# Patient Record
Sex: FEMALE | Race: BLACK OR AFRICAN AMERICAN | NOT HISPANIC OR LATINO | Employment: UNEMPLOYED | URBAN - METROPOLITAN AREA
[De-identification: names, ages, dates, MRNs, and addresses within clinical notes are randomized per-mention and may not be internally consistent; named-entity substitution may affect disease eponyms.]

---

## 2017-01-26 ENCOUNTER — HISTORICAL (OUTPATIENT)
Dept: INTERNAL MEDICINE | Facility: CLINIC | Age: 62
End: 2017-01-26

## 2017-08-10 ENCOUNTER — HISTORICAL (OUTPATIENT)
Dept: INTERNAL MEDICINE | Facility: CLINIC | Age: 62
End: 2017-08-10

## 2017-08-10 LAB
ABS NEUT (OLG): 4.74 X10(3)/MCL (ref 2.1–9.2)
ALBUMIN SERPL-MCNC: 3.6 GM/DL (ref 3.4–5)
ALBUMIN/GLOB SERPL: 1 RATIO (ref 1–2)
ALP SERPL-CCNC: 150 UNIT/L (ref 45–117)
ALT SERPL-CCNC: 15 UNIT/L (ref 12–78)
AST SERPL-CCNC: 13 UNIT/L (ref 15–37)
BASOPHILS # BLD AUTO: 0.04 X10(3)/MCL
BASOPHILS NFR BLD AUTO: 0 % (ref 0–1)
BILIRUB SERPL-MCNC: 0.5 MG/DL (ref 0.2–1)
BILIRUBIN DIRECT+TOT PNL SERPL-MCNC: 0.1 MG/DL
BILIRUBIN DIRECT+TOT PNL SERPL-MCNC: 0.4 MG/DL
BUN SERPL-MCNC: 13 MG/DL (ref 7–18)
CALCIUM SERPL-MCNC: 9.5 MG/DL (ref 8.5–10.1)
CHLORIDE SERPL-SCNC: 110 MMOL/L (ref 98–107)
CHOLEST SERPL-MCNC: 166 MG/DL
CHOLEST/HDLC SERPL: 2.2 {RATIO} (ref 0–4.4)
CO2 SERPL-SCNC: 30 MMOL/L (ref 21–32)
CREAT SERPL-MCNC: 1 MG/DL (ref 0.6–1.3)
EOSINOPHIL # BLD AUTO: 0.25 10*3/UL
EOSINOPHIL NFR BLD AUTO: 3 % (ref 0–5)
ERYTHROCYTE [DISTWIDTH] IN BLOOD BY AUTOMATED COUNT: 13.1 % (ref 11.5–14.5)
EST. AVERAGE GLUCOSE BLD GHB EST-MCNC: 126 MG/DL
GLOBULIN SER-MCNC: 3.9 GM/ML (ref 2.3–3.5)
GLUCOSE SERPL-MCNC: 94 MG/DL (ref 74–106)
HBA1C MFR BLD: 6 % (ref 4.2–6.3)
HCT VFR BLD AUTO: 36.5 % (ref 35–46)
HDLC SERPL-MCNC: 77 MG/DL
HGB BLD-MCNC: 11.4 GM/DL (ref 12–16)
IMM GRANULOCYTES # BLD AUTO: 0.01 10*3/UL
IMM GRANULOCYTES NFR BLD AUTO: 0 %
LDLC SERPL CALC-MCNC: 78 MG/DL (ref 0–130)
LYMPHOCYTES # BLD AUTO: 2.15 X10(3)/MCL
LYMPHOCYTES NFR BLD AUTO: 28 % (ref 15–40)
MCH RBC QN AUTO: 28.6 PG (ref 26–34)
MCHC RBC AUTO-ENTMCNC: 31.2 GM/DL (ref 31–37)
MCV RBC AUTO: 91.7 FL (ref 80–100)
MONOCYTES # BLD AUTO: 0.42 X10(3)/MCL
MONOCYTES NFR BLD AUTO: 6 % (ref 4–12)
NEUTROPHILS # BLD AUTO: 4.74 X10(3)/MCL
NEUTROPHILS NFR BLD AUTO: 62 X10(3)/MCL
PLATELET # BLD AUTO: 257 X10(3)/MCL (ref 130–400)
PMV BLD AUTO: 10.4 FL (ref 7.4–10.4)
POTASSIUM SERPL-SCNC: 3.7 MMOL/L (ref 3.5–5.1)
PROT SERPL-MCNC: 7.5 GM/DL (ref 6.4–8.2)
RBC # BLD AUTO: 3.98 X10(6)/MCL (ref 4–5.2)
SODIUM SERPL-SCNC: 143 MMOL/L (ref 136–145)
TRIGL SERPL-MCNC: 56 MG/DL
TSH SERPL-ACNC: 2.36 MIU/L (ref 0.36–3.74)
VLDLC SERPL CALC-MCNC: 11 MG/DL
WBC # SPEC AUTO: 7.6 X10(3)/MCL (ref 4.5–11)

## 2017-09-15 ENCOUNTER — HISTORICAL (OUTPATIENT)
Dept: RADIOLOGY | Facility: HOSPITAL | Age: 62
End: 2017-09-15

## 2018-02-05 ENCOUNTER — HISTORICAL (OUTPATIENT)
Dept: INTERNAL MEDICINE | Facility: CLINIC | Age: 63
End: 2018-02-05

## 2018-02-05 LAB
ABS NEUT (OLG): 3.96 X10(3)/MCL (ref 2.1–9.2)
ALBUMIN SERPL-MCNC: 3.4 GM/DL (ref 3.4–5)
ALBUMIN/GLOB SERPL: 1 RATIO (ref 1–2)
ALP SERPL-CCNC: 155 UNIT/L (ref 45–117)
ALT SERPL-CCNC: 12 UNIT/L (ref 12–78)
AST SERPL-CCNC: 14 UNIT/L (ref 15–37)
BASOPHILS # BLD AUTO: 0.04 X10(3)/MCL
BASOPHILS NFR BLD AUTO: 1 % (ref 0–1)
BILIRUB SERPL-MCNC: 0.5 MG/DL (ref 0.2–1)
BILIRUBIN DIRECT+TOT PNL SERPL-MCNC: 0.2 MG/DL
BILIRUBIN DIRECT+TOT PNL SERPL-MCNC: 0.3 MG/DL
BUN SERPL-MCNC: 14 MG/DL (ref 7–18)
CALCIUM SERPL-MCNC: 9.4 MG/DL (ref 8.5–10.1)
CHLORIDE SERPL-SCNC: 109 MMOL/L (ref 98–107)
CHOLEST SERPL-MCNC: 156 MG/DL
CHOLEST/HDLC SERPL: 2.4 {RATIO} (ref 0–4.4)
CO2 SERPL-SCNC: 28 MMOL/L (ref 21–32)
CREAT SERPL-MCNC: 1 MG/DL (ref 0.6–1.3)
EOSINOPHIL # BLD AUTO: 0.23 10*3/UL
EOSINOPHIL NFR BLD AUTO: 4 % (ref 0–5)
ERYTHROCYTE [DISTWIDTH] IN BLOOD BY AUTOMATED COUNT: 12.9 % (ref 11.5–14.5)
EST. AVERAGE GLUCOSE BLD GHB EST-MCNC: 126 MG/DL
GLOBULIN SER-MCNC: 4.3 GM/ML (ref 2.3–3.5)
GLUCOSE SERPL-MCNC: 84 MG/DL (ref 74–106)
HBA1C MFR BLD: 6 % (ref 4.2–6.3)
HCT VFR BLD AUTO: 38 % (ref 35–46)
HDLC SERPL-MCNC: 66 MG/DL
HGB BLD-MCNC: 12.1 GM/DL (ref 12–16)
IMM GRANULOCYTES # BLD AUTO: 0.01 10*3/UL
IMM GRANULOCYTES NFR BLD AUTO: 0 %
LDLC SERPL CALC-MCNC: 77 MG/DL (ref 0–130)
LYMPHOCYTES # BLD AUTO: 2.06 X10(3)/MCL
LYMPHOCYTES NFR BLD AUTO: 31 % (ref 15–40)
MCH RBC QN AUTO: 29.4 PG (ref 26–34)
MCHC RBC AUTO-ENTMCNC: 31.8 GM/DL (ref 31–37)
MCV RBC AUTO: 92.2 FL (ref 80–100)
MONOCYTES # BLD AUTO: 0.36 X10(3)/MCL
MONOCYTES NFR BLD AUTO: 5 % (ref 4–12)
NEUTROPHILS # BLD AUTO: 3.96 X10(3)/MCL
NEUTROPHILS NFR BLD AUTO: 59 X10(3)/MCL
PLATELET # BLD AUTO: 275 X10(3)/MCL (ref 130–400)
PMV BLD AUTO: 10.7 FL (ref 7.4–10.4)
POTASSIUM SERPL-SCNC: 3.8 MMOL/L (ref 3.5–5.1)
PROT SERPL-MCNC: 7.7 GM/DL (ref 6.4–8.2)
RBC # BLD AUTO: 4.12 X10(6)/MCL (ref 4–5.2)
SODIUM SERPL-SCNC: 142 MMOL/L (ref 136–145)
TRIGL SERPL-MCNC: 64 MG/DL
TSH SERPL-ACNC: 1.45 MIU/L (ref 0.36–3.74)
VLDLC SERPL CALC-MCNC: 13 MG/DL
WBC # SPEC AUTO: 6.7 X10(3)/MCL (ref 4.5–11)

## 2018-02-19 ENCOUNTER — HISTORICAL (OUTPATIENT)
Dept: ADMINISTRATIVE | Facility: HOSPITAL | Age: 63
End: 2018-02-19

## 2018-08-14 ENCOUNTER — HISTORICAL (OUTPATIENT)
Dept: INTERNAL MEDICINE | Facility: CLINIC | Age: 63
End: 2018-08-14

## 2018-08-14 LAB
ABS NEUT (OLG): 4.52 X10(3)/MCL (ref 2.1–9.2)
ALBUMIN SERPL-MCNC: 3.7 GM/DL (ref 3.4–5)
ALBUMIN/GLOB SERPL: 1 RATIO (ref 1–2)
ALP SERPL-CCNC: 160 UNIT/L (ref 45–117)
ALT SERPL-CCNC: 14 UNIT/L (ref 12–78)
AST SERPL-CCNC: 13 UNIT/L (ref 15–37)
BASOPHILS # BLD AUTO: 0.03 X10(3)/MCL
BASOPHILS NFR BLD AUTO: 0 %
BILIRUB SERPL-MCNC: 0.5 MG/DL (ref 0.2–1)
BILIRUBIN DIRECT+TOT PNL SERPL-MCNC: 0.1 MG/DL
BILIRUBIN DIRECT+TOT PNL SERPL-MCNC: 0.4 MG/DL
BUN SERPL-MCNC: 16 MG/DL (ref 7–18)
CALCIUM SERPL-MCNC: 9.6 MG/DL (ref 8.5–10.1)
CHLORIDE SERPL-SCNC: 108 MMOL/L (ref 98–107)
CHOLEST SERPL-MCNC: 166 MG/DL
CHOLEST/HDLC SERPL: 2.6 {RATIO} (ref 0–4.4)
CO2 SERPL-SCNC: 27 MMOL/L (ref 21–32)
CREAT SERPL-MCNC: 1.1 MG/DL (ref 0.6–1.3)
EOSINOPHIL # BLD AUTO: 0.25 10*3/UL
EOSINOPHIL NFR BLD AUTO: 3 %
ERYTHROCYTE [DISTWIDTH] IN BLOOD BY AUTOMATED COUNT: 12.9 % (ref 11.5–14.5)
EST. AVERAGE GLUCOSE BLD GHB EST-MCNC: 114 MG/DL
GLOBULIN SER-MCNC: 4.4 GM/ML (ref 2.3–3.5)
GLUCOSE SERPL-MCNC: 92 MG/DL (ref 74–106)
HBA1C MFR BLD: 5.6 % (ref 4.2–6.3)
HCT VFR BLD AUTO: 38.6 % (ref 35–46)
HDLC SERPL-MCNC: 64 MG/DL
HGB BLD-MCNC: 12.4 GM/DL (ref 12–16)
IMM GRANULOCYTES # BLD AUTO: 0.02 10*3/UL
IMM GRANULOCYTES NFR BLD AUTO: 0 %
LDLC SERPL CALC-MCNC: 85 MG/DL (ref 0–130)
LYMPHOCYTES # BLD AUTO: 2.44 X10(3)/MCL
LYMPHOCYTES NFR BLD AUTO: 32 % (ref 13–40)
MCH RBC QN AUTO: 29.6 PG (ref 26–34)
MCHC RBC AUTO-ENTMCNC: 32.1 GM/DL (ref 31–37)
MCV RBC AUTO: 92.1 FL (ref 80–100)
MONOCYTES # BLD AUTO: 0.4 X10(3)/MCL
MONOCYTES NFR BLD AUTO: 5 % (ref 4–12)
NEUTROPHILS # BLD AUTO: 4.52 X10(3)/MCL
NEUTROPHILS NFR BLD AUTO: 59 X10(3)/MCL
PLATELET # BLD AUTO: 278 X10(3)/MCL (ref 130–400)
PMV BLD AUTO: 10.4 FL (ref 7.4–10.4)
POTASSIUM SERPL-SCNC: 3.9 MMOL/L (ref 3.5–5.1)
PROT SERPL-MCNC: 8.1 GM/DL (ref 6.4–8.2)
RBC # BLD AUTO: 4.19 X10(6)/MCL (ref 4–5.2)
SODIUM SERPL-SCNC: 142 MMOL/L (ref 136–145)
TRIGL SERPL-MCNC: 84 MG/DL
TSH SERPL-ACNC: 1.01 MIU/L (ref 0.36–3.74)
VLDLC SERPL CALC-MCNC: 17 MG/DL
WBC # SPEC AUTO: 7.7 X10(3)/MCL (ref 4.5–11)

## 2019-03-15 ENCOUNTER — HISTORICAL (OUTPATIENT)
Dept: INTERNAL MEDICINE | Facility: CLINIC | Age: 64
End: 2019-03-15

## 2019-03-15 LAB
ABS NEUT (OLG): 4.82 X10(3)/MCL (ref 2.1–9.2)
ALBUMIN SERPL-MCNC: 4 GM/DL (ref 3.4–5)
ALBUMIN/GLOB SERPL: 0.9 RATIO (ref 1.1–2)
ALP SERPL-CCNC: 144 UNIT/L (ref 45–117)
ALT SERPL-CCNC: 19 UNIT/L (ref 12–78)
AST SERPL-CCNC: 15 UNIT/L (ref 15–37)
BASOPHILS # BLD AUTO: 0.05 X10(3)/MCL
BASOPHILS NFR BLD AUTO: 1 %
BILIRUB SERPL-MCNC: 0.5 MG/DL (ref 0.2–1)
BILIRUBIN DIRECT+TOT PNL SERPL-MCNC: 0.2 MG/DL
BILIRUBIN DIRECT+TOT PNL SERPL-MCNC: 0.3 MG/DL
BUN SERPL-MCNC: 24 MG/DL (ref 7–18)
CALCIUM SERPL-MCNC: 10.4 MG/DL (ref 8.5–10.1)
CHLORIDE SERPL-SCNC: 106 MMOL/L (ref 98–107)
CHOLEST SERPL-MCNC: 207 MG/DL
CHOLEST/HDLC SERPL: 2.7 {RATIO} (ref 0–4.4)
CO2 SERPL-SCNC: 28 MMOL/L (ref 21–32)
CREAT SERPL-MCNC: 1.3 MG/DL (ref 0.6–1.3)
EOSINOPHIL # BLD AUTO: 0.26 X10(3)/MCL
EOSINOPHIL NFR BLD AUTO: 3 %
ERYTHROCYTE [DISTWIDTH] IN BLOOD BY AUTOMATED COUNT: 12.8 % (ref 11.5–14.5)
EST. AVERAGE GLUCOSE BLD GHB EST-MCNC: 126 MG/DL
GLOBULIN SER-MCNC: 4.5 GM/ML (ref 2.3–3.5)
GLUCOSE SERPL-MCNC: 102 MG/DL (ref 74–106)
HBA1C MFR BLD: 6 % (ref 4.2–6.3)
HCT VFR BLD AUTO: 42 % (ref 35–46)
HDLC SERPL-MCNC: 77 MG/DL
HGB BLD-MCNC: 13.2 GM/DL (ref 12–16)
IMM GRANULOCYTES # BLD AUTO: 0.02 10*3/UL
IMM GRANULOCYTES NFR BLD AUTO: 0 %
LDLC SERPL CALC-MCNC: 115 MG/DL (ref 0–130)
LYMPHOCYTES # BLD AUTO: 2.24 X10(3)/MCL
LYMPHOCYTES NFR BLD AUTO: 28 % (ref 13–40)
MCH RBC QN AUTO: 29.9 PG (ref 26–34)
MCHC RBC AUTO-ENTMCNC: 31.4 GM/DL (ref 31–37)
MCV RBC AUTO: 95 FL (ref 80–100)
MONOCYTES # BLD AUTO: 0.51 X10(3)/MCL
MONOCYTES NFR BLD AUTO: 6 % (ref 4–12)
NEUTROPHILS # BLD AUTO: 4.82 X10(3)/MCL
NEUTROPHILS NFR BLD AUTO: 61 X10(3)/MCL
PLATELET # BLD AUTO: 285 X10(3)/MCL (ref 130–400)
PMV BLD AUTO: 10.5 FL (ref 7.4–10.4)
POTASSIUM SERPL-SCNC: 4.4 MMOL/L (ref 3.5–5.1)
PROT SERPL-MCNC: 8.5 GM/DL (ref 6.4–8.2)
RBC # BLD AUTO: 4.42 X10(6)/MCL (ref 4–5.2)
SODIUM SERPL-SCNC: 139 MMOL/L (ref 136–145)
TRIGL SERPL-MCNC: 75 MG/DL
TSH SERPL-ACNC: 1.75 MIU/L (ref 0.36–3.74)
VLDLC SERPL CALC-MCNC: 15 MG/DL
WBC # SPEC AUTO: 7.9 X10(3)/MCL (ref 4.5–11)

## 2019-03-27 ENCOUNTER — HISTORICAL (OUTPATIENT)
Dept: INTERNAL MEDICINE | Facility: CLINIC | Age: 64
End: 2019-03-27

## 2019-04-22 ENCOUNTER — HISTORICAL (OUTPATIENT)
Dept: RADIOLOGY | Facility: HOSPITAL | Age: 64
End: 2019-04-22

## 2019-09-23 ENCOUNTER — HISTORICAL (OUTPATIENT)
Dept: LAB | Facility: HOSPITAL | Age: 64
End: 2019-09-23

## 2019-09-23 LAB
ABS NEUT (OLG): 4.53 X10(3)/MCL (ref 2.1–9.2)
ALBUMIN SERPL-MCNC: 3.6 GM/DL (ref 3.4–5)
ALBUMIN/GLOB SERPL: 0.8 RATIO (ref 1.1–2)
ALP SERPL-CCNC: 143 UNIT/L (ref 45–117)
ALT SERPL-CCNC: 18 UNIT/L (ref 12–78)
AST SERPL-CCNC: 16 UNIT/L (ref 15–37)
BASOPHILS # BLD AUTO: 0 X10(3)/MCL (ref 0–0.2)
BASOPHILS NFR BLD AUTO: 0 %
BILIRUB SERPL-MCNC: 0.5 MG/DL (ref 0.2–1)
BILIRUBIN DIRECT+TOT PNL SERPL-MCNC: 0.2 MG/DL (ref 0–0.2)
BILIRUBIN DIRECT+TOT PNL SERPL-MCNC: 0.3 MG/DL
BUN SERPL-MCNC: 19 MG/DL (ref 7–18)
CALCIUM SERPL-MCNC: 10 MG/DL (ref 8.5–10.1)
CHLORIDE SERPL-SCNC: 109 MMOL/L (ref 98–107)
CHOLEST SERPL-MCNC: 183 MG/DL
CHOLEST/HDLC SERPL: 2.7 {RATIO} (ref 0–4.4)
CO2 SERPL-SCNC: 27 MMOL/L (ref 21–32)
CREAT SERPL-MCNC: 1.2 MG/DL (ref 0.6–1.3)
EOSINOPHIL # BLD AUTO: 0.2 X10(3)/MCL (ref 0–0.9)
EOSINOPHIL NFR BLD AUTO: 3 %
ERYTHROCYTE [DISTWIDTH] IN BLOOD BY AUTOMATED COUNT: 12.4 % (ref 11.5–14.5)
EST. AVERAGE GLUCOSE BLD GHB EST-MCNC: 117 MG/DL
GLOBULIN SER-MCNC: 4.5 GM/ML (ref 2.3–3.5)
GLUCOSE SERPL-MCNC: 86 MG/DL (ref 74–106)
HBA1C MFR BLD: 5.7 % (ref 4.2–6.3)
HCT VFR BLD AUTO: 38.6 % (ref 35–46)
HDLC SERPL-MCNC: 68 MG/DL (ref 40–59)
HGB BLD-MCNC: 12.2 GM/DL (ref 12–16)
IMM GRANULOCYTES # BLD AUTO: 0.02 10*3/UL
IMM GRANULOCYTES NFR BLD AUTO: 0 %
LDLC SERPL CALC-MCNC: 103 MG/DL
LYMPHOCYTES # BLD AUTO: 2.4 X10(3)/MCL (ref 0.6–4.6)
LYMPHOCYTES NFR BLD AUTO: 31 %
MCH RBC QN AUTO: 29.5 PG (ref 26–34)
MCHC RBC AUTO-ENTMCNC: 31.6 GM/DL (ref 31–37)
MCV RBC AUTO: 93.5 FL (ref 80–100)
MONOCYTES # BLD AUTO: 0.4 X10(3)/MCL (ref 0.1–1.3)
MONOCYTES NFR BLD AUTO: 6 %
NEUTROPHILS # BLD AUTO: 4.53 X10(3)/MCL (ref 2.1–9.2)
NEUTROPHILS NFR BLD AUTO: 59 %
PLATELET # BLD AUTO: 291 X10(3)/MCL (ref 130–400)
PMV BLD AUTO: 10.1 FL (ref 7.4–10.4)
POTASSIUM SERPL-SCNC: 4.2 MMOL/L (ref 3.5–5.1)
PROT SERPL-MCNC: 8.1 GM/DL (ref 6.4–8.2)
RBC # BLD AUTO: 4.13 X10(6)/MCL (ref 4–5.2)
SODIUM SERPL-SCNC: 139 MMOL/L (ref 136–145)
TRIGL SERPL-MCNC: 61 MG/DL
TSH SERPL-ACNC: 1.52 MIU/L (ref 0.36–3.74)
VLDLC SERPL CALC-MCNC: 12 MG/DL
WBC # SPEC AUTO: 7.6 X10(3)/MCL (ref 4.5–11)

## 2019-10-02 ENCOUNTER — HISTORICAL (OUTPATIENT)
Dept: INTERNAL MEDICINE | Facility: CLINIC | Age: 64
End: 2019-10-02

## 2020-03-17 ENCOUNTER — HISTORICAL (OUTPATIENT)
Dept: INTERNAL MEDICINE | Facility: CLINIC | Age: 65
End: 2020-03-17

## 2020-03-17 LAB
ABS NEUT (OLG): 5.74 X10(3)/MCL (ref 2.1–9.2)
ALBUMIN SERPL-MCNC: 3.6 GM/DL (ref 3.4–5)
ALBUMIN/GLOB SERPL: 0.8 RATIO (ref 1.1–2)
ALP SERPL-CCNC: 145 UNIT/L (ref 45–117)
ALT SERPL-CCNC: 19 UNIT/L (ref 12–78)
AST SERPL-CCNC: 13 UNIT/L (ref 15–37)
BASOPHILS # BLD AUTO: 0 X10(3)/MCL (ref 0–0.2)
BASOPHILS NFR BLD AUTO: 1 %
BILIRUB SERPL-MCNC: 0.4 MG/DL (ref 0.2–1)
BILIRUBIN DIRECT+TOT PNL SERPL-MCNC: 0.2 MG/DL
BILIRUBIN DIRECT+TOT PNL SERPL-MCNC: 0.2 MG/DL (ref 0–0.2)
BUN SERPL-MCNC: 22 MG/DL (ref 7–18)
CALCIUM SERPL-MCNC: 9.9 MG/DL (ref 8.5–10.1)
CHLORIDE SERPL-SCNC: 108 MMOL/L (ref 98–107)
CHOLEST SERPL-MCNC: 187 MG/DL
CHOLEST/HDLC SERPL: 2.7 {RATIO} (ref 0–4.4)
CO2 SERPL-SCNC: 28 MMOL/L (ref 21–32)
CREAT SERPL-MCNC: 1.1 MG/DL (ref 0.6–1.3)
EOSINOPHIL # BLD AUTO: 0.2 X10(3)/MCL (ref 0–0.9)
EOSINOPHIL NFR BLD AUTO: 2 %
ERYTHROCYTE [DISTWIDTH] IN BLOOD BY AUTOMATED COUNT: 12.7 % (ref 11.5–14.5)
EST. AVERAGE GLUCOSE BLD GHB EST-MCNC: 131 MG/DL
GLOBULIN SER-MCNC: 4.3 GM/ML (ref 2.3–3.5)
GLUCOSE SERPL-MCNC: 92 MG/DL (ref 74–106)
HBA1C MFR BLD: 6.2 % (ref 4.2–6.3)
HCT VFR BLD AUTO: 39 % (ref 35–46)
HDLC SERPL-MCNC: 70 MG/DL (ref 40–59)
HGB BLD-MCNC: 12 GM/DL (ref 12–16)
IMM GRANULOCYTES # BLD AUTO: 0.02 10*3/UL
IMM GRANULOCYTES NFR BLD AUTO: 0 %
LDLC SERPL CALC-MCNC: 102 MG/DL
LYMPHOCYTES # BLD AUTO: 2.1 X10(3)/MCL (ref 0.6–4.6)
LYMPHOCYTES NFR BLD AUTO: 24 %
MCH RBC QN AUTO: 29.3 PG (ref 26–34)
MCHC RBC AUTO-ENTMCNC: 30.8 GM/DL (ref 31–37)
MCV RBC AUTO: 95.1 FL (ref 80–100)
MONOCYTES # BLD AUTO: 0.5 X10(3)/MCL (ref 0.1–1.3)
MONOCYTES NFR BLD AUTO: 6 %
NEUTROPHILS # BLD AUTO: 5.74 X10(3)/MCL (ref 2.1–9.2)
NEUTROPHILS NFR BLD AUTO: 67 %
PLATELET # BLD AUTO: 306 X10(3)/MCL (ref 130–400)
PMV BLD AUTO: 9.9 FL (ref 7.4–10.4)
POTASSIUM SERPL-SCNC: 4.2 MMOL/L (ref 3.5–5.1)
PROT SERPL-MCNC: 7.9 GM/DL (ref 6.4–8.2)
RBC # BLD AUTO: 4.1 X10(6)/MCL (ref 4–5.2)
SODIUM SERPL-SCNC: 140 MMOL/L (ref 136–145)
TRIGL SERPL-MCNC: 76 MG/DL
TSH SERPL-ACNC: 1.42 MIU/L (ref 0.36–3.74)
VLDLC SERPL CALC-MCNC: 15 MG/DL
WBC # SPEC AUTO: 8.6 X10(3)/MCL (ref 4.5–11)

## 2020-06-16 ENCOUNTER — HISTORICAL (OUTPATIENT)
Dept: RADIOLOGY | Facility: HOSPITAL | Age: 65
End: 2020-06-16

## 2020-12-09 ENCOUNTER — HISTORICAL (OUTPATIENT)
Dept: INTERNAL MEDICINE | Facility: CLINIC | Age: 65
End: 2020-12-09

## 2020-12-09 LAB
ABS NEUT (OLG): 4.88 X10(3)/MCL (ref 2.1–9.2)
ALBUMIN SERPL-MCNC: 4 GM/DL (ref 3.4–4.8)
ALBUMIN/GLOB SERPL: 1 RATIO (ref 1.1–2)
ALP SERPL-CCNC: 144 UNIT/L (ref 40–150)
ALT SERPL-CCNC: 12 UNIT/L (ref 0–55)
AST SERPL-CCNC: 17 UNIT/L (ref 5–34)
BASOPHILS # BLD AUTO: 0 X10(3)/MCL (ref 0–0.2)
BASOPHILS NFR BLD AUTO: 1 %
BILIRUB SERPL-MCNC: 0.6 MG/DL
BILIRUBIN DIRECT+TOT PNL SERPL-MCNC: 0.2 MG/DL (ref 0–0.5)
BILIRUBIN DIRECT+TOT PNL SERPL-MCNC: 0.4 MG/DL (ref 0–0.8)
BUN SERPL-MCNC: 17 MG/DL (ref 9.8–20.1)
CALCIUM SERPL-MCNC: 10.4 MG/DL (ref 8.4–10.2)
CHLORIDE SERPL-SCNC: 108 MMOL/L (ref 98–107)
CHOLEST SERPL-MCNC: 188 MG/DL
CHOLEST/HDLC SERPL: 3 {RATIO} (ref 0–5)
CO2 SERPL-SCNC: 26 MMOL/L (ref 23–31)
CREAT SERPL-MCNC: 1.1 MG/DL (ref 0.55–1.02)
EOSINOPHIL # BLD AUTO: 0.2 X10(3)/MCL (ref 0–0.9)
EOSINOPHIL NFR BLD AUTO: 3 %
ERYTHROCYTE [DISTWIDTH] IN BLOOD BY AUTOMATED COUNT: 13.1 % (ref 11.5–14.5)
EST. AVERAGE GLUCOSE BLD GHB EST-MCNC: 108.3 MG/DL
GLOBULIN SER-MCNC: 4.1 GM/DL (ref 2.4–3.5)
GLUCOSE SERPL-MCNC: 99 MG/DL (ref 82–115)
HBA1C MFR BLD: 5.4 %
HCT VFR BLD AUTO: 40.1 % (ref 35–46)
HDLC SERPL-MCNC: 63 MG/DL (ref 35–60)
HGB BLD-MCNC: 12.4 GM/DL (ref 12–16)
IMM GRANULOCYTES # BLD AUTO: 0.02 10*3/UL
IMM GRANULOCYTES NFR BLD AUTO: 0 %
LDLC SERPL CALC-MCNC: 110 MG/DL (ref 50–140)
LYMPHOCYTES # BLD AUTO: 2 X10(3)/MCL (ref 0.6–4.6)
LYMPHOCYTES NFR BLD AUTO: 26 %
MCH RBC QN AUTO: 29.4 PG (ref 26–34)
MCHC RBC AUTO-ENTMCNC: 30.9 GM/DL (ref 31–37)
MCV RBC AUTO: 95 FL (ref 80–100)
MONOCYTES # BLD AUTO: 0.5 X10(3)/MCL (ref 0.1–1.3)
MONOCYTES NFR BLD AUTO: 6 %
NEUTROPHILS # BLD AUTO: 4.88 X10(3)/MCL (ref 2.1–9.2)
NEUTROPHILS NFR BLD AUTO: 63 %
PLATELET # BLD AUTO: 315 X10(3)/MCL (ref 130–400)
PMV BLD AUTO: 10.3 FL (ref 7.4–10.4)
POTASSIUM SERPL-SCNC: 4.5 MMOL/L (ref 3.5–5.1)
PROT SERPL-MCNC: 8.1 GM/DL (ref 5.8–7.6)
RBC # BLD AUTO: 4.22 X10(6)/MCL (ref 4–5.2)
SODIUM SERPL-SCNC: 142 MMOL/L (ref 136–145)
T4 FREE SERPL-MCNC: 1.04 NG/DL (ref 0.7–1.48)
TRIGL SERPL-MCNC: 75 MG/DL (ref 37–140)
TSH SERPL-ACNC: 1.81 UIU/ML (ref 0.35–4.94)
VLDLC SERPL CALC-MCNC: 15 MG/DL
WBC # SPEC AUTO: 7.7 X10(3)/MCL (ref 4.5–11)

## 2021-07-07 ENCOUNTER — HISTORICAL (OUTPATIENT)
Dept: INTERNAL MEDICINE | Facility: CLINIC | Age: 66
End: 2021-07-07

## 2021-07-07 LAB
ABS NEUT (OLG): 5.82 X10(3)/MCL (ref 2.1–9.2)
ALBUMIN SERPL-MCNC: 3.6 GM/DL (ref 3.4–4.8)
ALBUMIN/GLOB SERPL: 0.8 RATIO (ref 1.1–2)
ALP SERPL-CCNC: 158 UNIT/L (ref 40–150)
ALT SERPL-CCNC: 11 UNIT/L (ref 0–55)
AST SERPL-CCNC: 14 UNIT/L (ref 5–34)
BASOPHILS # BLD AUTO: 0 X10(3)/MCL (ref 0–0.2)
BASOPHILS NFR BLD AUTO: 1 %
BILIRUB SERPL-MCNC: 0.5 MG/DL
BILIRUBIN DIRECT+TOT PNL SERPL-MCNC: 0.2 MG/DL (ref 0–0.5)
BILIRUBIN DIRECT+TOT PNL SERPL-MCNC: 0.3 MG/DL (ref 0–0.8)
BUN SERPL-MCNC: 16 MG/DL (ref 9.8–20.1)
CALCIUM SERPL-MCNC: 10.6 MG/DL (ref 8.4–10.2)
CHLORIDE SERPL-SCNC: 107 MMOL/L (ref 98–107)
CHOLEST SERPL-MCNC: 170 MG/DL
CHOLEST/HDLC SERPL: 3 {RATIO} (ref 0–5)
CO2 SERPL-SCNC: 26 MMOL/L (ref 23–31)
CREAT SERPL-MCNC: 1.12 MG/DL (ref 0.55–1.02)
EOSINOPHIL # BLD AUTO: 0.3 X10(3)/MCL (ref 0–0.9)
EOSINOPHIL NFR BLD AUTO: 3 %
ERYTHROCYTE [DISTWIDTH] IN BLOOD BY AUTOMATED COUNT: 13 % (ref 11.5–14.5)
EST. AVERAGE GLUCOSE BLD GHB EST-MCNC: 99.7 MG/DL
GLOBULIN SER-MCNC: 4.3 GM/DL (ref 2.4–3.5)
GLUCOSE SERPL-MCNC: 93 MG/DL (ref 82–115)
HBA1C MFR BLD: 5.1 %
HCT VFR BLD AUTO: 40 % (ref 35–46)
HDLC SERPL-MCNC: 55 MG/DL (ref 35–60)
HGB BLD-MCNC: 12.4 GM/DL (ref 12–16)
IMM GRANULOCYTES # BLD AUTO: 0.04 10*3/UL
IMM GRANULOCYTES NFR BLD AUTO: 0 %
LDLC SERPL CALC-MCNC: 102 MG/DL (ref 50–140)
LYMPHOCYTES # BLD AUTO: 1.9 X10(3)/MCL (ref 0.6–4.6)
LYMPHOCYTES NFR BLD AUTO: 22 %
MCH RBC QN AUTO: 29.5 PG (ref 26–34)
MCHC RBC AUTO-ENTMCNC: 31 GM/DL (ref 31–37)
MCV RBC AUTO: 95 FL (ref 80–100)
MONOCYTES # BLD AUTO: 0.5 X10(3)/MCL (ref 0.1–1.3)
MONOCYTES NFR BLD AUTO: 6 %
NEUTROPHILS # BLD AUTO: 5.82 X10(3)/MCL (ref 2.1–9.2)
NEUTROPHILS NFR BLD AUTO: 68 %
NRBC BLD AUTO-RTO: 0 % (ref 0–0.2)
PLATELET # BLD AUTO: 327 X10(3)/MCL (ref 130–400)
PMV BLD AUTO: 10.2 FL (ref 7.4–10.4)
POTASSIUM SERPL-SCNC: 4.2 MMOL/L (ref 3.5–5.1)
PROT SERPL-MCNC: 7.9 GM/DL (ref 5.8–7.6)
RBC # BLD AUTO: 4.21 X10(6)/MCL (ref 4–5.2)
SODIUM SERPL-SCNC: 141 MMOL/L (ref 136–145)
T4 FREE SERPL-MCNC: 0.96 NG/DL (ref 0.7–1.48)
TRIGL SERPL-MCNC: 64 MG/DL (ref 37–140)
TSH SERPL-ACNC: 1.58 UIU/ML (ref 0.35–4.94)
VLDLC SERPL CALC-MCNC: 13 MG/DL
WBC # SPEC AUTO: 8.6 X10(3)/MCL (ref 4.5–11)

## 2021-07-21 ENCOUNTER — HISTORICAL (OUTPATIENT)
Dept: RADIOLOGY | Facility: HOSPITAL | Age: 66
End: 2021-07-21

## 2021-10-02 ENCOUNTER — HISTORICAL (OUTPATIENT)
Dept: ADMINISTRATIVE | Facility: HOSPITAL | Age: 66
End: 2021-10-02

## 2021-10-02 LAB
BILIRUB SERPL-MCNC: NEGATIVE MG/DL
BLOOD URINE, POC: NEGATIVE
CLARITY, POC UA: NORMAL
COLOR, POC UA: NORMAL
GLUCOSE UR QL STRIP: NEGATIVE
KETONES UR QL STRIP: NEGATIVE
LEUKOCYTE EST, POC UA: NEGATIVE
NITRITE, POC UA: NEGATIVE
PH, POC UA: 5
PROTEIN, POC: NORMAL
SPECIFIC GRAVITY, POC UA: 1.02
UROBILINOGEN, POC UA: NORMAL

## 2021-10-04 LAB — FINAL CULTURE: NORMAL

## 2022-02-09 ENCOUNTER — HISTORICAL (OUTPATIENT)
Dept: ADMINISTRATIVE | Facility: HOSPITAL | Age: 67
End: 2022-02-09

## 2022-02-09 LAB
ABS NEUT (OLG): 5.86 (ref 2.1–9.2)
ALBUMIN SERPL-MCNC: 3.6 G/DL (ref 3.4–4.8)
ALBUMIN/GLOB SERPL: 1 {RATIO} (ref 1.1–2)
ALP SERPL-CCNC: 147 U/L (ref 40–150)
ALT SERPL-CCNC: 14 U/L (ref 0–55)
AST SERPL-CCNC: 16 U/L (ref 5–34)
BASOPHILS # BLD AUTO: 0.1 10*3/UL (ref 0–0.2)
BASOPHILS NFR BLD AUTO: 1 %
BILIRUB SERPL-MCNC: 0.5 MG/DL
BILIRUBIN DIRECT+TOT PNL SERPL-MCNC: 0.2 (ref 0–0.5)
BILIRUBIN DIRECT+TOT PNL SERPL-MCNC: 0.3 (ref 0–0.8)
BUN SERPL-MCNC: 12.8 MG/DL (ref 9.8–20.1)
CALCIUM SERPL-MCNC: 10.2 MG/DL (ref 8.7–10.5)
CHLORIDE SERPL-SCNC: 107 MMOL/L (ref 98–107)
CHOLEST SERPL-MCNC: 179 MG/DL
CHOLEST/HDLC SERPL: 3 {RATIO} (ref 0–5)
CO2 SERPL-SCNC: 26 MMOL/L (ref 23–31)
CREAT SERPL-MCNC: 1.05 MG/DL (ref 0.55–1.02)
DEPRECATED CALCIDIOL+CALCIFEROL SERPL-MC: 9 NG/ML (ref 30–80)
EOSINOPHIL # BLD AUTO: 0.2 10*3/UL (ref 0–0.9)
EOSINOPHIL NFR BLD AUTO: 2 %
ERYTHROCYTE [DISTWIDTH] IN BLOOD BY AUTOMATED COUNT: 13.6 % (ref 11.5–17)
EST. AVERAGE GLUCOSE BLD GHB EST-MCNC: 102.5 MG/DL
GLOBULIN SER-MCNC: 3.5 G/DL (ref 2.4–3.5)
GLUCOSE SERPL-MCNC: 85 MG/DL (ref 82–115)
HBA1C MFR BLD: 5.2 %
HCT VFR BLD AUTO: 41.8 % (ref 37–47)
HDLC SERPL-MCNC: 59 MG/DL (ref 35–60)
HEMOLYSIS INTERF INDEX SERPL-ACNC: <0
HGB BLD-MCNC: 12.4 G/DL (ref 12–16)
ICTERIC INTERF INDEX SERPL-ACNC: 1
LDLC SERPL CALC-MCNC: 106 MG/DL (ref 50–140)
LIPEMIC INTERF INDEX SERPL-ACNC: <0
LYMPHOCYTES # BLD AUTO: 1.6 10*3/UL (ref 0.6–4.6)
LYMPHOCYTES NFR BLD AUTO: 20 %
MANUAL DIFF? (OHS): NO
MCH RBC QN AUTO: 28.8 PG (ref 27–31)
MCHC RBC AUTO-ENTMCNC: 29.7 G/DL (ref 33–36)
MCV RBC AUTO: 97.2 FL (ref 80–94)
MONOCYTES # BLD AUTO: 0.5 10*3/UL (ref 0.1–1.3)
MONOCYTES NFR BLD AUTO: 6 %
NEUTROPHILS # BLD AUTO: 5.86 10*3/UL (ref 2.1–9.2)
NEUTROPHILS NFR BLD AUTO: 71 %
PLATELET # BLD AUTO: 361 10*3/UL (ref 130–400)
PMV BLD AUTO: 10.1 FL (ref 9.4–12.4)
POS ERR1 (OHS): NORMAL
POTASSIUM SERPL-SCNC: 4.3 MMOL/L (ref 3.5–5.1)
PROT SERPL-MCNC: 7.1 G/DL (ref 5.8–7.6)
RBC # BLD AUTO: 4.3 10*6/UL (ref 4.2–5.4)
SODIUM SERPL-SCNC: 142 MMOL/L (ref 136–145)
TRIGL SERPL-MCNC: 69 MG/DL (ref 37–140)
TSH SERPL-ACNC: 1.89 M[IU]/L (ref 0.35–4.94)
VLDLC SERPL CALC-MCNC: 14 MG/DL
WBC # SPEC AUTO: 8.2 10*3/UL (ref 4.5–11.5)

## 2022-03-31 LAB — CRC RECOMMENDATION EXT: NORMAL

## 2022-05-04 NOTE — HISTORICAL [facility] CERNER
This is a historical note converted from Ruchi. Formatting and pictures may have been removed.  Please reference Ruchi for original formatting and attached multimedia. Chief Complaint  Follow up. Lab results.  History of Present Illness  64 yo bf with htn, glaucoma, cri  Review of Systems  neg cv, neg pulm, neg gi  Physical Exam  Vitals & Measurements  T:?36.6? ?C (Oral)? HR:?80(Peripheral)? RR:?18? BP:?122/82?  HT:?175?cm? WT:?123.3?kg? BMI:?40.26?  aax4 nad  milton eomi  cv rrr s1s2 no mgr  lungs cta no cr or whz  abd nt soft  ext no edema  neuro intact  Assessment/Plan  1.?Hypertension  ? controlled  Ordered:  CBC w/ Auto Diff  Clinic Follow up  Comprehensive Metabolic Panel  Hemoglobin A1C [facility]  Lipid Panel  Office/Outpatient Visit Level 4 Established 14834 PC  Thyroid Stimulating Hormone  ?  2.?Knee pain  ? no nsaids  Ordered:  CBC w/ Auto Diff  Clinic Follow up  Comprehensive Metabolic Panel  Hemoglobin A1C [facility]  Lipid Panel  Office/Outpatient Visit Level 4 Established 31151 PC  Thyroid Stimulating Hormone  ?  3.?Morbid obesity  ? dash  Ordered:  CBC w/ Auto Diff  Clinic Follow up  Comprehensive Metabolic Panel  Hemoglobin A1C [facility]  Lipid Panel  Office/Outpatient Visit Level 4 Established 35962 PC  Thyroid Stimulating Hormone  ?  4.?Renal insufficiency  ? improved  Ordered:  CBC w/ Auto Diff  Clinic Follow up  Comprehensive Metabolic Panel  Hemoglobin A1C [facility]  Lipid Panel  Office/Outpatient Visit Level 4 Established 92575 PC  Thyroid Stimulating Hormone  ?   Problem List/Past Medical History  Ongoing  Hypertension  Knee pain  Morbid obesity  Pre-diabetes  Rash  Renal insufficiency  Historical  Depression  Procedure/Surgical History  Appendectomy  Bilateral tubal ligation  Cholecystectomy  YAIMA BSO - Total abdominal hysterectomy and bilateral salpingo-oophorectomy  Tonsillectomy   Medications  amlodipine 10 mg oral tablet, 10 mg= 1 tab(s), Oral, Daily, 3 refills  LATANOPROST .005 % SOLN, OPTH, qPM,? ?Not  taking  spironolactone 50 mg oral tablet, 50 mg= 1 tab(s), Oral, BID, 3 refills  Timolol 0.5% Eye Drops, OPTH, qAM  triamcinolone 0.5% topical cream, See Instructions, 11 refills,? ?Not taking  Zofran ODT 4 mg oral tablet, disintegrating, 4 mg= 1 tab(s), Oral, TID, PRN,? 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 Exercise type: Walking., 03/27/2019  Home/Environment  Lives with Children. Living situation: Home/Independent. Alcohol abuse in household: No. Substance abuse in household: No. Smoker in household: No. Injuries/Abuse/Neglect in household: No. Feels unsafe at home: No. Family/Friends available for support: Yes. Concern for family members at home: No. Major illness in household: No. Financial concerns: No. TV/Computer concerns: No., 05/12/2015  Nutrition/Health  renal, Sleeping concerns: No. Feels highly stressed: No., 03/27/2019  Sexual  Gender Identity Identifies as female., 03/27/2019  Substance Abuse - No Risk, 05/12/2015  Never, 08/20/2018  Never, 08/11/2016  Tobacco - No Risk, 05/12/2015  Never (less than 100 in lifetime), N/A, 03/27/2019  Family History  Hypertension.: Mother.  Immunizations  Vaccine Date Status   influenza virus vaccine, inactivated 02/19/2018 Given   pneumococcal vacc 10/04/2010 Recorded   Health Maintenance  Health Maintenance  ???Pending?(in the next year)  ??? ??OverDue  ??? ? ? ?Diabetes Screening due??and every?  ??? ??Due?  ??? ? ? ?Alcohol Misuse Screening due??03/27/19??and every 1??year(s)  ??? ? ? ?Aspirin Therapy for CVD Prevention due??03/27/19??and every 1??year(s)  ??? ? ? ?Tetanus Vaccine due??03/27/19??and every 10??year(s)  ??? ? ? ?Zoster Vaccine due??03/27/19??and every 100??year(s)  ??? ??Due In Future?  ??? ? ? ?Colorectal Screening not due until??08/14/19??and every  1??year(s)  ??? ? ? ?Breast Cancer Screening not due until??09/15/19??and every 2??year(s)  ??? ? ? ?Hypertension Management-BMP not due until??03/14/20??and every 1??year(s)  ??? ? ? ?Blood Pressure Screening not due until??03/26/20??and every 1??year(s)  ??? ? ? ?Body Mass Index Check not due until??03/26/20??and every 1??year(s)  ??? ? ? ?Hypertension Management-Blood Pressure not due until??03/26/20??and every 1??year(s)  ???Satisfied?(in the past 1 year)  ??? ??Satisfied?  ??? ? ? ?ADL Screening on??03/27/19.??Satisfied by Haydee Abdul LPN  ??? ? ? ?Blood Pressure Screening on??03/27/19.??Satisfied by Haydee Abdul LPN  ??? ? ? ?Body Mass Index Check on??03/27/19.??Satisfied by Haydee Abdul LPN  ??? ? ? ?Colorectal Screening on??08/14/18.??Satisfied by Hilda Gutiérrez  ??? ? ? ?Depression Screening on??08/20/18.??Satisfied by Susan Reid LPN  ??? ? ? ?Diabetes Screening on??03/15/19.??Satisfied by Honorio Valencia Jr.  ??? ? ? ?Hypertension Management-Blood Pressure on??03/27/19.??Satisfied by Haydee Abdul LPN  ??? ? ? ?Lipid Screening on??03/15/19.??Satisfied by Honorio Valencia Jr.  ??? ? ? ?Obesity Screening on??03/27/19.??Satisfied by Haydee Abdul LPN  ??? ? ? ?Smoking Cessation (Coronary Artery Disease) on??09/20/18.??Satisfied by Cleveland Ndiaye RN  ?  ?

## 2022-05-19 ENCOUNTER — OFFICE VISIT (OUTPATIENT)
Dept: INTERNAL MEDICINE | Facility: CLINIC | Age: 67
End: 2022-05-19
Payer: MEDICARE

## 2022-05-19 DIAGNOSIS — F43.21 GRIEF AT LOSS OF CHILD: ICD-10-CM

## 2022-05-19 DIAGNOSIS — Z63.4 GRIEF AT LOSS OF CHILD: ICD-10-CM

## 2022-05-19 DIAGNOSIS — F33.40 RECURRENT MAJOR DEPRESSIVE DISORDER, IN REMISSION: Primary | ICD-10-CM

## 2022-05-19 PROCEDURE — 1160F PR REVIEW ALL MEDS BY PRESCRIBER/CLIN PHARMACIST DOCUMENTED: ICD-10-PCS | Mod: CPTII,95,, | Performed by: NURSE PRACTITIONER

## 2022-05-19 PROCEDURE — 1159F MED LIST DOCD IN RCRD: CPT | Mod: CPTII,95,, | Performed by: NURSE PRACTITIONER

## 2022-05-19 PROCEDURE — 1160F RVW MEDS BY RX/DR IN RCRD: CPT | Mod: CPTII,95,, | Performed by: NURSE PRACTITIONER

## 2022-05-19 PROCEDURE — 1159F PR MEDICATION LIST DOCUMENTED IN MEDICAL RECORD: ICD-10-PCS | Mod: CPTII,95,, | Performed by: NURSE PRACTITIONER

## 2022-05-19 PROCEDURE — 99441 PR PHYSICIAN TELEPHONE EVALUATION 5-10 MIN: ICD-10-PCS | Mod: 95,,, | Performed by: NURSE PRACTITIONER

## 2022-05-19 PROCEDURE — 99441 PR PHYSICIAN TELEPHONE EVALUATION 5-10 MIN: CPT | Mod: 95,,, | Performed by: NURSE PRACTITIONER

## 2022-05-19 RX ORDER — FLUOXETINE HYDROCHLORIDE 40 MG/1
40 CAPSULE ORAL DAILY
Qty: 90 CAPSULE | Refills: 3 | Status: SHIPPED | OUTPATIENT
Start: 2022-05-19 | End: 2022-08-19 | Stop reason: SDUPTHER

## 2022-05-19 SDOH — SOCIAL DETERMINANTS OF HEALTH (SDOH): DISSAPEARANCE AND DEATH OF FAMILY MEMBER: Z63.4
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
## 2022-07-21 ENCOUNTER — OFFICE VISIT (OUTPATIENT)
Dept: FAMILY MEDICINE | Facility: CLINIC | Age: 67
End: 2022-07-21
Payer: MEDICARE

## 2022-07-21 VITALS
HEIGHT: 69 IN | TEMPERATURE: 98 F | DIASTOLIC BLOOD PRESSURE: 71 MMHG | RESPIRATION RATE: 18 BRPM | HEART RATE: 73 BPM | SYSTOLIC BLOOD PRESSURE: 118 MMHG | WEIGHT: 249.81 LBS | BODY MASS INDEX: 37 KG/M2

## 2022-07-21 DIAGNOSIS — M17.0 OSTEOARTHRITIS OF BOTH KNEES, UNSPECIFIED OSTEOARTHRITIS TYPE: ICD-10-CM

## 2022-07-21 DIAGNOSIS — R73.03 PREDIABETES: ICD-10-CM

## 2022-07-21 DIAGNOSIS — N18.9 CHRONIC KIDNEY DISEASE, UNSPECIFIED CKD STAGE: ICD-10-CM

## 2022-07-21 DIAGNOSIS — M25.569 KNEE PAIN, UNSPECIFIED CHRONICITY, UNSPECIFIED LATERALITY: ICD-10-CM

## 2022-07-21 DIAGNOSIS — F32.A DEPRESSION, UNSPECIFIED DEPRESSION TYPE: ICD-10-CM

## 2022-07-21 DIAGNOSIS — L30.9 DERMATITIS: Primary | ICD-10-CM

## 2022-07-21 PROCEDURE — 3008F BODY MASS INDEX DOCD: CPT | Mod: CPTII,,, | Performed by: FAMILY MEDICINE

## 2022-07-21 PROCEDURE — 3288F FALL RISK ASSESSMENT DOCD: CPT | Mod: CPTII,,, | Performed by: FAMILY MEDICINE

## 2022-07-21 PROCEDURE — 3074F SYST BP LT 130 MM HG: CPT | Mod: CPTII,,, | Performed by: FAMILY MEDICINE

## 2022-07-21 PROCEDURE — 1101F PT FALLS ASSESS-DOCD LE1/YR: CPT | Mod: CPTII,,, | Performed by: FAMILY MEDICINE

## 2022-07-21 PROCEDURE — 3078F PR MOST RECENT DIASTOLIC BLOOD PRESSURE < 80 MM HG: ICD-10-PCS | Mod: CPTII,,, | Performed by: FAMILY MEDICINE

## 2022-07-21 PROCEDURE — 3078F DIAST BP <80 MM HG: CPT | Mod: CPTII,,, | Performed by: FAMILY MEDICINE

## 2022-07-21 PROCEDURE — 99214 OFFICE O/P EST MOD 30 MIN: CPT | Mod: S$PBB,,, | Performed by: FAMILY MEDICINE

## 2022-07-21 PROCEDURE — 1101F PR PT FALLS ASSESS DOC 0-1 FALLS W/OUT INJ PAST YR: ICD-10-PCS | Mod: CPTII,,, | Performed by: FAMILY MEDICINE

## 2022-07-21 PROCEDURE — 3074F PR MOST RECENT SYSTOLIC BLOOD PRESSURE < 130 MM HG: ICD-10-PCS | Mod: CPTII,,, | Performed by: FAMILY MEDICINE

## 2022-07-21 PROCEDURE — 3008F PR BODY MASS INDEX (BMI) DOCUMENTED: ICD-10-PCS | Mod: CPTII,,, | Performed by: FAMILY MEDICINE

## 2022-07-21 PROCEDURE — 1159F MED LIST DOCD IN RCRD: CPT | Mod: CPTII,,, | Performed by: FAMILY MEDICINE

## 2022-07-21 PROCEDURE — 3288F PR FALLS RISK ASSESSMENT DOCUMENTED: ICD-10-PCS | Mod: CPTII,,, | Performed by: FAMILY MEDICINE

## 2022-07-21 PROCEDURE — 99214 PR OFFICE/OUTPT VISIT, EST, LEVL IV, 30-39 MIN: ICD-10-PCS | Mod: S$PBB,,, | Performed by: FAMILY MEDICINE

## 2022-07-21 PROCEDURE — 1159F PR MEDICATION LIST DOCUMENTED IN MEDICAL RECORD: ICD-10-PCS | Mod: CPTII,,, | Performed by: FAMILY MEDICINE

## 2022-07-21 PROCEDURE — 99214 OFFICE O/P EST MOD 30 MIN: CPT | Mod: PBBFAC | Performed by: FAMILY MEDICINE

## 2022-07-21 RX ORDER — CLOTRIMAZOLE 1 %
CREAM (GRAM) TOPICAL 2 TIMES DAILY
Qty: 15 G | Refills: 0 | Status: SHIPPED | OUTPATIENT
Start: 2022-07-21 | End: 2022-11-22

## 2022-07-21 RX ORDER — HYDROCORTISONE 1 %
CREAM (GRAM) TOPICAL 2 TIMES DAILY
Qty: 15 G | Refills: 0 | Status: SHIPPED | OUTPATIENT
Start: 2022-07-21 | End: 2022-11-22

## 2022-07-21 NOTE — PROGRESS NOTES
Maira Terry  07/21/2022  [id number]      Chief Complaint:  Chief Complaint   Patient presents with    Follow-up       History of Present Illness:    66 yo F PMH HTN, obesity, CKD, prediabetes, Depression that presents for follow up  Patient reporting rash underarm; has happened before. Was told it was fungus. Happens when she uses the wrong deodorant  Had a fall a month ago; knee gave out. Has bilateral knee pain  Csope performed 2022  Feels better from depression; considering stopping medication      History:  History reviewed. No pertinent past medical history.  Past Surgical History:   Procedure Laterality Date    CHOLECYSTECTOMY      HYSTERECTOMY      TONSILLECTOMY       Family History   Problem Relation Age of Onset    Hypertension Mother     Heart attack Mother      Social History     Socioeconomic History    Marital status: Single   Tobacco Use    Smoking status: Never Smoker    Smokeless tobacco: Never Used   Substance and Sexual Activity    Alcohol use: Never    Drug use: Never     There is no problem list on file for this patient.    Review of patient's allergies indicates:   Allergen Reactions    Hydrocodone-acetaminophen Nausea And Vomiting         Medications:  Current Outpatient Medications on File Prior to Visit   Medication Sig Dispense Refill    amLODIPine (NORVASC) 10 MG tablet Take 10 mg by mouth once daily.      FLUoxetine 40 MG capsule Take 1 capsule (40 mg total) by mouth once daily. 90 capsule 3    spironolactone (ALDACTONE) 50 MG tablet Take 50 mg by mouth 2 (two) times daily.      ergocalciferol (ERGOCALCIFEROL) 50,000 unit Cap Take 50,000 Units by mouth every 7 days.       No current facility-administered medications on file prior to visit.       Medications have been reviewed and reconciled with patient at this visit.    Adverse reactions to current medications reviewed with patient..      Exam:  Wt Readings from Last 3 Encounters:   07/21/22 113.3 kg (249 lb 12.5 oz)      Temp Readings from Last 3 Encounters:   07/21/22 98.1 °F (36.7 °C)     BP Readings from Last 3 Encounters:   07/21/22 118/71     Pulse Readings from Last 3 Encounters:   07/21/22 73     Body mass index is 36.89 kg/m².      ROS:  See HPI for details      All Other ROS: Negative except as stated in HPI.    PE:  General: Alert and oriented, No acute distress.  Head: Normocephalic, Atraumatic.  Eye: Pupils are equal, round and reactive to light, Extraocular movements are intact, Sclera non-icteric.  Ears/Nose/Throat: Normal, Mucosa moist,Clear.  Neck/Thyroid: Supple, Non-tender, No carotid bruit, No palpable thyromegaly or thyroid nodule,  Respiratory: Clear to auscultation bilaterally; No wheezes, rales or rhonchi,Non-labored respirations, Symmetrical chest wall expansion.  Cardiovascular: Regular rate and rhythm, S1/S2 normal, No murmurs, rubs or gallops.  Gastrointestinal: Soft, Non-tender, Non-distended, Normal bowel sounds, No palpable organomegaly.  Musculoskeletal: Normal range of motion.  Integumentary: Warm, Dry, Intact, No suspicious lesions or rashes.  Extremities: No clubbing, cyanosis or edema, bilateral knee crepitus  Neurologic: No focal deficits,  Motor strength normal upper and lower extremities, Sensory exam intact.  Psychiatric: Normal interaction, Coherent speech, Euthymic mood, Appropriate affect    Laboratory Reviewed ({Yes)  Lab Results   Component Value Date    WBC 8.2 02/09/2022    HGB 12.4 02/09/2022    HCT 41.8 02/09/2022     02/09/2022    CHOL 179 02/09/2022    TRIG 69 02/09/2022    HDL 59 02/09/2022    ALT 14 02/09/2022    AST 16 02/09/2022     02/09/2022    K 4.3 02/09/2022    CREATININE 1.05 02/09/2022    BUN 12.8 02/09/2022    CO2 26 02/09/2022    TSH 1.8934 02/09/2022    HGBA1C 5.2 02/09/2022       Maira was seen today for follow-up.    Diagnoses and all orders for this visit:    Dermatitis    Depression, unspecified depression type    Knee pain, unspecified chronicity,  unspecified laterality    Chronic kidney disease, unspecified CKD stage    Prediabetes                Care Plan/Goals: Reviewed    Goals    None         Follow up: Follow up in about 6 months (around 1/21/2023).    Maira Terry  07/21/2022  [id number]              Visit Type:a scheduled routine follow-up visit    Chief Complaint:  Chief Complaint   Patient presents with    Follow-up       History of Present Illness:        History:  History reviewed. No pertinent past medical history.  Past Surgical History:   Procedure Laterality Date    CHOLECYSTECTOMY      HYSTERECTOMY      TONSILLECTOMY       Family History   Problem Relation Age of Onset    Hypertension Mother     Heart attack Mother      Social History     Socioeconomic History    Marital status: Single   Tobacco Use    Smoking status: Never Smoker    Smokeless tobacco: Never Used   Substance and Sexual Activity    Alcohol use: Never    Drug use: Never     There is no problem list on file for this patient.    Review of patient's allergies indicates:   Allergen Reactions    Hydrocodone-acetaminophen Nausea And Vomiting         Medications:  Current Outpatient Medications on File Prior to Visit   Medication Sig Dispense Refill    amLODIPine (NORVASC) 10 MG tablet Take 10 mg by mouth once daily.      FLUoxetine 40 MG capsule Take 1 capsule (40 mg total) by mouth once daily. 90 capsule 3    spironolactone (ALDACTONE) 50 MG tablet Take 50 mg by mouth 2 (two) times daily.      ergocalciferol (ERGOCALCIFEROL) 50,000 unit Cap Take 50,000 Units by mouth every 7 days.       No current facility-administered medications on file prior to visit.       Medications have been reviewed and reconciled with patient at this visit.    Adverse reactions to current medications reviewed with patient..      Exam:  Wt Readings from Last 3 Encounters:   07/21/22 113.3 kg (249 lb 12.5 oz)     Temp Readings from Last 3 Encounters:   07/21/22 98.1 °F (36.7 °C)     BP  Readings from Last 3 Encounters:   07/21/22 118/71     Pulse Readings from Last 3 Encounters:   07/21/22 73     Body mass index is 36.89 kg/m².      ROS:  See HPI for details      All Other ROS: Negative except as stated in HPI.    PE:  General: Alert and oriented, No acute distress.  Head: Normocephalic, Atraumatic.  Eye: Pupils are equal, round and reactive to light, Extraocular movements are intact, Sclera non-icteric.  Ears/Nose/Throat: Normal, Mucosa moist,Clear.  Neck/Thyroid: Supple, Non-tender, No carotid bruit, No palpable thyromegaly or thyroid nodule, No lymphadenopathy, No JVD, Full range of motion.  Respiratory: Clear to auscultation bilaterally; No wheezes, rales or rhonchi,Non-labored respirations, Symmetrical chest wall expansion.  Cardiovascular: Regular rate and rhythm, S1/S2 normal, No murmurs, rubs or gallops.  Gastrointestinal: Soft, Non-tender, Non-distended, Normal bowel sounds, No palpable organomegaly.  Musculoskeletal: Normal range of motion.  Integumentary: Warm, Dry, Intact, No suspicious lesions or rashes.  Extremities: No clubbing, cyanosis or edema  Neurologic: No focal deficits, Cranial Nerves II-XII are grossly intact, Motor strength normal upper and lower extremities, Sensory exam intact.  Psychiatric: Normal interaction, Coherent speech, Euthymic mood, Appropriate affect    Laboratory Reviewed ({Yes)  Lab Results   Component Value Date    WBC 8.2 02/09/2022    HGB 12.4 02/09/2022    HCT 41.8 02/09/2022     02/09/2022    CHOL 179 02/09/2022    TRIG 69 02/09/2022    HDL 59 02/09/2022    ALT 14 02/09/2022    AST 16 02/09/2022     02/09/2022    K 4.3 02/09/2022    CREATININE 1.05 02/09/2022    BUN 12.8 02/09/2022    CO2 26 02/09/2022    TSH 1.8934 02/09/2022    HGBA1C 5.2 02/09/2022       Maira was seen today for follow-up.    Diagnoses and all orders for this visit:    Dermatitis    Depression, unspecified depression type    Knee pain, unspecified chronicity, unspecified  laterality    Chronic kidney disease, unspecified CKD stage    Prediabetes    Osteoarthritis of both knees, unspecified osteoarthritis type                Care Plan/Goals: Reviewed    Goals    None         Follow up: Follow up in about 6 months (around 1/21/2023).    Maira Terry  07/21/2022  [id number]              Visit Type:a scheduled routine follow-up visit    Chief Complaint:  Chief Complaint   Patient presents with    Follow-up       History of Present Illness:        History:  History reviewed. No pertinent past medical history.  Past Surgical History:   Procedure Laterality Date    CHOLECYSTECTOMY      HYSTERECTOMY      TONSILLECTOMY       Family History   Problem Relation Age of Onset    Hypertension Mother     Heart attack Mother      Social History     Socioeconomic History    Marital status: Single   Tobacco Use    Smoking status: Never Smoker    Smokeless tobacco: Never Used   Substance and Sexual Activity    Alcohol use: Never    Drug use: Never     There is no problem list on file for this patient.    Review of patient's allergies indicates:   Allergen Reactions    Hydrocodone-acetaminophen Nausea And Vomiting         Medications:  Current Outpatient Medications on File Prior to Visit   Medication Sig Dispense Refill    amLODIPine (NORVASC) 10 MG tablet Take 10 mg by mouth once daily.      FLUoxetine 40 MG capsule Take 1 capsule (40 mg total) by mouth once daily. 90 capsule 3    spironolactone (ALDACTONE) 50 MG tablet Take 50 mg by mouth 2 (two) times daily.      ergocalciferol (ERGOCALCIFEROL) 50,000 unit Cap Take 50,000 Units by mouth every 7 days.       No current facility-administered medications on file prior to visit.       Medications have been reviewed and reconciled with patient at this visit.    Adverse reactions to current medications reviewed with patient..      Exam:  Wt Readings from Last 3 Encounters:   07/21/22 113.3 kg (249 lb 12.5 oz)     Temp Readings from Last 3  Encounters:   07/21/22 98.1 °F (36.7 °C)     BP Readings from Last 3 Encounters:   07/21/22 118/71     Pulse Readings from Last 3 Encounters:   07/21/22 73     Body mass index is 36.89 kg/m².      ROS:  See HPI for details    Constitutional: Denies Change in appetite. Denies Chills. Denies Fever. Denies Night sweats.  Eye: Denies Blurred vision. Denies Discharge. Denies Eye pain.  ENT: Denies Decreased hearing. Denies Sore throat. Denies Swollen glands.  Respiratory: Denies Cough. Denies Shortness of breath. Denies Shortness of breath with exertion. Denies Wheezing.  Cardiovascular: Denies Chest pain at rest. Denies Chest pain with exertion. Denies Irregular heartbeat. Denies Palpitations.  Gastrointestinal: Denies Abdominal pain. Denies Diarrhea. Denies Nausea. Denies Vomiting. Denies Hematemesis or Hematochezia.  Genitourinary: Denies Dysuria. Denies Urinary frequency. Denies Urinary urgency. Denies Blood in urine.  Endocrine: Denies Cold intolerance. Denies Excessive thirst. Denies Heat intolerance. Denies Weight loss. Denies Weight gain.  Musculoskeletal: Denies Painful joints. Denies Weakness.  Integumentary: Denies Rash. Denies Itching. Denies Dry skin.  Neurologic: Denies Dizziness. Denies Fainting. Denies Headache.  Psychiatric: Denies Depression. Denies Anxiety. Denies Suicidal/Homicidal ideations.    All Other ROS: Negative except as stated in HPI.    PE:  General: Alert and oriented, No acute distress.  Head: Normocephalic, Atraumatic.  Eye: Pupils are equal, round and reactive to light, Extraocular movements are intact, Sclera non-icteric.  Ears/Nose/Throat: Normal, Mucosa moist,Clear.  Neck/Thyroid: Supple, Non-tender, No carotid bruit, No palpable thyromegaly or thyroid nodule, No lymphadenopathy, No JVD, Full range of motion.  Respiratory: Clear to auscultation bilaterally; No wheezes, rales or rhonchi,Non-labored respirations, Symmetrical chest wall expansion.  Cardiovascular: Regular rate and  rhythm, S1/S2 normal, No murmurs, rubs or gallops.  Gastrointestinal: Soft, Non-tender, Non-distended, Normal bowel sounds, No palpable organomegaly.  Musculoskeletal: Normal range of motion.  Integumentary: Warm, Dry, Intact, No suspicious lesions or, bilateral axilla with erythema, satellite lesions  Extremities: No clubbing, cyanosis or edema  Neurologic: No focal deficits, Cranial Nerves II-XII are grossly intact, Motor strength normal upper and lower extremities, Sensory exam intact.  Psychiatric: Normal interaction, Coherent speech, Euthymic mood, Appropriate affect    Laboratory Reviewed ({Yes)  Lab Results   Component Value Date    WBC 8.2 02/09/2022    HGB 12.4 02/09/2022    HCT 41.8 02/09/2022     02/09/2022    CHOL 179 02/09/2022    TRIG 69 02/09/2022    HDL 59 02/09/2022    ALT 14 02/09/2022    AST 16 02/09/2022     02/09/2022    K 4.3 02/09/2022    CREATININE 1.05 02/09/2022    BUN 12.8 02/09/2022    CO2 26 02/09/2022    TSH 1.8934 02/09/2022    HGBA1C 5.2 02/09/2022       Maira was seen today for follow-up.    Diagnoses and all orders for this visit:    Dermatitis    Depression, unspecified depression type    Knee pain, unspecified chronicity, unspecified laterality    Chronic kidney disease, unspecified CKD stage    Prediabetes    Osteoarthritis of both knees, unspecified osteoarthritis type      Knee xrays  Referred to PT  Hydrocortisone, clotrimazole for rash  DASH diet, renal diet  Labs before next visit  Mammogram ordered  Csope 2022  S/p hystrectomy          Care Plan/Goals: Reviewed    Goals    None         Follow up: Follow up in about 6 months (around 1/21/2023).

## 2022-07-25 ENCOUNTER — HOSPITAL ENCOUNTER (OUTPATIENT)
Dept: RADIOLOGY | Facility: HOSPITAL | Age: 67
Discharge: HOME | End: 2022-07-25
Attending: FAMILY MEDICINE
Payer: MEDICARE

## 2022-07-25 DIAGNOSIS — Z12.31 BREAST CANCER SCREENING BY MAMMOGRAM: ICD-10-CM

## 2022-07-25 PROCEDURE — 77067 SCR MAMMO BI INCL CAD: CPT | Mod: 26,,, | Performed by: STUDENT IN AN ORGANIZED HEALTH CARE EDUCATION/TRAINING PROGRAM

## 2022-07-25 PROCEDURE — 77063 MAMMO DIGITAL SCREENING BILAT WITH TOMO: ICD-10-PCS | Mod: 26,,, | Performed by: STUDENT IN AN ORGANIZED HEALTH CARE EDUCATION/TRAINING PROGRAM

## 2022-07-25 PROCEDURE — 77067 SCR MAMMO BI INCL CAD: CPT | Mod: TC

## 2022-07-25 PROCEDURE — 77063 BREAST TOMOSYNTHESIS BI: CPT | Mod: 26,,, | Performed by: STUDENT IN AN ORGANIZED HEALTH CARE EDUCATION/TRAINING PROGRAM

## 2022-07-25 PROCEDURE — 77067 MAMMO DIGITAL SCREENING BILAT WITH TOMO: ICD-10-PCS | Mod: 26,,, | Performed by: STUDENT IN AN ORGANIZED HEALTH CARE EDUCATION/TRAINING PROGRAM

## 2022-07-29 RX ORDER — AMLODIPINE BESYLATE 10 MG/1
10 TABLET ORAL DAILY
Qty: 90 TABLET | Refills: 1 | Status: SHIPPED | OUTPATIENT
Start: 2022-07-29 | End: 2023-01-30 | Stop reason: SDUPTHER

## 2022-07-29 NOTE — TELEPHONE ENCOUNTER
----- Message from Juliana Nesbitt sent at 7/29/2022 10:05 AM CDT -----  Regarding: Refill  Provider: Dr. Kemi Bowers  Preferred Pharmacy: [facility]  Last Visit:  Next Visit: 1/23/2023  Patient's Contact Number:    1. Name of Medication: Amlodipine   Dosage: 10 mg tab  Comments:    2. Name of Medication:  Dosage:  Comments:    3. Name of Medication:  Dosage:  Comments    4. Name of Medication:  Dosage:  Comments:    5. Name of Medication:  Dosage:  Comments:

## 2022-08-07 ENCOUNTER — OFFICE VISIT (OUTPATIENT)
Dept: URGENT CARE | Facility: CLINIC | Age: 67
End: 2022-08-07
Payer: MEDICARE

## 2022-08-07 VITALS
DIASTOLIC BLOOD PRESSURE: 72 MMHG | HEART RATE: 73 BPM | BODY MASS INDEX: 36.43 KG/M2 | OXYGEN SATURATION: 98 % | SYSTOLIC BLOOD PRESSURE: 120 MMHG | HEIGHT: 69 IN | RESPIRATION RATE: 18 BRPM | TEMPERATURE: 98 F | WEIGHT: 246 LBS

## 2022-08-07 DIAGNOSIS — U07.1 COVID-19: ICD-10-CM

## 2022-08-07 DIAGNOSIS — R05.9 COUGH: Primary | ICD-10-CM

## 2022-08-07 DIAGNOSIS — U07.1 COVID-19 VIRUS DETECTED: ICD-10-CM

## 2022-08-07 LAB
CTP QC/QA: YES
SARS-COV-2 RDRP RESP QL NAA+PROBE: POSITIVE

## 2022-08-07 PROCEDURE — 3078F PR MOST RECENT DIASTOLIC BLOOD PRESSURE < 80 MM HG: ICD-10-PCS | Mod: CPTII,,, | Performed by: PHYSICIAN ASSISTANT

## 2022-08-07 PROCEDURE — 99213 PR OFFICE/OUTPT VISIT, EST, LEVL III, 20-29 MIN: ICD-10-PCS | Mod: S$PBB,,, | Performed by: PHYSICIAN ASSISTANT

## 2022-08-07 PROCEDURE — 3008F PR BODY MASS INDEX (BMI) DOCUMENTED: ICD-10-PCS | Mod: CPTII,,, | Performed by: PHYSICIAN ASSISTANT

## 2022-08-07 PROCEDURE — 1159F MED LIST DOCD IN RCRD: CPT | Mod: CPTII,,, | Performed by: PHYSICIAN ASSISTANT

## 2022-08-07 PROCEDURE — U0002 COVID-19 LAB TEST NON-CDC: HCPCS | Mod: QW,,, | Performed by: PHYSICIAN ASSISTANT

## 2022-08-07 PROCEDURE — 99213 OFFICE O/P EST LOW 20 MIN: CPT | Mod: S$PBB,,, | Performed by: PHYSICIAN ASSISTANT

## 2022-08-07 PROCEDURE — 3008F BODY MASS INDEX DOCD: CPT | Mod: CPTII,,, | Performed by: PHYSICIAN ASSISTANT

## 2022-08-07 PROCEDURE — 3078F DIAST BP <80 MM HG: CPT | Mod: CPTII,,, | Performed by: PHYSICIAN ASSISTANT

## 2022-08-07 PROCEDURE — U0002: ICD-10-PCS | Mod: QW,,, | Performed by: PHYSICIAN ASSISTANT

## 2022-08-07 PROCEDURE — 1159F PR MEDICATION LIST DOCUMENTED IN MEDICAL RECORD: ICD-10-PCS | Mod: CPTII,,, | Performed by: PHYSICIAN ASSISTANT

## 2022-08-07 PROCEDURE — 3074F SYST BP LT 130 MM HG: CPT | Mod: CPTII,,, | Performed by: PHYSICIAN ASSISTANT

## 2022-08-07 PROCEDURE — 3074F PR MOST RECENT SYSTOLIC BLOOD PRESSURE < 130 MM HG: ICD-10-PCS | Mod: CPTII,,, | Performed by: PHYSICIAN ASSISTANT

## 2022-08-07 RX ORDER — METHYLPREDNISOLONE 4 MG/1
TABLET ORAL
Qty: 1 EACH | Refills: 0 | Status: SHIPPED | OUTPATIENT
Start: 2022-08-07 | End: 2022-11-22

## 2022-08-07 NOTE — PROGRESS NOTES
"Subjective:       Patient ID: Maira Terry is a 67 y.o. female.    Vitals:  height is 5' 9" (1.753 m) and weight is 111.6 kg (246 lb). Her temperature is 98 °F (36.7 °C). Her blood pressure is 120/72 and her pulse is 73. Her respiration is 18 and oxygen saturation is 98%.     Chief Complaint: Sinus Problem    Female at physical therapy for knees 3 days ago now with acute Ssneezing, nasal drip, cough 2 days    Sinus Problem  This is a new problem. The current episode started yesterday. There has been no fever. Associated symptoms include congestion, coughing and sneezing. Pertinent negatives include no chills, ear pain, headaches, neck pain, shortness of breath, sinus pressure or sore throat. Treatments tried: cough drops, cough syrup        Constitution: Negative for chills, fatigue and fever.   HENT: Positive for congestion. Negative for ear pain, sinus pain, sinus pressure, sore throat, trouble swallowing and voice change.    Neck: Negative for neck pain and neck swelling.   Cardiovascular: Negative for chest pain.   Respiratory: Positive for cough. Negative for shortness of breath, stridor and wheezing.    Gastrointestinal: Negative.    Musculoskeletal: Positive for muscle ache. Negative for pain, joint pain and back pain.   Skin: Negative.    Allergic/Immunologic: Negative.  Positive for sneezing.   Neurological: Negative for headaches and altered mental status.   Psychiatric/Behavioral: Negative for altered mental status.       Objective:      Physical Exam   Constitutional: She is oriented to person, place, and time. She appears well-developed. She is cooperative.  Non-toxic appearance. She does not appear ill. No distress.   HENT:   Head: Normocephalic.   Ears:   Right Ear: Hearing, tympanic membrane, external ear and ear canal normal.   Left Ear: Hearing, tympanic membrane, external ear and ear canal normal.   Nose: Congestion present. No mucosal edema, rhinorrhea or nasal deformity. No epistaxis. Right " sinus exhibits no maxillary sinus tenderness and no frontal sinus tenderness. Left sinus exhibits no maxillary sinus tenderness and no frontal sinus tenderness.   Mouth/Throat: Uvula is midline, oropharynx is clear and moist and mucous membranes are normal. Mucous membranes are moist. No trismus in the jaw. Normal dentition. No uvula swelling. No oropharyngeal exudate, posterior oropharyngeal edema or posterior oropharyngeal erythema.   Eyes: Conjunctivae and lids are normal. No scleral icterus.   Neck: Trachea normal and phonation normal. Neck supple. No edema present. No erythema present. No neck rigidity present.   Cardiovascular: Normal rate, regular rhythm, normal heart sounds and normal pulses.   Pulmonary/Chest: Effort normal and breath sounds normal. No stridor. No respiratory distress. She has no decreased breath sounds. She has no wheezes. She has no rhonchi. She has no rales.   Abdominal: Normal appearance.   Musculoskeletal: Normal range of motion.         General: No swelling. Normal range of motion.   Neurological: She is alert and oriented to person, place, and time. She displays no weakness. She exhibits normal muscle tone.   Skin: Skin is warm, dry, intact and not diaphoretic.   Psychiatric: Her speech is normal and behavior is normal. Judgment and thought content normal.   Nursing note and vitals reviewed.             Previous History      Review of patient's allergies indicates:   Allergen Reactions    Hydrocodone-acetaminophen Nausea And Vomiting       Past Medical History:   Diagnosis Date    Depression     Hypertension      Current Outpatient Medications   Medication Instructions    amLODIPine (NORVASC) 10 mg, Oral, Daily    clotrimazole (LOTRIMIN) 1 % cream Topical (Top), 2 times daily, Mix with hydrocortisone    ergocalciferol (ERGOCALCIFEROL) 50,000 Units, Oral, Every 7 days    FLUoxetine 40 mg, Oral, Daily    hydrocortisone 1 % cream Topical (Top), 2 times daily, Mix with  "clotrimazole    methylPREDNISolone (MEDROL DOSEPACK) 4 mg tablet use as directed    spironolactone (ALDACTONE) 50 mg, Oral, 2 times daily     Past Surgical History:   Procedure Laterality Date    CHOLECYSTECTOMY      HYSTERECTOMY      TONSILLECTOMY       Family History   Problem Relation Age of Onset    Hypertension Mother     Heart attack Mother        Social History     Tobacco Use    Smoking status: Never Smoker    Smokeless tobacco: Never Used   Substance Use Topics    Alcohol use: Never    Drug use: Never        Physical Exam      Vital Signs Reviewed   /72   Pulse 73   Temp 98 °F (36.7 °C)   Resp 18   Ht 5' 9" (1.753 m)   Wt 111.6 kg (246 lb)   SpO2 98%   BMI 36.33 kg/m²        Procedures    Procedures     Labs     Results for orders placed or performed in visit on 08/07/22   POCT COVID-19 Rapid Screening   Result Value Ref Range    POC Rapid COVID Positive (A) Negative     Acceptable Yes        Assessment:       1. Cough    2. COVID-19          Plan:       COVID Positive  Start vitamin C 1000mg twice a day, zinc 100mg once a day, and vitamin D3 at least 2000 units a day. Current CDC guidelines recommend that you quarantine for 5 days starting the day after your symptoms began. Quarantine can end after 5 days as long as the last 24 hours of quarantine you are fever free and there is improvement of all your symptoms. Wear a mask around others for 5 additional days after quarantine. Treat your symptoms as you would the common cold. If you live with anybody, isolate yourself in a separate bedroom and use a separate bathroom. If your symptoms worsen or you develop shortness of breath or a fever over 102.5 , seek medical attention immediately.     Cough  -     POCT COVID-19 Rapid Screening    COVID-19  -     methylPREDNISolone (MEDROL DOSEPACK) 4 mg tablet; use as directed  Dispense: 1 each; Refill: 0                   "

## 2022-08-07 NOTE — LETTER
August 7, 2022      [facility] [place]  [address]  [place] [address]  Phone: [phone]       Patient: Maira Terry   YOB: 1955  Date of Visit: 08/07/2022    To Whom It May Concern:    Artur Terry  was at [facility] on 08/07/2022. The patient may return to work/school on 08/13/2022  with no restrictions. If you have any questions or concerns, or if I can be of further assistance, please do not hesitate to contact me.    Sincerely,    GERBER Hernandez

## 2022-08-18 RX ORDER — SPIRONOLACTONE 100 MG/1
TABLET, FILM COATED ORAL
COMMUNITY
End: 2023-02-23 | Stop reason: SDUPTHER

## 2022-08-19 ENCOUNTER — OFFICE VISIT (OUTPATIENT)
Dept: INTERNAL MEDICINE | Facility: CLINIC | Age: 67
End: 2022-08-19
Payer: MEDICARE

## 2022-08-19 DIAGNOSIS — F33.0 MILD EPISODE OF RECURRENT MAJOR DEPRESSIVE DISORDER: Primary | ICD-10-CM

## 2022-08-19 PROCEDURE — 1160F PR REVIEW ALL MEDS BY PRESCRIBER/CLIN PHARMACIST DOCUMENTED: ICD-10-PCS | Mod: CPTII,95,, | Performed by: NURSE PRACTITIONER

## 2022-08-19 PROCEDURE — 1101F PR PT FALLS ASSESS DOC 0-1 FALLS W/OUT INJ PAST YR: ICD-10-PCS | Mod: CPTII,95,, | Performed by: NURSE PRACTITIONER

## 2022-08-19 PROCEDURE — 1160F RVW MEDS BY RX/DR IN RCRD: CPT | Mod: CPTII,95,, | Performed by: NURSE PRACTITIONER

## 2022-08-19 PROCEDURE — 1101F PT FALLS ASSESS-DOCD LE1/YR: CPT | Mod: CPTII,95,, | Performed by: NURSE PRACTITIONER

## 2022-08-19 PROCEDURE — 1159F PR MEDICATION LIST DOCUMENTED IN MEDICAL RECORD: ICD-10-PCS | Mod: CPTII,95,, | Performed by: NURSE PRACTITIONER

## 2022-08-19 PROCEDURE — 3288F PR FALLS RISK ASSESSMENT DOCUMENTED: ICD-10-PCS | Mod: CPTII,95,, | Performed by: NURSE PRACTITIONER

## 2022-08-19 PROCEDURE — 99442 PR PHYSICIAN TELEPHONE EVALUATION 11-20 MIN: ICD-10-PCS | Mod: 95,,, | Performed by: NURSE PRACTITIONER

## 2022-08-19 PROCEDURE — 3288F FALL RISK ASSESSMENT DOCD: CPT | Mod: CPTII,95,, | Performed by: NURSE PRACTITIONER

## 2022-08-19 PROCEDURE — 99442 PR PHYSICIAN TELEPHONE EVALUATION 11-20 MIN: CPT | Mod: 95,,, | Performed by: NURSE PRACTITIONER

## 2022-08-19 PROCEDURE — 1159F MED LIST DOCD IN RCRD: CPT | Mod: CPTII,95,, | Performed by: NURSE PRACTITIONER

## 2022-08-19 RX ORDER — FLUOXETINE HYDROCHLORIDE 40 MG/1
40 CAPSULE ORAL DAILY
Qty: 90 CAPSULE | Refills: 3 | Status: SHIPPED | OUTPATIENT
Start: 2022-08-19 | End: 2023-04-05 | Stop reason: SDUPTHER
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
## 2022-08-30 ENCOUNTER — LAB VISIT (OUTPATIENT)
Dept: LAB | Facility: HOSPITAL | Age: 67
End: 2022-08-30
Attending: INTERNAL MEDICINE
Payer: MEDICARE

## 2022-08-30 DIAGNOSIS — Z80.0 FAMILY HISTORY OF MALIGNANT NEOPLASM OF GASTROINTESTINAL TRACT: ICD-10-CM

## 2022-08-30 DIAGNOSIS — Z86.010 PERSONAL HISTORY OF COLONIC POLYPS: Primary | ICD-10-CM

## 2022-08-30 DIAGNOSIS — I10 ESSENTIAL HYPERTENSION, MALIGNANT: ICD-10-CM

## 2022-08-30 LAB
ALBUMIN SERPL-MCNC: 3.8 GM/DL (ref 3.4–4.8)
ALBUMIN/GLOB SERPL: 1.2 RATIO (ref 1.1–2)
ALP SERPL-CCNC: 131 UNIT/L (ref 40–150)
ALT SERPL-CCNC: 28 UNIT/L (ref 0–55)
AST SERPL-CCNC: 25 UNIT/L (ref 5–34)
BASOPHILS # BLD AUTO: 0.04 X10(3)/MCL (ref 0–0.2)
BASOPHILS NFR BLD AUTO: 0.6 %
BILIRUBIN DIRECT+TOT PNL SERPL-MCNC: 0.6 MG/DL
BUN SERPL-MCNC: 20.3 MG/DL (ref 9.8–20.1)
CALCIUM SERPL-MCNC: 10.3 MG/DL (ref 8.4–10.2)
CHLORIDE SERPL-SCNC: 107 MMOL/L (ref 98–107)
CO2 SERPL-SCNC: 27 MMOL/L (ref 23–31)
CREAT SERPL-MCNC: 1.01 MG/DL (ref 0.55–1.02)
EOSINOPHIL # BLD AUTO: 0.36 X10(3)/MCL (ref 0–0.9)
EOSINOPHIL NFR BLD AUTO: 5.7 %
ERYTHROCYTE [DISTWIDTH] IN BLOOD BY AUTOMATED COUNT: 12.7 % (ref 11.5–17)
GFR SERPLBLD CREATININE-BSD FMLA CKD-EPI: >60 MLS/MIN/1.73/M2
GLOBULIN SER-MCNC: 3.2 GM/DL (ref 2.4–3.5)
GLUCOSE SERPL-MCNC: 87 MG/DL (ref 82–115)
HCT VFR BLD AUTO: 39.7 % (ref 37–47)
HGB BLD-MCNC: 12.1 GM/DL (ref 12–16)
IMM GRANULOCYTES # BLD AUTO: 0.02 X10(3)/MCL (ref 0–0.04)
IMM GRANULOCYTES NFR BLD AUTO: 0.3 %
LYMPHOCYTES # BLD AUTO: 1.61 X10(3)/MCL (ref 0.6–4.6)
LYMPHOCYTES NFR BLD AUTO: 25.6 %
MCH RBC QN AUTO: 29.1 PG (ref 27–31)
MCHC RBC AUTO-ENTMCNC: 30.5 MG/DL (ref 33–36)
MCV RBC AUTO: 95.4 FL (ref 80–94)
MONOCYTES # BLD AUTO: 0.64 X10(3)/MCL (ref 0.1–1.3)
MONOCYTES NFR BLD AUTO: 10.2 %
NEUTROPHILS # BLD AUTO: 3.6 X10(3)/MCL (ref 2.1–9.2)
NEUTROPHILS NFR BLD AUTO: 57.6 %
NRBC BLD AUTO-RTO: 0 %
PLATELET # BLD AUTO: 303 X10(3)/MCL (ref 130–400)
PMV BLD AUTO: 10.2 FL (ref 7.4–10.4)
POTASSIUM SERPL-SCNC: 4.6 MMOL/L (ref 3.5–5.1)
PROT SERPL-MCNC: 7 GM/DL (ref 5.8–7.6)
RBC # BLD AUTO: 4.16 X10(6)/MCL (ref 4.2–5.4)
SODIUM SERPL-SCNC: 138 MMOL/L (ref 136–145)
WBC # SPEC AUTO: 6.3 X10(3)/MCL (ref 4.5–11.5)

## 2022-08-30 PROCEDURE — 80053 COMPREHEN METABOLIC PANEL: CPT

## 2022-08-30 PROCEDURE — 85025 COMPLETE CBC W/AUTO DIFF WBC: CPT

## 2022-08-30 PROCEDURE — 36415 COLL VENOUS BLD VENIPUNCTURE: CPT

## 2022-08-31 ENCOUNTER — LAB VISIT (OUTPATIENT)
Dept: LAB | Facility: HOSPITAL | Age: 67
End: 2022-08-31
Attending: INTERNAL MEDICINE
Payer: MEDICARE

## 2022-08-31 DIAGNOSIS — Z80.0 FAMILY HISTORY OF MALIGNANT NEOPLASM OF GASTROINTESTINAL TRACT: ICD-10-CM

## 2022-08-31 DIAGNOSIS — I10 ESSENTIAL HYPERTENSION, MALIGNANT: ICD-10-CM

## 2022-08-31 DIAGNOSIS — Z86.010 PERSONAL HISTORY OF COLONIC POLYPS: Primary | ICD-10-CM

## 2022-08-31 PROCEDURE — 93010 EKG 12-LEAD: ICD-10-PCS | Mod: ,,, | Performed by: INTERNAL MEDICINE

## 2022-08-31 PROCEDURE — 93010 ELECTROCARDIOGRAM REPORT: CPT | Mod: ,,, | Performed by: INTERNAL MEDICINE

## 2022-08-31 PROCEDURE — 93005 ELECTROCARDIOGRAM TRACING: CPT

## 2022-09-22 ENCOUNTER — HISTORICAL (OUTPATIENT)
Dept: ADMINISTRATIVE | Facility: HOSPITAL | Age: 67
End: 2022-09-22
Payer: MEDICARE

## 2022-10-11 ENCOUNTER — TELEPHONE (OUTPATIENT)
Dept: FAMILY MEDICINE | Facility: CLINIC | Age: 67
End: 2022-10-11
Payer: MEDICARE

## 2022-10-11 DIAGNOSIS — M17.9 OSTEOARTHRITIS OF KNEE, UNSPECIFIED LATERALITY, UNSPECIFIED OSTEOARTHRITIS TYPE: Primary | ICD-10-CM

## 2022-10-11 NOTE — TELEPHONE ENCOUNTER
----- Message from Juliana Nesbitt sent at 10/10/2022  1:27 PM CDT -----  Regarding: Referral  General Phone Message    Caller is:  (x ) Patient  (  ) Family  (  ) Pharmacy    (  ) Home Health  (  ) Other     Provider: Dr. eKmi Bowers    Last Visit:    Next Visit: 1/23/2023    Reason for Call: Patient states she is seeing a Physical Therapist but it is not helping. She is asking what are the next steps. She is asking to be referred to ortho.                   Best Time to Contact:    Preferred Phone Number:

## 2022-10-11 NOTE — TELEPHONE ENCOUNTER
Spoke to patient regarding this information. Informed patient that  put on a referral for her to go to Ortho. Patient verbalized understanding.

## 2022-10-11 NOTE — TELEPHONE ENCOUNTER
----- Message from Marianna Corrigan LPN sent at 10/10/2022  3:38 PM CDT -----  Regarding: FW: Referral    ----- Message -----  From: Juliana Nesbitt  Sent: 10/10/2022   1:28 PM CDT  To: [facility] Clinical Support Staff  Subject: Referral                                         General Phone Message    Caller is:  (x ) Patient  (  ) Family  (  ) Pharmacy    (  ) Home Health  (  ) Other     Provider: Dr. Kemi Bowers    Last Visit:    Next Visit: 1/23/2023    Reason for Call: Patient states she is seeing a Physical Therapist but it is not helping. She is asking what are the next steps. She is asking to be referred to ortho.                   Best Time to Contact:    Preferred Phone Number:

## 2022-11-22 ENCOUNTER — HOSPITAL ENCOUNTER (OUTPATIENT)
Dept: RADIOLOGY | Facility: HOSPITAL | Age: 67
Discharge: HOME | End: 2022-11-22
Attending: STUDENT IN AN ORGANIZED HEALTH CARE EDUCATION/TRAINING PROGRAM
Payer: MEDICARE

## 2022-11-22 ENCOUNTER — OFFICE VISIT (OUTPATIENT)
Dept: ORTHOPEDICS | Facility: CLINIC | Age: 67
End: 2022-11-22
Payer: MEDICARE

## 2022-11-22 VITALS
HEART RATE: 77 BPM | HEIGHT: 69 IN | SYSTOLIC BLOOD PRESSURE: 118 MMHG | DIASTOLIC BLOOD PRESSURE: 78 MMHG | WEIGHT: 236.63 LBS | BODY MASS INDEX: 35.05 KG/M2

## 2022-11-22 DIAGNOSIS — M25.562 PAIN IN BOTH KNEES, UNSPECIFIED CHRONICITY: ICD-10-CM

## 2022-11-22 DIAGNOSIS — M25.561 PAIN IN BOTH KNEES, UNSPECIFIED CHRONICITY: ICD-10-CM

## 2022-11-22 DIAGNOSIS — M17.9 OSTEOARTHRITIS OF KNEE, UNSPECIFIED LATERALITY, UNSPECIFIED OSTEOARTHRITIS TYPE: Primary | ICD-10-CM

## 2022-11-22 DIAGNOSIS — E66.9 CLASS 1 OBESITY WITH BODY MASS INDEX (BMI) OF 34.0 TO 34.9 IN ADULT, UNSPECIFIED OBESITY TYPE, UNSPECIFIED WHETHER SERIOUS COMORBIDITY PRESENT: ICD-10-CM

## 2022-11-22 DIAGNOSIS — N28.9 RENAL INSUFFICIENCY: ICD-10-CM

## 2022-11-22 PROBLEM — F34.1 PERSISTENT DEPRESSIVE DISORDER: Status: ACTIVE | Noted: 2022-11-22

## 2022-11-22 PROBLEM — I10 PRIMARY HYPERTENSION: Status: ACTIVE | Noted: 2022-11-22

## 2022-11-22 PROCEDURE — 73564 X-RAY EXAM KNEE 4 OR MORE: CPT | Mod: TC,LT

## 2022-11-22 PROCEDURE — 99214 OFFICE O/P EST MOD 30 MIN: CPT | Mod: PBBFAC

## 2022-11-22 PROCEDURE — 73564 X-RAY EXAM KNEE 4 OR MORE: CPT | Mod: TC,RT

## 2022-11-22 RX ORDER — DICLOFENAC SODIUM 10 MG/G
2 GEL TOPICAL 4 TIMES DAILY PRN
Qty: 100 G | Refills: 3 | Status: SHIPPED | OUTPATIENT
Start: 2022-11-22 | End: 2023-04-21

## 2022-11-22 NOTE — PROGRESS NOTES
"Subjective:    Patient ID: Maira Terry is a 67 y.o. female  who presented to [facility] for new visit..    Chief Complaint: Pain of the Left Knee and Pain of the Right Knee    History of Present Illness:    Maira Terry is a 67-year-old female who presents today with complaints of bilateral knee pain.  Her pain has been present for the past 6 months but has gotten worse.  The pain is symmetrical to both knees and she is unable to localize the pain to a particular point on either knee.  She says that the pain is constant gets worse with ambulation, raising from a seated position, or using stairs.  Has gone to physical therapy and completed approximately 6 weeks with only minimal relief.    Knee Review of Systems:  Swelling?  no  Instability?  no  Mechanical sx?  no  <30 min AM stiffness? yes  Limited ROM? no  Fever/Chills? no     Objective:      Physical Exam:    /78   Pulse 77   Ht 5' 9" (1.753 m)   Wt 107.3 kg (236 lb 9.6 oz)   BMI 34.94 kg/m²       Appearance:  Normal gait/station  FWB  Alignment: Left: normal Right: normal   Soft tissue swelling: Left: no Right: no  Effusion: Left:  Negative Right: Negative  Erythema: Left no Right: no  Ecchymosis: Left: no Right: no  Atrophy: Left: no Right: no    Palpation:  Knee Tenderness: Left: Medial joint line and Lateral joint line Right: Medial joint line and Lateral joint line    Range of motion:  Flexion (140): Left:  140 Right: 140  Extension (0): Left: 0 Right: 0    Strength:  Extension: Left 5/5  Pain: no     Right 5/5 Pain: no  Flexion: Left 5/5 Pain: yes Right   5/5 Pain: yes        Special Tests:  Ballotable Effusion:Left: Negative Right: Negative   Fluid Wave: Left: Negative Right: Negative   Crepitus: Left: Negative Right: Negative   Patellar grind test: Left: Negative  Right: Negative  Apprehension test: Left: Negative Right: Negative   Varus: @ 0, Left Negative Right: Negative.  @ 30, Left " Negative  Right Negative   Valgus: @ 0, Left Negative Right: Negative.  @ 30, Left Negative  Right Negative  Lachman: Left: Negative Right: Negative   Ant Drawer: Left: Negative Right: Negative   Posterior Drawer: Left: Negative Right: Negative   Boy: Left: Negative Right: Negative       General appearance: NAD  Peripheral pulses: normal bilaterally   Sensation: normal    Labs:  Last A1c: 5.2     Imaging:   Previous images reviewed.  X-rays ordered and performed today: yes  # of views: 4 Laterality: bilateral  My Interpretation:  Bilateral tricompartmental arthritis (lateral greater than medial) with KL grade 4 joint space narrowing, osteophytes, subchondral cysts, and subchondral sclerosis.    Assessment:      Encounter Diagnoses   Name Primary?    Osteoarthritis of knee, unspecified laterality, unspecified osteoarthritis type Yes    Class 1 obesity with body mass index (BMI) of 34.0 to 34.9 in adult, unspecified obesity type, unspecified whether serious comorbidity present     Renal insufficiency      Plan:     Orders Placed This Encounter   Procedures    X-Ray Knee Complete 4 or More Views Left     Standing Status:   Future     Number of Occurrences:   1     Standing Expiration Date:   11/22/2023     Order Specific Question:   May the Radiologist modify the order per protocol to meet the clinical needs of the patient?     Answer:   Yes     Order Specific Question:   Release to patient     Answer:   Immediate    X-Ray Knee Complete 4 Or More Views Right     Standing Status:   Future     Number of Occurrences:   1     Standing Expiration Date:   11/22/2023     Order Specific Question:   May the Radiologist modify the order per protocol to meet the clinical needs of the patient?     Answer:   Yes     Order Specific Question:   Release to patient     Answer:   Immediate        Dx: bilateral Knee Osteoarthritis. Acute in moderate exacerbation.   Treatment Plan: Discussed with patient diagnosis, prognosis, and  treatment recommendations. Education provided.    Home physical therapy exercise handouts provided to patient.   formal PT script provided to patient. You may take this script to whichever physical therapist you would like to go to.   topical hot or cold therapy  Over the counter NSAID and/or tylenol provided you do not have contraindications such as but not limited to liver or kidney disease or uncontrolled blood pressure. If you're doctors have told you to to not take them based on your health, do not take them.   oral glucosamine 1500 mg/day.  topical capsaicin as needed  Using a brace provided to you here or from your local pharmacy or durable medical equipment (DME) store.   Imaging: radiological studies ordered and independently reviewed; discussed with patient; pending radiologist interpretation.   Weight Management: is paramount. maintain healthy weight of a bmi of 24.9 or less..   Procedure: Discussed CSI/VSI as treatment options; discussed CSI vs VS injections as treatment options in future if conservative measures do not improve symptoms.  Activity: Activity as tolerated; HEP to include aerobic conditioning and strength training with non-painful activity. ROM/STG exercises. Proper footware; assistive devises to avoid limping.   Therapy: Physical Therapy  Medication: first line treatment with daily acetaminophen. Up to 1000 mg three times daily can be taken; medication precautions given. and topical NSAIDs prescribed; medication precautions given. Please see your primary care physician for further refills.  RTC: PRN; call if any issues.

## 2023-01-30 RX ORDER — AMLODIPINE BESYLATE 10 MG/1
10 TABLET ORAL DAILY
Qty: 30 TABLET | Refills: 0 | Status: SHIPPED | OUTPATIENT
Start: 2023-01-30 | End: 2023-02-28 | Stop reason: SDUPTHER

## 2023-01-30 NOTE — TELEPHONE ENCOUNTER
----- Message from Juliana Nesbitt sent at 1/30/2023 12:00 PM CST -----  Regarding: Refill  Provider: Dr Nakia Lopez  Preferred Pharmacy: [facility]  Last Visit:  Next Visit:   Patient's Contact Number:    1. Name of Medication: Amlodipine   Dosage: 10 mg tab  Comments:    2. Name of Medication:  Dosage:  Comments:    3. Name of Medication:  Dosage:  Comments    4. Name of Medication:  Dosage:  Comments:    5. Name of Medication:  Dosage:  Comments:

## 2023-02-22 ENCOUNTER — OFFICE VISIT (OUTPATIENT)
Dept: BEHAVIORAL HEALTH | Facility: CLINIC | Age: 68
End: 2023-02-22
Payer: MEDICARE

## 2023-02-22 VITALS
OXYGEN SATURATION: 100 % | HEART RATE: 67 BPM | SYSTOLIC BLOOD PRESSURE: 116 MMHG | DIASTOLIC BLOOD PRESSURE: 64 MMHG | RESPIRATION RATE: 16 BRPM | TEMPERATURE: 98 F | BODY MASS INDEX: 34.75 KG/M2 | WEIGHT: 234.63 LBS | HEIGHT: 69 IN

## 2023-02-22 DIAGNOSIS — F34.1 PERSISTENT DEPRESSIVE DISORDER: Primary | ICD-10-CM

## 2023-02-22 DIAGNOSIS — F43.21 GRIEF: Chronic | ICD-10-CM

## 2023-02-22 PROCEDURE — 3074F PR MOST RECENT SYSTOLIC BLOOD PRESSURE < 130 MM HG: ICD-10-PCS | Mod: CPTII,,, | Performed by: NURSE PRACTITIONER

## 2023-02-22 PROCEDURE — 3008F BODY MASS INDEX DOCD: CPT | Mod: CPTII,,, | Performed by: NURSE PRACTITIONER

## 2023-02-22 PROCEDURE — 99214 OFFICE O/P EST MOD 30 MIN: CPT | Mod: S$PBB,,, | Performed by: NURSE PRACTITIONER

## 2023-02-22 PROCEDURE — 99214 PR OFFICE/OUTPT VISIT, EST, LEVL IV, 30-39 MIN: ICD-10-PCS | Mod: S$PBB,,, | Performed by: NURSE PRACTITIONER

## 2023-02-22 PROCEDURE — 3078F DIAST BP <80 MM HG: CPT | Mod: CPTII,,, | Performed by: NURSE PRACTITIONER

## 2023-02-22 PROCEDURE — 3008F PR BODY MASS INDEX (BMI) DOCUMENTED: ICD-10-PCS | Mod: CPTII,,, | Performed by: NURSE PRACTITIONER

## 2023-02-22 PROCEDURE — 99214 OFFICE O/P EST MOD 30 MIN: CPT | Mod: PBBFAC,PN | Performed by: NURSE PRACTITIONER

## 2023-02-22 PROCEDURE — 3078F PR MOST RECENT DIASTOLIC BLOOD PRESSURE < 80 MM HG: ICD-10-PCS | Mod: CPTII,,, | Performed by: NURSE PRACTITIONER

## 2023-02-22 PROCEDURE — 3074F SYST BP LT 130 MM HG: CPT | Mod: CPTII,,, | Performed by: NURSE PRACTITIONER

## 2023-02-22 PROCEDURE — 1159F MED LIST DOCD IN RCRD: CPT | Mod: CPTII,,, | Performed by: NURSE PRACTITIONER

## 2023-02-22 PROCEDURE — 1159F PR MEDICATION LIST DOCUMENTED IN MEDICAL RECORD: ICD-10-PCS | Mod: CPTII,,, | Performed by: NURSE PRACTITIONER

## 2023-02-22 PROCEDURE — 1160F RVW MEDS BY RX/DR IN RCRD: CPT | Mod: CPTII,,, | Performed by: NURSE PRACTITIONER

## 2023-02-22 PROCEDURE — 1160F PR REVIEW ALL MEDS BY PRESCRIBER/CLIN PHARMACIST DOCUMENTED: ICD-10-PCS | Mod: CPTII,,, | Performed by: NURSE PRACTITIONER

## 2023-02-22 RX ORDER — FLUOXETINE HYDROCHLORIDE 20 MG/1
20 CAPSULE ORAL DAILY
Qty: 30 CAPSULE | Refills: 3 | Status: SHIPPED | OUTPATIENT
Start: 2023-02-22 | End: 2023-04-05 | Stop reason: SDUPTHER
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
## 2023-02-23 NOTE — TELEPHONE ENCOUNTER
----- Message from Juliana Nesbitt sent at 2/23/2023 11:57 AM CST -----  Regarding: Refill  Provider: Dr Nakia Lopez   Preferred Pharmacy: [facility]   Last Visit:   Next Visit: 4/21/2023  Patient's Contact Number:     1. Name of Medication: Spironolactone  Dosage: 50 mg tab   Comments:     2. Name of Medication:   Dosage:   Comments:     3. Name of Medication:   Dosage:   Comments     4. Name of Medication:   Dosage:   Comments:     5. Name of Medication:   Dosage:   Comments:

## 2023-02-24 RX ORDER — SPIRONOLACTONE 50 MG/1
50 TABLET, FILM COATED ORAL 2 TIMES DAILY
Qty: 60 TABLET | Refills: 1 | Status: SHIPPED | OUTPATIENT
Start: 2023-02-24 | End: 2023-04-21 | Stop reason: SDUPTHER

## 2023-02-28 RX ORDER — AMLODIPINE BESYLATE 10 MG/1
10 TABLET ORAL DAILY
Qty: 30 TABLET | Refills: 1 | Status: SHIPPED | OUTPATIENT
Start: 2023-02-28 | End: 2023-04-21 | Stop reason: SDUPTHER

## 2023-02-28 RX ORDER — AMLODIPINE BESYLATE 10 MG/1
10 TABLET ORAL DAILY
COMMUNITY
End: 2023-02-28

## 2023-02-28 NOTE — TELEPHONE ENCOUNTER
----- Message from Juliana Nesbitt sent at 2/28/2023  1:35 PM CST -----  Regarding: Refill-Patient is currently out of medication  Provider: Dr Nakia Lopez   Preferred Pharmacy: [facility]   Last Visit:   Next Visit: 4/21/2023   Patient's Contact Number:     1. Name of Medication: Amlodipine Besylate   Dosage: 10 mg tab   Comments:     2. Name of Medication:   Dosage:   Comments:     3. Name of Medication:   Dosage:   Comments     4. Name of Medication:   Dosage:   Comments:     5. Name of Medication:   Dosage:   Comments:

## 2023-04-05 ENCOUNTER — OFFICE VISIT (OUTPATIENT)
Dept: BEHAVIORAL HEALTH | Facility: CLINIC | Age: 68
End: 2023-04-05
Payer: MEDICARE

## 2023-04-05 VITALS
HEIGHT: 69 IN | HEART RATE: 98 BPM | SYSTOLIC BLOOD PRESSURE: 136 MMHG | WEIGHT: 236.19 LBS | DIASTOLIC BLOOD PRESSURE: 86 MMHG | BODY MASS INDEX: 34.98 KG/M2 | TEMPERATURE: 99 F

## 2023-04-05 DIAGNOSIS — F34.1 PERSISTENT DEPRESSIVE DISORDER: ICD-10-CM

## 2023-04-05 DIAGNOSIS — F43.21 GRIEF: Primary | Chronic | ICD-10-CM

## 2023-04-05 PROCEDURE — 1160F RVW MEDS BY RX/DR IN RCRD: CPT | Mod: CPTII,,, | Performed by: NURSE PRACTITIONER

## 2023-04-05 PROCEDURE — 1101F PR PT FALLS ASSESS DOC 0-1 FALLS W/OUT INJ PAST YR: ICD-10-PCS | Mod: CPTII,,, | Performed by: NURSE PRACTITIONER

## 2023-04-05 PROCEDURE — 1159F PR MEDICATION LIST DOCUMENTED IN MEDICAL RECORD: ICD-10-PCS | Mod: CPTII,,, | Performed by: NURSE PRACTITIONER

## 2023-04-05 PROCEDURE — 99213 OFFICE O/P EST LOW 20 MIN: CPT | Mod: PBBFAC,PN | Performed by: NURSE PRACTITIONER

## 2023-04-05 PROCEDURE — 1160F PR REVIEW ALL MEDS BY PRESCRIBER/CLIN PHARMACIST DOCUMENTED: ICD-10-PCS | Mod: CPTII,,, | Performed by: NURSE PRACTITIONER

## 2023-04-05 PROCEDURE — 3075F SYST BP GE 130 - 139MM HG: CPT | Mod: CPTII,,, | Performed by: NURSE PRACTITIONER

## 2023-04-05 PROCEDURE — 3288F FALL RISK ASSESSMENT DOCD: CPT | Mod: CPTII,,, | Performed by: NURSE PRACTITIONER

## 2023-04-05 PROCEDURE — 3075F PR MOST RECENT SYSTOLIC BLOOD PRESS GE 130-139MM HG: ICD-10-PCS | Mod: CPTII,,, | Performed by: NURSE PRACTITIONER

## 2023-04-05 PROCEDURE — 3008F PR BODY MASS INDEX (BMI) DOCUMENTED: ICD-10-PCS | Mod: CPTII,,, | Performed by: NURSE PRACTITIONER

## 2023-04-05 PROCEDURE — 99214 OFFICE O/P EST MOD 30 MIN: CPT | Mod: S$PBB,,, | Performed by: NURSE PRACTITIONER

## 2023-04-05 PROCEDURE — 1101F PT FALLS ASSESS-DOCD LE1/YR: CPT | Mod: CPTII,,, | Performed by: NURSE PRACTITIONER

## 2023-04-05 PROCEDURE — 1159F MED LIST DOCD IN RCRD: CPT | Mod: CPTII,,, | Performed by: NURSE PRACTITIONER

## 2023-04-05 PROCEDURE — 3079F DIAST BP 80-89 MM HG: CPT | Mod: CPTII,,, | Performed by: NURSE PRACTITIONER

## 2023-04-05 PROCEDURE — 99214 PR OFFICE/OUTPT VISIT, EST, LEVL IV, 30-39 MIN: ICD-10-PCS | Mod: S$PBB,,, | Performed by: NURSE PRACTITIONER

## 2023-04-05 PROCEDURE — 3288F PR FALLS RISK ASSESSMENT DOCUMENTED: ICD-10-PCS | Mod: CPTII,,, | Performed by: NURSE PRACTITIONER

## 2023-04-05 PROCEDURE — 3008F BODY MASS INDEX DOCD: CPT | Mod: CPTII,,, | Performed by: NURSE PRACTITIONER

## 2023-04-05 PROCEDURE — 3079F PR MOST RECENT DIASTOLIC BLOOD PRESSURE 80-89 MM HG: ICD-10-PCS | Mod: CPTII,,, | Performed by: NURSE PRACTITIONER

## 2023-04-05 RX ORDER — FLUOXETINE HYDROCHLORIDE 20 MG/1
20 CAPSULE ORAL DAILY
Qty: 90 CAPSULE | Refills: 1 | Status: SHIPPED | OUTPATIENT
Start: 2023-04-05 | End: 2023-10-05 | Stop reason: SDUPTHER

## 2023-04-05 RX ORDER — FLUOXETINE HYDROCHLORIDE 40 MG/1
40 CAPSULE ORAL DAILY
Qty: 90 CAPSULE | Refills: 1 | Status: SHIPPED | OUTPATIENT
Start: 2023-04-05 | End: 2023-10-05 | Stop reason: SDUPTHER
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
## 2023-04-21 ENCOUNTER — OFFICE VISIT (OUTPATIENT)
Dept: FAMILY MEDICINE | Facility: CLINIC | Age: 68
End: 2023-04-21
Payer: MEDICARE

## 2023-04-21 VITALS
HEIGHT: 69 IN | BODY MASS INDEX: 35.1 KG/M2 | RESPIRATION RATE: 18 BRPM | WEIGHT: 237 LBS | HEART RATE: 86 BPM | DIASTOLIC BLOOD PRESSURE: 79 MMHG | SYSTOLIC BLOOD PRESSURE: 126 MMHG | TEMPERATURE: 97 F | OXYGEN SATURATION: 100 %

## 2023-04-21 DIAGNOSIS — R73.03 PREDIABETES: ICD-10-CM

## 2023-04-21 DIAGNOSIS — N18.9 CHRONIC KIDNEY DISEASE, UNSPECIFIED CKD STAGE: ICD-10-CM

## 2023-04-21 DIAGNOSIS — E83.52 HYPERCALCEMIA: ICD-10-CM

## 2023-04-21 DIAGNOSIS — I10 HYPERTENSION, UNSPECIFIED TYPE: Primary | ICD-10-CM

## 2023-04-21 DIAGNOSIS — Z23 IMMUNIZATION DUE: ICD-10-CM

## 2023-04-21 DIAGNOSIS — M17.0 PRIMARY OSTEOARTHRITIS OF BOTH KNEES: ICD-10-CM

## 2023-04-21 DIAGNOSIS — E78.5 HYPERLIPIDEMIA, UNSPECIFIED HYPERLIPIDEMIA TYPE: ICD-10-CM

## 2023-04-21 DIAGNOSIS — Z11.59 ENCOUNTER FOR HEPATITIS C SCREENING TEST FOR LOW RISK PATIENT: ICD-10-CM

## 2023-04-21 PROCEDURE — 3078F PR MOST RECENT DIASTOLIC BLOOD PRESSURE < 80 MM HG: ICD-10-PCS | Mod: CPTII,,, | Performed by: FAMILY MEDICINE

## 2023-04-21 PROCEDURE — 1160F RVW MEDS BY RX/DR IN RCRD: CPT | Mod: CPTII,,, | Performed by: FAMILY MEDICINE

## 2023-04-21 PROCEDURE — 90662 IIV NO PRSV INCREASED AG IM: CPT | Mod: PBBFAC

## 2023-04-21 PROCEDURE — 1160F PR REVIEW ALL MEDS BY PRESCRIBER/CLIN PHARMACIST DOCUMENTED: ICD-10-PCS | Mod: CPTII,,, | Performed by: FAMILY MEDICINE

## 2023-04-21 PROCEDURE — 3008F PR BODY MASS INDEX (BMI) DOCUMENTED: ICD-10-PCS | Mod: CPTII,,, | Performed by: FAMILY MEDICINE

## 2023-04-21 PROCEDURE — 3074F PR MOST RECENT SYSTOLIC BLOOD PRESSURE < 130 MM HG: ICD-10-PCS | Mod: CPTII,,, | Performed by: FAMILY MEDICINE

## 2023-04-21 PROCEDURE — G0008 ADMIN INFLUENZA VIRUS VAC: HCPCS | Mod: PBBFAC

## 2023-04-21 PROCEDURE — 1159F PR MEDICATION LIST DOCUMENTED IN MEDICAL RECORD: ICD-10-PCS | Mod: CPTII,,, | Performed by: FAMILY MEDICINE

## 2023-04-21 PROCEDURE — 1101F PR PT FALLS ASSESS DOC 0-1 FALLS W/OUT INJ PAST YR: ICD-10-PCS | Mod: CPTII,,, | Performed by: FAMILY MEDICINE

## 2023-04-21 PROCEDURE — 99213 OFFICE O/P EST LOW 20 MIN: CPT | Mod: PBBFAC | Performed by: FAMILY MEDICINE

## 2023-04-21 PROCEDURE — 3008F BODY MASS INDEX DOCD: CPT | Mod: CPTII,,, | Performed by: FAMILY MEDICINE

## 2023-04-21 PROCEDURE — 3074F SYST BP LT 130 MM HG: CPT | Mod: CPTII,,, | Performed by: FAMILY MEDICINE

## 2023-04-21 PROCEDURE — 99214 OFFICE O/P EST MOD 30 MIN: CPT | Mod: S$PBB,,, | Performed by: FAMILY MEDICINE

## 2023-04-21 PROCEDURE — 99214 PR OFFICE/OUTPT VISIT, EST, LEVL IV, 30-39 MIN: ICD-10-PCS | Mod: S$PBB,,, | Performed by: FAMILY MEDICINE

## 2023-04-21 PROCEDURE — 90677 PCV20 VACCINE IM: CPT | Mod: PBBFAC

## 2023-04-21 PROCEDURE — 3288F FALL RISK ASSESSMENT DOCD: CPT | Mod: CPTII,,, | Performed by: FAMILY MEDICINE

## 2023-04-21 PROCEDURE — G0009 ADMIN PNEUMOCOCCAL VACCINE: HCPCS | Mod: PBBFAC

## 2023-04-21 PROCEDURE — 1159F MED LIST DOCD IN RCRD: CPT | Mod: CPTII,,, | Performed by: FAMILY MEDICINE

## 2023-04-21 PROCEDURE — 3078F DIAST BP <80 MM HG: CPT | Mod: CPTII,,, | Performed by: FAMILY MEDICINE

## 2023-04-21 PROCEDURE — 90750 HZV VACC RECOMBINANT IM: CPT | Mod: PBBFAC

## 2023-04-21 PROCEDURE — 90472 IMMUNIZATION ADMIN EACH ADD: CPT | Mod: PBBFAC

## 2023-04-21 PROCEDURE — 1101F PT FALLS ASSESS-DOCD LE1/YR: CPT | Mod: CPTII,,, | Performed by: FAMILY MEDICINE

## 2023-04-21 PROCEDURE — 3288F PR FALLS RISK ASSESSMENT DOCUMENTED: ICD-10-PCS | Mod: CPTII,,, | Performed by: FAMILY MEDICINE

## 2023-04-21 RX ORDER — SPIRONOLACTONE 50 MG/1
50 TABLET, FILM COATED ORAL 2 TIMES DAILY
Qty: 180 TABLET | Refills: 1 | Status: SHIPPED | OUTPATIENT
Start: 2023-04-21 | End: 2023-10-18

## 2023-04-21 RX ORDER — AMLODIPINE BESYLATE 10 MG/1
10 TABLET ORAL DAILY
Qty: 90 TABLET | Refills: 1 | Status: SHIPPED | OUTPATIENT
Start: 2023-04-21 | End: 2023-10-23 | Stop reason: SDUPTHER

## 2023-04-21 RX ORDER — ATORVASTATIN CALCIUM 20 MG/1
20 TABLET, FILM COATED ORAL DAILY
Qty: 90 TABLET | Refills: 1 | Status: SHIPPED | OUTPATIENT
Start: 2023-04-21 | End: 2023-08-28

## 2023-04-21 RX ADMIN — PNEUMOCOCCAL 20-VALENT CONJUGATE VACCINE 0.5 ML
2.2; 2.2; 2.2; 2.2; 2.2; 2.2; 2.2; 2.2; 2.2; 2.2; 2.2; 2.2; 2.2; 2.2; 2.2; 2.2; 4.4; 2.2; 2.2; 2.2 INJECTION, SUSPENSION INTRAMUSCULAR at 08:04

## 2023-04-21 RX ADMIN — ZOSTER VACCINE RECOMBINANT, ADJUVANTED 0.5 ML: KIT at 08:04

## 2023-04-21 RX ADMIN — INFLUENZA A VIRUS A/MICHIGAN/45/2015 X-275 (H1N1) ANTIGEN (FORMALDEHYDE INACTIVATED), INFLUENZA A VIRUS A/SINGAPORE/INFIMH-16-0019/2016 IVR-186 (H3N2) ANTIGEN (FORMALDEHYDE INACTIVATED), AND INFLUENZA B VIRUS B/MARYLAND/15/2016 BX-69A (A B/COLORADO/6/2017-LIKE VIRUS) ANTIGEN (FORMALDEHYDE INACTIVATED) 0.5 ML: 60; 60; 60 INJECTION, SUSPENSION INTRAMUSCULAR at 08:04

## 2023-04-21 NOTE — PROGRESS NOTES
"Patient Name: Maira Terry   : 1955  MRN: [id number]     SUBJECTIVE:  Maira Terry is a 67 y.o. female here for Follow-up  .    HPI  PMH HTN, obesity, CKD, prediabetes, Depression that presents for follow up.  Feels well.   Compliant with meds. On amlodipine and aldactone for HTN. At home checking and is normal.  Trying to eat better and watch salt intake.   Follows with behavioral health regarding depression.  Feels better.   Sees orthopedics for knee OA. Can not go far away without pain. Does have advanced osteoarthritis, tricompartmental. Requesting renewal of handicap card. Eventually might need surgery per otho. Has done PT. Going to have injections.  Had colonoscopy in . Was told to repeat in 3 years.      ALLERGIES:   Review of patient's allergies indicates:   Allergen Reactions    Hydrocodone-acetaminophen Nausea And Vomiting         ROS:  Review of Systems   Constitutional:  Negative for chills, fever and weight loss.   HENT:  Negative for congestion, ear pain and sore throat.    Eyes:  Negative for blurred vision and pain.   Respiratory:  Negative for cough, shortness of breath and wheezing.    Cardiovascular:  Negative for chest pain, palpitations, leg swelling and PND.   Gastrointestinal:  Negative for abdominal pain, blood in stool, constipation, diarrhea, nausea and vomiting.   Genitourinary:  Negative for dysuria, flank pain and hematuria.   Musculoskeletal:  Positive for joint pain. Negative for falls and myalgias.   Skin:  Negative for rash.   Neurological:  Negative for dizziness, focal weakness and headaches.   Psychiatric/Behavioral:  Negative for depression and substance abuse. The patient is not nervous/anxious.        OBJECTIVE:  Vital signs  Vitals:    23 0745   BP: 126/79   Pulse: 86   Resp: 18   Temp: 97.4 °F (36.3 °C)   TempSrc: Oral   SpO2: 100%   Weight: 107.5 kg (237 lb)   Height: 5' 9" (1.753 m)      Body mass index is 35 kg/m².    PHYSICAL EXAM:   Physical " Exam  Vitals reviewed.   Constitutional:       General: She is not in acute distress.     Appearance: Normal appearance. She is obese. She is not ill-appearing.   HENT:      Head: Normocephalic and atraumatic.      Right Ear: External ear normal.      Left Ear: External ear normal.      Nose: Nose normal. No rhinorrhea.      Mouth/Throat:      Mouth: Mucous membranes are moist.   Eyes:      General: No scleral icterus.        Right eye: No discharge.         Left eye: No discharge.      Conjunctiva/sclera: Conjunctivae normal.      Pupils: Pupils are equal, round, and reactive to light.   Cardiovascular:      Rate and Rhythm: Normal rate and regular rhythm.      Heart sounds: No murmur heard.  Pulmonary:      Effort: Pulmonary effort is normal. No respiratory distress.      Breath sounds: No wheezing, rhonchi or rales.   Abdominal:      General: Bowel sounds are normal. There is no distension.      Palpations: Abdomen is soft.      Tenderness: There is no abdominal tenderness.   Musculoskeletal:         General: Tenderness (both knees, medial and lateral compartments. no effusion seen. calf muscles somewhat wasted because of lack of exercising due to knee pain) present. No swelling. Normal range of motion.      Cervical back: Normal range of motion and neck supple. No rigidity or tenderness.      Right lower leg: No edema.      Left lower leg: No edema.   Skin:     General: Skin is warm.      Coloration: Skin is not pale.      Findings: No rash.   Neurological:      General: No focal deficit present.      Mental Status: She is alert and oriented to person, place, and time.      Sensory: No sensory deficit.      Motor: No weakness.   Psychiatric:         Mood and Affect: Mood normal.         Behavior: Behavior normal.        ASSESSMENT/PLAN:  1. Hypertension, unspecified type  -     Lipid Panel; Future; Expected date: 04/21/2023  -     CBC Auto Differential; Future; Expected date: 04/21/2023  -     Comprehensive  Metabolic Panel; Future; Expected date: 04/21/2023  -     TSH; Future; Expected date: 04/21/2023  -     amLODIPine (NORVASC) 10 MG tablet; Take 1 tablet (10 mg total) by mouth once daily.  Dispense: 90 tablet; Refill: 1  -     spironolactone (ALDACTONE) 50 MG tablet; Take 1 tablet (50 mg total) by mouth 2 (two) times daily.  Dispense: 180 tablet; Refill: 1    2. Prediabetes  -     Hemoglobin A1C; Future; Expected date: 04/21/2023    3. Primary osteoarthritis of both knees    4. Chronic kidney disease, unspecified CKD stage    5. Encounter for hepatitis C screening test for low risk patient  -     Hepatitis C Antibody; Future; Expected date: 04/21/2023    6. Immunization due  -     pneumoc 20-yury conj-dip cr(PF) (PREVNAR-20 (PF)) injection Syrg 0.5 mL  -     varicella-zoster gE-AS01B (PF)(SHINGRIX) 50 mcg/0.5 mL injection  -     influenza (HIGH-DOSE PF) vaccine 0.5 mL    7. Hyperlipidemia, unspecified hyperlipidemia type  -     atorvastatin (LIPITOR) 20 MG tablet; Take 1 tablet (20 mg total) by mouth once daily.  Dispense: 90 tablet; Refill: 1    8. Hypercalcemia  -     Calcium, Ionized; Future; Expected date: 04/21/2023  -     PTH, Intact; Future; Expected date: 04/21/2023       PLAN  - well controlled HTN.  Continue with amlodipine and spironolactone.  Check labs.  -continue to follow up with Orthopedics.  Apply diclofenac gel.  Limit/avoid NSAIDs given the underlying CKD.  Can take Tylenol as needed.  Okay to renew handicap placard, given advanced osteoarthritis of both knees limiting mobility.  -update preventive healthcare.  Immunizations given.    Updated labs today with elevated cholesterol, started Lipitor.  Also labs remarkable for hypercalcemia.  Check PTH, ionized calcium.      Previous medical history/lab work/radiology reviewed and considered during medical management decisions.   Medication list reviewed and medication reconciliation performed.  Patient was provided  and care about his/her  current diagnosis (es) and medications including risk/benefit and side effects/adverse events, over the counter medication uses/doses, home self-care and contact precautions,  and red flags and indications for when to seek immediate medical attention.   Patient was advised to continue compliance with current medication list and medical recommendations.  Recommended/ Advised continued compliance with recommended eating habits/ diets for medical conditions and exercise 150 minutes/ week (if possible) for medical condition (s).        RESULTS:  Recent Results (from the past 1008 hour(s))   Hepatitis C Antibody    Collection Time: 04/21/23  8:35 AM   Result Value Ref Range    Hepatitis C Antibody Nonreactive Nonreactive   Hemoglobin A1C    Collection Time: 04/21/23  8:35 AM   Result Value Ref Range    Hemoglobin A1c 5.1 <=7.0 %    Estimated Average Glucose 99.7 mg/dL   Lipid Panel    Collection Time: 04/21/23  8:35 AM   Result Value Ref Range    Cholesterol Total 236 (H) <=200 mg/dL    HDL Cholesterol 67 (H) 35 - 60 mg/dL    Triglyceride 101 37 - 140 mg/dL    Cholesterol/HDL Ratio 4 0 - 5    Very Low Density Lipoprotein 20     LDL Cholesterol 149.00 (H) 50.00 - 140.00 mg/dL   Comprehensive Metabolic Panel    Collection Time: 04/21/23  8:35 AM   Result Value Ref Range    Sodium Level 141 136 - 145 mmol/L    Potassium Level 4.1 3.5 - 5.1 mmol/L    Chloride 105 98 - 107 mmol/L    Carbon Dioxide 26 23 - 31 mmol/L    Glucose Level 101 82 - 115 mg/dL    Blood Urea Nitrogen 15.7 9.8 - 20.1 mg/dL    Creatinine 1.10 (H) 0.55 - 1.02 mg/dL    Calcium Level Total 10.7 (H) 8.4 - 10.2 mg/dL    Protein Total 7.9 (H) 5.8 - 7.6 gm/dL    Albumin Level 4.0 3.4 - 4.8 g/dL    Globulin 3.9 (H) 2.4 - 3.5 gm/dL    Albumin/Globulin Ratio 1.0 (L) 1.1 - 2.0 ratio    Bilirubin Total 0.6 <=1.5 mg/dL    Alkaline Phosphatase 144 40 - 150 unit/L    Alanine Aminotransferase 24 0 - 55 unit/L    Aspartate Aminotransferase 25 5 - 34 unit/L    eGFR 56  mls/min/1.73/m2   TSH    Collection Time: 04/21/23  8:35 AM   Result Value Ref Range    Thyroid Stimulating Hormone 1.787 0.350 - 4.940 uIU/mL   CBC with Differential    Collection Time: 04/21/23  8:35 AM   Result Value Ref Range    WBC 9.0 4.5 - 11.5 x10(3)/mcL    RBC 4.76 4.20 - 5.40 x10(6)/mcL    Hgb 14.0 12.0 - 16.0 g/dL    Hct 44.9 37.0 - 47.0 %    MCV 94.3 (H) 80.0 - 94.0 fL    MCH 29.4 27.0 - 31.0 pg    MCHC 31.2 (L) 33.0 - 36.0 g/dL    RDW 12.3 11.5 - 17.0 %    Platelet 285 130 - 400 x10(3)/mcL    MPV 10.6 (H) 7.4 - 10.4 fL    Neut % 61.3 %    Lymph % 28.1 %    Mono % 6.5 %    Eos % 3.3 %    Basophil % 0.6 %    Lymph # 2.53 0.6 - 4.6 x10(3)/mcL    Neut # 5.52 2.1 - 9.2 x10(3)/mcL    Mono # 0.59 0.1 - 1.3 x10(3)/mcL    Eos # 0.30 0 - 0.9 x10(3)/mcL    Baso # 0.05 0 - 0.2 x10(3)/mcL    IG# 0.02 0 - 0.04 x10(3)/mcL    IG% 0.2 %    NRBC% 0.0 %         Follow Up:  Follow up in about 6 months (around 10/21/2023) for htn.     [unfilled]    This note was created with the assistance of a voice recognition software or phone dictation. There may be transcription errors as a result of using this technology however minimal. Effort has been made to assure accuracy of transcription but any obvious errors or omissions should be clarified with the author of the document

## 2023-04-24 ENCOUNTER — TELEPHONE (OUTPATIENT)
Dept: FAMILY MEDICINE | Facility: CLINIC | Age: 68
End: 2023-04-24
Payer: MEDICARE

## 2023-04-24 NOTE — TELEPHONE ENCOUNTER
Called patient via phone call and patient understands results given. No further questions at this time.   ----- Message from Berta Lopez MD sent at 4/21/2023  4:16 PM CDT -----  Please let the patient know that labs are remarkable for elevated cholesterol.  Will start a medication called Lipitor, sent to the pharmacy.  Also, calcium remains elevated, so I added 2 other blood work to further evaluate the cause.  She can have them done whenever available and does not need to fast.  Thanks

## 2023-05-05 ENCOUNTER — TELEPHONE (OUTPATIENT)
Dept: FAMILY MEDICINE | Facility: CLINIC | Age: 68
End: 2023-05-05
Payer: MEDICARE

## 2023-05-05 ENCOUNTER — OFFICE VISIT (OUTPATIENT)
Dept: URGENT CARE | Facility: CLINIC | Age: 68
End: 2023-05-05
Payer: MEDICARE

## 2023-05-05 VITALS
TEMPERATURE: 98 F | OXYGEN SATURATION: 97 % | SYSTOLIC BLOOD PRESSURE: 123 MMHG | HEART RATE: 68 BPM | WEIGHT: 237 LBS | DIASTOLIC BLOOD PRESSURE: 79 MMHG | BODY MASS INDEX: 35.1 KG/M2 | RESPIRATION RATE: 18 BRPM | HEIGHT: 69 IN

## 2023-05-05 DIAGNOSIS — R05.9 COUGH, UNSPECIFIED TYPE: ICD-10-CM

## 2023-05-05 DIAGNOSIS — U07.1 COVID-19: ICD-10-CM

## 2023-05-05 DIAGNOSIS — J02.9 SORE THROAT: Primary | ICD-10-CM

## 2023-05-05 LAB
CTP QC/QA: YES
MOLECULAR STREP A: NEGATIVE
POC MOLECULAR INFLUENZA A AGN: NEGATIVE
POC MOLECULAR INFLUENZA B AGN: NEGATIVE
SARS-COV-2 RDRP RESP QL NAA+PROBE: POSITIVE

## 2023-05-05 PROCEDURE — 87651 STREP A DNA AMP PROBE: CPT | Mod: QW,,, | Performed by: NURSE PRACTITIONER

## 2023-05-05 PROCEDURE — 87635 SARS-COV-2 COVID-19 AMP PRB: CPT | Mod: QW,,, | Performed by: NURSE PRACTITIONER

## 2023-05-05 PROCEDURE — 87651 POCT STREP A MOLECULAR: ICD-10-PCS | Mod: QW,,, | Performed by: NURSE PRACTITIONER

## 2023-05-05 PROCEDURE — 87502 INFLUENZA DNA AMP PROBE: CPT | Mod: QW,,, | Performed by: NURSE PRACTITIONER

## 2023-05-05 PROCEDURE — 99214 PR OFFICE/OUTPT VISIT, EST, LEVL IV, 30-39 MIN: ICD-10-PCS | Mod: ,,, | Performed by: NURSE PRACTITIONER

## 2023-05-05 PROCEDURE — 87502 POCT INFLUENZA A/B MOLECULAR: ICD-10-PCS | Mod: QW,,, | Performed by: NURSE PRACTITIONER

## 2023-05-05 PROCEDURE — 87635: ICD-10-PCS | Mod: QW,,, | Performed by: NURSE PRACTITIONER

## 2023-05-05 PROCEDURE — 99214 OFFICE O/P EST MOD 30 MIN: CPT | Mod: ,,, | Performed by: NURSE PRACTITIONER

## 2023-05-05 RX ORDER — PROMETHAZINE HYDROCHLORIDE AND DEXTROMETHORPHAN HYDROBROMIDE 6.25; 15 MG/5ML; MG/5ML
5 SYRUP ORAL EVERY 4 HOURS PRN
Qty: 180 ML | Refills: 0 | Status: SHIPPED | OUTPATIENT
Start: 2023-05-05 | End: 2023-05-15

## 2023-05-05 NOTE — PROGRESS NOTES
"Subjective:      Patient ID: Maira Terry is a 67 y.o. female.    Vitals:  height is 5' 9" (1.753 m) and weight is 107.5 kg (237 lb). Her oral temperature is 97.9 °F (36.6 °C). Her blood pressure is 123/79 and her pulse is 68. Her respiration is 18 and oxygen saturation is 97%.     Chief Complaint: Sore Throat (Sore throat, headache, chest congestion, cough x 2 days )    67-year-old female presents with sinus congestion, pressure, and cough.  Onset 2 days ago.  No shortness of breath or fever    HENT:  Positive for sinus pain, sinus pressure and sore throat.    Respiratory:  Positive for cough.     Objective:     Physical Exam   Constitutional: She is oriented to person, place, and time. She appears well-developed. She is cooperative.  Non-toxic appearance. She does not appear ill. No distress.   HENT:   Head: Normocephalic and atraumatic.   Ears:   Right Ear: Hearing, tympanic membrane, external ear and ear canal normal.   Left Ear: Hearing, tympanic membrane, external ear and ear canal normal.   Nose: Nose normal. No mucosal edema, rhinorrhea or nasal deformity. No epistaxis. Right sinus exhibits no maxillary sinus tenderness and no frontal sinus tenderness. Left sinus exhibits no maxillary sinus tenderness and no frontal sinus tenderness.   Mouth/Throat: Uvula is midline, oropharynx is clear and moist and mucous membranes are normal. No trismus in the jaw. Normal dentition. No uvula swelling. No oropharyngeal exudate, posterior oropharyngeal edema or posterior oropharyngeal erythema.   Eyes: Conjunctivae and lids are normal. No scleral icterus.   Neck: Trachea normal and phonation normal. Neck supple. No edema present. No erythema present. No neck rigidity present.   Cardiovascular: Normal rate, regular rhythm, normal heart sounds and normal pulses.   Pulmonary/Chest: Effort normal and breath sounds normal. No respiratory distress. She has no decreased breath sounds. She has no rhonchi.   Abdominal: Normal " appearance.   Musculoskeletal: Normal range of motion.         General: No deformity. Normal range of motion.   Neurological: She is alert and oriented to person, place, and time. She exhibits normal muscle tone. Coordination normal.   Skin: Skin is warm, dry, intact, not diaphoretic and not pale.   Psychiatric: Her speech is normal and behavior is normal. Judgment and thought content normal.   Nursing note and vitals reviewed.    Assessment:     1. Sore throat    2. COVID-19      Office Visit on 05/05/2023   Component Date Value Ref Range Status    POC Rapid COVID 05/05/2023 Positive (A)  Negative Final     Acceptable 05/05/2023 Yes   Final    POC Molecular Influenza A Ag 05/05/2023 Negative  Negative, Not Reported Final    POC Molecular Influenza B Ag 05/05/2023 Negative  Negative, Not Reported Final     Acceptable 05/05/2023 Yes   Final    Molecular Strep A, POC 05/05/2023 Negative  Negative Final     Acceptable 05/05/2023 Yes   Final        Plan:   Take Mucinex as directed for cough.  Or prescription promethazine DM preferably at night    Increase oral fluids, take daily vitamins as directed, Self quarantine for 5 days.    Go to ER for worsening symptoms including shortness of breath, fever, or worsening symptoms.       Sore throat  -     POCT COVID-19 Rapid Screening  -     POCT Influenza A/B Molecular  -     POCT Strep A, Molecular    COVID-19

## 2023-05-05 NOTE — PATIENT INSTRUCTIONS
Take Mucinex as directed for cough.  Or prescription promethazine DM preferably at night    Increase oral fluids, take daily vitamins as directed, Self quarantine for 5 days.    Go to ER for worsening symptoms including shortness of breath, fever, or worsening symptoms.     POSITIVE COVID TEST    You have tested positive for COVID-19 today.  Please note that patients who test positive for COVID-19 are required by the CDC to undergo isolation for 5 days     However, if you are asymptomatic (a person who does not have any symptoms) and COVID-19 positive, your 5-day isolation begins on the day you tested positive, regardless of exposure date.    Also, per the CDC guidelines, once your 5 days have passed, and you have not had fever greater than 100.4F in the last 24 hours without taking any fever reducers such as Tylenol (Acetaminophen) or Motrin (Ibuprofen), you may return to your normal activities including social distancing, wearing masks, and frequent handwashing - YOU DO NOT NEED ANOTHER TEST IN ORDER TO END YOUR QUARANTINE.

## 2023-05-05 NOTE — TELEPHONE ENCOUNTER
----- Message from Juliana Nesbitt sent at 5/5/2023  1:52 PM CDT -----  Regarding: resend script  General Phone Message    Caller is:  ( ) Patient  (  ) Family  ( x ) [facility]    (  ) Home Health  (  ) Other     Provider: Dr. Nakia Lopez    Last Visit:    Next Visit: 10/23/2023    Reason for Call: Pharmacy states they didn't receive the script for atorvastatin. They are asking if we can resend it.                   Best Time to Contact:    Preferred Phone Number:

## 2023-05-12 ENCOUNTER — DOCUMENTATION ONLY (OUTPATIENT)
Dept: ADMINISTRATIVE | Facility: HOSPITAL | Age: 68
End: 2023-05-12
Payer: MEDICARE

## 2023-08-28 DIAGNOSIS — E78.5 HYPERLIPIDEMIA, UNSPECIFIED HYPERLIPIDEMIA TYPE: ICD-10-CM

## 2023-08-28 RX ORDER — ATORVASTATIN CALCIUM 20 MG/1
20 TABLET, FILM COATED ORAL
Qty: 90 TABLET | Refills: 1 | Status: SHIPPED | OUTPATIENT
Start: 2023-08-28 | End: 2023-10-23

## 2023-09-27 ENCOUNTER — OFFICE VISIT (OUTPATIENT)
Dept: ORTHOPEDICS | Facility: CLINIC | Age: 68
End: 2023-09-27
Payer: MEDICARE

## 2023-09-27 ENCOUNTER — HOSPITAL ENCOUNTER (OUTPATIENT)
Dept: RADIOLOGY | Facility: HOSPITAL | Age: 68
Discharge: HOME | End: 2023-09-27
Attending: STUDENT IN AN ORGANIZED HEALTH CARE EDUCATION/TRAINING PROGRAM
Payer: MEDICARE

## 2023-09-27 VITALS
SYSTOLIC BLOOD PRESSURE: 129 MMHG | HEART RATE: 68 BPM | WEIGHT: 240 LBS | BODY MASS INDEX: 35.55 KG/M2 | HEIGHT: 69 IN | DIASTOLIC BLOOD PRESSURE: 79 MMHG

## 2023-09-27 DIAGNOSIS — M17.0 PRIMARY OSTEOARTHRITIS OF BOTH KNEES: ICD-10-CM

## 2023-09-27 DIAGNOSIS — M17.0 PRIMARY OSTEOARTHRITIS OF BOTH KNEES: Primary | ICD-10-CM

## 2023-09-27 PROCEDURE — 99213 OFFICE O/P EST LOW 20 MIN: CPT | Mod: PBBFAC,25

## 2023-09-27 PROCEDURE — 20610 DRAIN/INJ JOINT/BURSA W/O US: CPT | Mod: 50,PBBFAC

## 2023-09-27 PROCEDURE — 73564 X-RAY EXAM KNEE 4 OR MORE: CPT | Mod: TC,LT

## 2023-09-27 PROCEDURE — 73564 X-RAY EXAM KNEE 4 OR MORE: CPT | Mod: TC,RT

## 2023-09-27 RX ADMIN — Medication 30 MG: at 09:09

## 2023-09-27 NOTE — PROGRESS NOTES
"Subjective:    Patient ID: Maira Terry is a 68 y.o. female  who presented to [facility] for follow up.    Chief Complaint: Bilateral knee pain    History of Present Illness:  Maira Terry who has a history of Bilateral knee Osteoarthritis  presented today with with bilateral knee pain for the past 1 year. Pain is located in the Patella, medial and lateral joint line bilaterally. Quality of pain is described as Aching, 8/10 intensity bilaterally. Patient denies any inciting events. Pain is aggravated by any weight bearing, going up and down stairs, rising after sitting, standing, and walking. Patient reports that she feels unstable on her feet, and reports a recent fall 2 weeks ago. She states that she hit her left knee with this fall.   Evaluation to date: plain films, PT evaluation, and Ortho evaluation. Patient has tried conservative management in the past  including  bracing, topical analgesics, oral analgesics, PT, home exercises, and ice/heat with minimal relief.   Expectations for today's visit includes exploring further treatment options. PCP is Dr. Berta Lopez.    Knee Review of Systems:  Swelling?  yes  Instability?  yes  Mechanical sx?  yes  <30 min AM stiffness? no  Limited ROM? yes  Fever/Chills? no     Objective:      Physical Exam:  /79   Pulse 68   Ht 5' 9" (1.753 m)   Wt 108.9 kg (240 lb)   BMI 35.44 kg/m²     Appearance:  Normal gait/station  FWB  Alignment: Left: normal Right: normal   Soft tissue swelling: Left: no Right: no  Effusion: Left:  Negative Right: Negative  Erythema: Left no Right: no  Ecchymosis: Left: no Right: no  Atrophy: Left: no Right: no    Palpation:  Knee Tenderness: Left: Medial joint line, Lateral joint line, and Patella Right: Medial joint line, Lateral joint line, and Patella    Range of motion:  Flexion (140): Left:  100 Right: 100  Extension (0): Left: 0 Right: 0    Strength:  Extension: Left 5/5  " Pain: yes     Right 5/5 Pain: yes  Flexion: Left 5/5 Pain: no Right   5/5 Pain: no    Special Tests:  Ballotable Effusion:Left: Negative Right: Negative   Fluid Wave: Left: Negative Right: Negative   Crepitus: Left: Negative Right: Positive   Patellar grind test: Left: Negative  Right: Negative  Apprehension test: Left: Not performed Right: Not performed   Varus: @ 0, Left Negative Right: Negative.  @ 30, Left Negative  Right Negative   Valgus: @ 0, Left Negative Right: Negative.  @ 30, Left Negative  Right Negative  Lachman: Left: Negative Right: Negative   Ant Drawer: Left: Negative Right: Negative   Posterior Drawer: Left: Negative Right: Negative   Dial Test: Left: Not performed Right: Not performed   Boy: Left: Positive Right: Positive    General appearance: NAD  Peripheral pulses: normal bilaterally   Reflexes: Left: normal Right normal   Sensation: normal    Labs:  Last A1c: 5.1     Imaging:   Previous images reviewed.  X-rays ordered and performed today: yes  # of views: 4 Laterality: bilateral  Impression : Bilateral - Joint space narrowing worse in the lateral joint line, Osteophytes, subchondral sclerosis and cysts, suggestive of degenerative disease. KL grade 3 bilaterally    Assessment:        Encounter Diagnoses   Code Name Primary?    M17.0 Primary osteoarthritis of both knees Yes        Plan:     Orders Placed This Encounter   Procedures    X-Ray Knee Complete 4 Or More Views Bilat     Standing Status:   Future     Standing Expiration Date:   9/27/2024     Order Specific Question:   May the Radiologist modify the order per protocol to meet the clinical needs of the patient?     Answer:   Yes     Order Specific Question:   Release to patient     Answer:   Immediate        Dx: Bilateral Knee Osteoarthritis.    Treatment Plan: Discussed with patient diagnosis, prognosis, and treatment recommendations. Education provided.    - Bilateral VSI series started; first injection given today. Patient will  return next week for second injection and subsequently for 3rd.  - Script for walking cane provided  - Home physical therapy exercise handouts provided to patient.   - Over the counter NSAID and/or tylenol provided you do not have contraindications such as but not limited to liver or kidney disease or uncontrolled blood pressure. If you're doctors have told you to to not take them based on your health, do not take them.   - Using a brace provided to you here or from your local pharmacy or durable medical equipment (DME) store.   Imaging: radiological studies ordered and independently reviewed; discussed with patient; pending radiologist interpretation.   Weight Management: is paramount. Recommend to discuss with PCP about medication and bariatric surgery options for weight loss if your BMI is >35 and applicable. A BMI of <24.9 may provide further relief..   Procedure: Discussed CSI/VSI as treatment options; discussed CSI vs VS injections as treatment options; since conservative measures did not improve symptoms patient consented for starting VS series today.  Activity: Activity as tolerated; HEP to include aerobic conditioning and strength training with non-painful activity. ROM/STG exercises. Proper footware; assistive devises to avoid limping.   Therapy: No formal therapy  Medication: START over-the-counter acetaminophen (Tylenol 1000 mg three times per day as needed)  CONTINUE over-the-counter acetaminophen (Tylenol 1000 mg three times per day as needed)  CONTINUE Voltaren Gel 1% as prescribed. Please see your primary care physician for further refills.  RTC: 1 week for 2nd dose of Orthovisc series.            Large Joint Aspiration/Injection: bilateral knee    Date/Time: 9/27/2023 8:30 AM    Performed by: Esther Celaya MD  Authorized by: Angelo Lopez MD    Consent Done?:  Yes (Written)  Indications:  Arthritis and pain  Site marked: the procedure site was marked    Timeout: prior to procedure the  correct patient, procedure, and site was verified    Prep: patient was prepped and draped in usual sterile fashion      Details:  Needle Size:  21 G  Approach:  Anteromedial  Location:  Knee  Laterality:  Bilateral  Site:  Bilateral knee     Staff: Angelo Lopez MD    Risks:  Possible complications with the injection include bleeding, infection (.01%), tendon rupture, steroid flare, fat pad or soft tissue atrophy, skin depigmentation, allergic reaction to medications and vasovagal response. (steroid flare treatment is rest, ice, NSAIDs and resolves in 24-36 hours.)    Consent:  No absolute contraindications (cellulitis overlying joint, infection, lack of informed consent, allergy to injection medication, AVN protein or egg allergy for sodium hyaluronate, or history of steroid flare) or relative contraindications (uncontrolled DM2 A1c>10, coagulopathy, INR > 3.5, previous joint replacement or history of AVN).        Description:  The patient was prepped in normal sterile fashion use of chlorhexidine scrub and the appropriate and anatomic landmarks were identified without ultrasound.  Contents of syringe included: 3ml of 30mg/ml of Orthovisc injected.     Post Procedure: Patient alert, and moving all extremities. ROM improved, pain decreased.  Good peripheral pulses, no signs of vascular compromise and range of motion intact.  Aftercare instructions were given to patient at time of discharge.  Relative rest for 3 days-avoiding excess activity.  Place ice on the area for 15 minutes every 4-6 hours. Patient may take Tylenol a 1000 mg b.i.d. or ibuprofen 600 mg t.i.d. for the next 3-4 days if not on medication already and safe to take pending co-morbidities.  Protect the area for the next 1-8 hours if anesthetic was used.  Avoid excessive activity for the next 3-4 weeks.  ER precautions given for fever, severe joint pain or allergic reaction or other new symptoms related to the joint injection.           Esther HAQUE  KAYLEIGH Celaya  [facility] Resident, [facility]

## 2023-10-04 ENCOUNTER — OFFICE VISIT (OUTPATIENT)
Dept: ORTHOPEDICS | Facility: CLINIC | Age: 68
End: 2023-10-04
Payer: MEDICARE

## 2023-10-04 VITALS
WEIGHT: 240 LBS | HEIGHT: 69 IN | DIASTOLIC BLOOD PRESSURE: 76 MMHG | BODY MASS INDEX: 35.55 KG/M2 | TEMPERATURE: 98 F | HEART RATE: 59 BPM | SYSTOLIC BLOOD PRESSURE: 121 MMHG

## 2023-10-04 DIAGNOSIS — M17.0 BILATERAL PRIMARY OSTEOARTHRITIS OF KNEE: Primary | ICD-10-CM

## 2023-10-04 PROCEDURE — 20610 DRAIN/INJ JOINT/BURSA W/O US: CPT | Mod: 50,PBBFAC

## 2023-10-04 PROCEDURE — 99213 OFFICE O/P EST LOW 20 MIN: CPT | Mod: PBBFAC,25

## 2023-10-04 RX ADMIN — Medication 30 MG: at 09:10

## 2023-10-04 NOTE — PROGRESS NOTES
Large Joint Aspiration/Injection: bilateral knee    Date/Time: 10/4/2023 9:30 AM    Performed by: Esther Celaya MD  Authorized by: Angelo Lopez MD    Consent Done?:  Yes (Written)  Indications:  Arthritis and pain  Site marked: the procedure site was marked    Timeout: prior to procedure the correct patient, procedure, and site was verified    Prep: patient was prepped and draped in usual sterile fashion      Details:  Needle Size:  21 G  Ultrasonic Guidance for needle placement?: No    Approach:  Anterolateral  Location:  Knee  Laterality:  Bilateral  Site:  Bilateral knee     Staff: Angelo Lopez MD    Risks:  Possible complications with the injection include bleeding, infection (.01%), tendon rupture, steroid flare, fat pad or soft tissue atrophy, skin depigmentation, allergic reaction to medications and vasovagal response. (steroid flare treatment is rest, ice, NSAIDs and resolves in 24-36 hours.)    Consent:  No absolute contraindications (cellulitis overlying joint, infection, lack of informed consent, allergy to injection medication, AVN protein or egg allergy for sodium hyaluronate, or history of steroid flare) or relative contraindications (uncontrolled DM2 A1c>10, coagulopathy, INR > 3.5, previous joint replacement or history of AVN).        Description:  The patient was prepped in normal sterile fashion use of chlorhexidine scrub and the appropriate and anatomic landmarks were identified without ultrasound.  Contents of syringe included: 3ml of 30mg/ml of Orthovisc injected.     Post Procedure: Patient alert, and moving all extremities. ROM improved, pain decreased.  Good peripheral pulses, no signs of vascular compromise and range of motion intact.  Aftercare instructions were given to patient at time of discharge.  Relative rest for 3 days-avoiding excess activity.  Place ice on the area for 15 minutes every 4-6 hours. Patient may take Tylenol a 1000 mg b.i.d. or ibuprofen 600 mg t.i.d.  for the next 3-4 days if not on medication already and safe to take pending co-morbidities.  Protect the area for the next 1-8 hours if anesthetic was used.  Avoid excessive activity for the next 3-4 weeks.  ER precautions given for fever, severe joint pain or allergic reaction or other new symptoms related to the joint injection.           Esther Celaya M.D  [facility] Resident, [facility]

## 2023-10-05 ENCOUNTER — OFFICE VISIT (OUTPATIENT)
Dept: BEHAVIORAL HEALTH | Facility: CLINIC | Age: 68
End: 2023-10-05
Payer: MEDICARE

## 2023-10-05 VITALS
HEIGHT: 69 IN | WEIGHT: 238.81 LBS | TEMPERATURE: 98 F | DIASTOLIC BLOOD PRESSURE: 75 MMHG | BODY MASS INDEX: 35.37 KG/M2 | SYSTOLIC BLOOD PRESSURE: 117 MMHG | HEART RATE: 76 BPM

## 2023-10-05 DIAGNOSIS — F34.1 PERSISTENT DEPRESSIVE DISORDER: ICD-10-CM

## 2023-10-05 PROCEDURE — 3078F DIAST BP <80 MM HG: CPT | Mod: CPTII,,, | Performed by: NURSE PRACTITIONER

## 2023-10-05 PROCEDURE — 3074F PR MOST RECENT SYSTOLIC BLOOD PRESSURE < 130 MM HG: ICD-10-PCS | Mod: CPTII,,, | Performed by: NURSE PRACTITIONER

## 2023-10-05 PROCEDURE — 99213 OFFICE O/P EST LOW 20 MIN: CPT | Mod: PBBFAC,PN | Performed by: NURSE PRACTITIONER

## 2023-10-05 PROCEDURE — 1160F RVW MEDS BY RX/DR IN RCRD: CPT | Mod: CPTII,,, | Performed by: NURSE PRACTITIONER

## 2023-10-05 PROCEDURE — 1159F MED LIST DOCD IN RCRD: CPT | Mod: CPTII,,, | Performed by: NURSE PRACTITIONER

## 2023-10-05 PROCEDURE — 3288F PR FALLS RISK ASSESSMENT DOCUMENTED: ICD-10-PCS | Mod: CPTII,,, | Performed by: NURSE PRACTITIONER

## 2023-10-05 PROCEDURE — 1160F PR REVIEW ALL MEDS BY PRESCRIBER/CLIN PHARMACIST DOCUMENTED: ICD-10-PCS | Mod: CPTII,,, | Performed by: NURSE PRACTITIONER

## 2023-10-05 PROCEDURE — 1159F PR MEDICATION LIST DOCUMENTED IN MEDICAL RECORD: ICD-10-PCS | Mod: CPTII,,, | Performed by: NURSE PRACTITIONER

## 2023-10-05 PROCEDURE — 3288F FALL RISK ASSESSMENT DOCD: CPT | Mod: CPTII,,, | Performed by: NURSE PRACTITIONER

## 2023-10-05 PROCEDURE — 99212 PR OFFICE/OUTPT VISIT, EST, LEVL II, 10-19 MIN: ICD-10-PCS | Mod: S$PBB,,, | Performed by: NURSE PRACTITIONER

## 2023-10-05 PROCEDURE — 3044F HG A1C LEVEL LT 7.0%: CPT | Mod: CPTII,,, | Performed by: NURSE PRACTITIONER

## 2023-10-05 PROCEDURE — 3008F BODY MASS INDEX DOCD: CPT | Mod: CPTII,,, | Performed by: NURSE PRACTITIONER

## 2023-10-05 PROCEDURE — 99212 OFFICE O/P EST SF 10 MIN: CPT | Mod: S$PBB,,, | Performed by: NURSE PRACTITIONER

## 2023-10-05 PROCEDURE — 3008F PR BODY MASS INDEX (BMI) DOCUMENTED: ICD-10-PCS | Mod: CPTII,,, | Performed by: NURSE PRACTITIONER

## 2023-10-05 PROCEDURE — 3074F SYST BP LT 130 MM HG: CPT | Mod: CPTII,,, | Performed by: NURSE PRACTITIONER

## 2023-10-05 PROCEDURE — 3078F PR MOST RECENT DIASTOLIC BLOOD PRESSURE < 80 MM HG: ICD-10-PCS | Mod: CPTII,,, | Performed by: NURSE PRACTITIONER

## 2023-10-05 PROCEDURE — 1101F PT FALLS ASSESS-DOCD LE1/YR: CPT | Mod: CPTII,,, | Performed by: NURSE PRACTITIONER

## 2023-10-05 PROCEDURE — 3044F PR MOST RECENT HEMOGLOBIN A1C LEVEL <7.0%: ICD-10-PCS | Mod: CPTII,,, | Performed by: NURSE PRACTITIONER

## 2023-10-05 PROCEDURE — 1101F PR PT FALLS ASSESS DOC 0-1 FALLS W/OUT INJ PAST YR: ICD-10-PCS | Mod: CPTII,,, | Performed by: NURSE PRACTITIONER

## 2023-10-05 RX ORDER — FLUOXETINE HYDROCHLORIDE 40 MG/1
40 CAPSULE ORAL DAILY
Qty: 90 CAPSULE | Refills: 1 | Status: SHIPPED | OUTPATIENT
Start: 2023-10-05

## 2023-10-05 RX ORDER — FLUOXETINE HYDROCHLORIDE 20 MG/1
20 CAPSULE ORAL DAILY
Qty: 90 CAPSULE | Refills: 1 | Status: SHIPPED | OUTPATIENT
Start: 2023-10-05

## 2023-10-05 NOTE — PROGRESS NOTES
Faculty Attestation: Maira Terry  was seen in Sports Medicine Clinic. Discussed with resident at the time of the visit. History of Present Illness, Physical Exam, and Assessment and Plan reviewed. Treatment plan is reasonable and appropriate. Compliance with treatment recommendations is important.  No imaging studies were done today.  Procedure note reviewed. Present for entire procedure with the resident. Patient tolerated procedure well.      Angelo Lopez MD  Sports Medicine
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
## 2023-10-12 ENCOUNTER — OFFICE VISIT (OUTPATIENT)
Dept: ORTHOPEDICS | Facility: CLINIC | Age: 68
End: 2023-10-12
Payer: MEDICARE

## 2023-10-12 VITALS
SYSTOLIC BLOOD PRESSURE: 127 MMHG | WEIGHT: 238 LBS | DIASTOLIC BLOOD PRESSURE: 81 MMHG | BODY MASS INDEX: 35.25 KG/M2 | HEART RATE: 76 BPM | HEIGHT: 69 IN

## 2023-10-12 DIAGNOSIS — I10 HYPERTENSION, UNSPECIFIED TYPE: ICD-10-CM

## 2023-10-12 DIAGNOSIS — M17.0 BILATERAL PRIMARY OSTEOARTHRITIS OF KNEE: Primary | ICD-10-CM

## 2023-10-12 PROCEDURE — 99213 OFFICE O/P EST LOW 20 MIN: CPT | Mod: PBBFAC | Performed by: STUDENT IN AN ORGANIZED HEALTH CARE EDUCATION/TRAINING PROGRAM

## 2023-10-12 PROCEDURE — 20610 DRAIN/INJ JOINT/BURSA W/O US: CPT | Mod: 50,PBBFAC | Performed by: STUDENT IN AN ORGANIZED HEALTH CARE EDUCATION/TRAINING PROGRAM

## 2023-10-12 RX ADMIN — Medication 30 MG: at 02:10

## 2023-10-12 NOTE — PROGRESS NOTES
Large Joint Aspiration/Injection: bilateral supra patellar bursa    Date/Time: 10/12/2023 1:40 PM    Performed by: Chris Wynn MD  Authorized by: Chris Wynn MD    Consent Done?:  Yes (Written)  Indications:  Arthritis  Site marked: the procedure site was marked    Timeout: prior to procedure the correct patient, procedure, and site was verified      Local anesthesia used?: Yes    Local anesthetic:  Topical anesthetic    Details:  Needle Size:  21 G  Location:  Knee  Laterality:  Bilateral  Site:  Bilateral supra patellar bursa  Patient tolerance:  Patient tolerated the procedure well with no immediate complications     Staff: Chris Wynn MD     Risks:  Possible complications with the injection include bleeding, infection (.01%), tendon rupture, steroid flare, fat pad or soft tissue atrophy, skin depigmentation, allergic reaction to medications and vasovagal response. (steroid flare treatment is rest, ice, NSAIDs and resolves in 24-36 hours.)    Consent:  No absolute contraindications (cellulitis overlying joint, infection, lack of informed consent, allergy to injection medication, AVN protein or egg allergy for sodium hyaluronate, or history of steroid flare) or relative contraindications (uncontrolled DM2 A1c>10, coagulopathy, INR > 3.5, previous joint replacement or history of AVN).        Description:  The patient was prepped in normal sterile fashion use of chlorhexidine scrub and the appropriate and anatomic landmarks were identified with ultrasound.  Contents of syringe included: orthovisc    Post Procedure: Patient alert, and moving all extremities. ROM improved, pain decreased.  Good peripheral pulses, no signs of vascular compromise and range of motion intact.  Aftercare instructions were given to patient at time of discharge.  Relative rest for 3 days-avoiding excess activity.  Place ice on the area for 15 minutes every 4-6 hours. Patient may take Tylenol a 1000 mg b.i.d. or ibuprofen  600 mg t.i.d. for the next 3-4 days if not on medication already and safe to take pending co-morbidities.  Protect the area for the next 1-8 hours if anesthetic was used.  Avoid excessive activity for the next 3-4 weeks.  ER precautions given for fever, severe joint pain or allergic reaction or other new symptoms related to the joint injection.

## 2023-10-18 RX ORDER — SPIRONOLACTONE 50 MG/1
50 TABLET, FILM COATED ORAL 2 TIMES DAILY
Qty: 180 TABLET | Refills: 0 | Status: SHIPPED | OUTPATIENT
Start: 2023-10-18 | End: 2024-01-26

## 2023-10-23 ENCOUNTER — OFFICE VISIT (OUTPATIENT)
Dept: FAMILY MEDICINE | Facility: CLINIC | Age: 68
End: 2023-10-23
Payer: MEDICARE

## 2023-10-23 VITALS
BODY MASS INDEX: 35.7 KG/M2 | SYSTOLIC BLOOD PRESSURE: 118 MMHG | HEIGHT: 69 IN | RESPIRATION RATE: 18 BRPM | OXYGEN SATURATION: 100 % | TEMPERATURE: 98 F | DIASTOLIC BLOOD PRESSURE: 77 MMHG | WEIGHT: 241 LBS | HEART RATE: 72 BPM

## 2023-10-23 DIAGNOSIS — E78.5 HYPERLIPIDEMIA, UNSPECIFIED HYPERLIPIDEMIA TYPE: ICD-10-CM

## 2023-10-23 DIAGNOSIS — E83.52 HYPERCALCEMIA: ICD-10-CM

## 2023-10-23 DIAGNOSIS — I10 HYPERTENSION, UNSPECIFIED TYPE: Primary | ICD-10-CM

## 2023-10-23 DIAGNOSIS — Z23 IMMUNIZATION DUE: ICD-10-CM

## 2023-10-23 DIAGNOSIS — Z12.31 ENCOUNTER FOR SCREENING MAMMOGRAM FOR MALIGNANT NEOPLASM OF BREAST: ICD-10-CM

## 2023-10-23 LAB
ALBUMIN SERPL-MCNC: 3.5 G/DL (ref 3.4–4.8)
ALBUMIN/GLOB SERPL: 1 RATIO (ref 1.1–2)
ALP SERPL-CCNC: 123 UNIT/L (ref 40–150)
ALT SERPL-CCNC: 23 UNIT/L (ref 0–55)
AST SERPL-CCNC: 23 UNIT/L (ref 5–34)
BILIRUB SERPL-MCNC: 0.6 MG/DL
BUN SERPL-MCNC: 17.6 MG/DL (ref 9.8–20.1)
CALCIUM SERPL-MCNC: 10 MG/DL (ref 8.4–10.2)
CHLORIDE SERPL-SCNC: 108 MMOL/L (ref 98–107)
CHOLEST SERPL-MCNC: 197 MG/DL
CHOLEST/HDLC SERPL: 3 {RATIO} (ref 0–5)
CO2 SERPL-SCNC: 26 MMOL/L (ref 23–31)
CREAT SERPL-MCNC: 1 MG/DL (ref 0.55–1.02)
GFR SERPLBLD CREATININE-BSD FMLA CKD-EPI: >60 MLS/MIN/1.73/M2
GLOBULIN SER-MCNC: 3.6 GM/DL (ref 2.4–3.5)
GLUCOSE SERPL-MCNC: 98 MG/DL (ref 82–115)
HDLC SERPL-MCNC: 63 MG/DL (ref 35–60)
LDLC SERPL CALC-MCNC: 124 MG/DL (ref 50–140)
POTASSIUM SERPL-SCNC: 4.4 MMOL/L (ref 3.5–5.1)
PROT SERPL-MCNC: 7.1 GM/DL (ref 5.8–7.6)
SODIUM SERPL-SCNC: 140 MMOL/L (ref 136–145)
TRIGL SERPL-MCNC: 51 MG/DL (ref 37–140)
VLDLC SERPL CALC-MCNC: 10 MG/DL

## 2023-10-23 PROCEDURE — 99214 PR OFFICE/OUTPT VISIT, EST, LEVL IV, 30-39 MIN: ICD-10-PCS | Mod: S$PBB,,, | Performed by: FAMILY MEDICINE

## 2023-10-23 PROCEDURE — 90750 HZV VACC RECOMBINANT IM: CPT | Mod: PBBFAC

## 2023-10-23 PROCEDURE — 3288F PR FALLS RISK ASSESSMENT DOCUMENTED: ICD-10-PCS | Mod: CPTII,,, | Performed by: FAMILY MEDICINE

## 2023-10-23 PROCEDURE — 1101F PT FALLS ASSESS-DOCD LE1/YR: CPT | Mod: CPTII,,, | Performed by: FAMILY MEDICINE

## 2023-10-23 PROCEDURE — 80053 COMPREHEN METABOLIC PANEL: CPT | Performed by: FAMILY MEDICINE

## 2023-10-23 PROCEDURE — 3074F PR MOST RECENT SYSTOLIC BLOOD PRESSURE < 130 MM HG: ICD-10-PCS | Mod: CPTII,,, | Performed by: FAMILY MEDICINE

## 2023-10-23 PROCEDURE — 3044F HG A1C LEVEL LT 7.0%: CPT | Mod: CPTII,,, | Performed by: FAMILY MEDICINE

## 2023-10-23 PROCEDURE — 82330 ASSAY OF CALCIUM: CPT | Performed by: FAMILY MEDICINE

## 2023-10-23 PROCEDURE — 36415 COLL VENOUS BLD VENIPUNCTURE: CPT | Performed by: FAMILY MEDICINE

## 2023-10-23 PROCEDURE — 1160F PR REVIEW ALL MEDS BY PRESCRIBER/CLIN PHARMACIST DOCUMENTED: ICD-10-PCS | Mod: CPTII,,, | Performed by: FAMILY MEDICINE

## 2023-10-23 PROCEDURE — 80061 LIPID PANEL: CPT | Performed by: FAMILY MEDICINE

## 2023-10-23 PROCEDURE — 3078F PR MOST RECENT DIASTOLIC BLOOD PRESSURE < 80 MM HG: ICD-10-PCS | Mod: CPTII,,, | Performed by: FAMILY MEDICINE

## 2023-10-23 PROCEDURE — 99214 OFFICE O/P EST MOD 30 MIN: CPT | Mod: S$PBB,,, | Performed by: FAMILY MEDICINE

## 2023-10-23 PROCEDURE — 1159F MED LIST DOCD IN RCRD: CPT | Mod: CPTII,,, | Performed by: FAMILY MEDICINE

## 2023-10-23 PROCEDURE — 3288F FALL RISK ASSESSMENT DOCD: CPT | Mod: CPTII,,, | Performed by: FAMILY MEDICINE

## 2023-10-23 PROCEDURE — 1160F RVW MEDS BY RX/DR IN RCRD: CPT | Mod: CPTII,,, | Performed by: FAMILY MEDICINE

## 2023-10-23 PROCEDURE — 3008F PR BODY MASS INDEX (BMI) DOCUMENTED: ICD-10-PCS | Mod: CPTII,,, | Performed by: FAMILY MEDICINE

## 2023-10-23 PROCEDURE — 90472 IMMUNIZATION ADMIN EACH ADD: CPT | Mod: PBBFAC

## 2023-10-23 PROCEDURE — 3008F BODY MASS INDEX DOCD: CPT | Mod: CPTII,,, | Performed by: FAMILY MEDICINE

## 2023-10-23 PROCEDURE — G0008 ADMIN INFLUENZA VIRUS VAC: HCPCS | Mod: PBBFAC

## 2023-10-23 PROCEDURE — 90694 VACC AIIV4 NO PRSRV 0.5ML IM: CPT | Mod: PBBFAC

## 2023-10-23 PROCEDURE — 1159F PR MEDICATION LIST DOCUMENTED IN MEDICAL RECORD: ICD-10-PCS | Mod: CPTII,,, | Performed by: FAMILY MEDICINE

## 2023-10-23 PROCEDURE — 3074F SYST BP LT 130 MM HG: CPT | Mod: CPTII,,, | Performed by: FAMILY MEDICINE

## 2023-10-23 PROCEDURE — 3078F DIAST BP <80 MM HG: CPT | Mod: CPTII,,, | Performed by: FAMILY MEDICINE

## 2023-10-23 PROCEDURE — 1101F PR PT FALLS ASSESS DOC 0-1 FALLS W/OUT INJ PAST YR: ICD-10-PCS | Mod: CPTII,,, | Performed by: FAMILY MEDICINE

## 2023-10-23 PROCEDURE — 99214 OFFICE O/P EST MOD 30 MIN: CPT | Mod: PBBFAC | Performed by: FAMILY MEDICINE

## 2023-10-23 PROCEDURE — 3044F PR MOST RECENT HEMOGLOBIN A1C LEVEL <7.0%: ICD-10-PCS | Mod: CPTII,,, | Performed by: FAMILY MEDICINE

## 2023-10-23 RX ORDER — AMLODIPINE BESYLATE 10 MG/1
10 TABLET ORAL DAILY
Qty: 90 TABLET | Refills: 1 | Status: SHIPPED | OUTPATIENT
Start: 2023-10-23

## 2023-10-23 RX ADMIN — INFLUENZA A VIRUS A/VICTORIA/4897/2022 IVR-238 (H1N1) ANTIGEN (FORMALDEHYDE INACTIVATED), INFLUENZA A VIRUS A/DARWIN/6/2021 IVR-227 (H3N2) ANTIGEN (FORMALDEHYDE INACTIVATED), INFLUENZA B VIRUS B/AUSTRIA/1359417/2021 BVR-26 ANTIGEN (FORMALDEHYDE INACTIVATED), INFLUENZA B VIRUS B/PHUKET/3073/2013 BVR-1B ANTIGEN (FORMALDEHYDE INACTIVATED) 0.5 ML: 15; 15; 15; 15 INJECTION, SUSPENSION INTRAMUSCULAR at 09:10

## 2023-10-23 RX ADMIN — ZOSTER VACCINE RECOMBINANT, ADJUVANTED 0.5 ML: KIT at 09:10

## 2023-10-23 NOTE — PROGRESS NOTES
Patient Name: Maira Terry   : 1955  MRN: [id number]     SUBJECTIVE:  Maira Terry is a 68 y.o. female here for Follow-up (The patient states that her blood pressure readings at home been normal, and she stopped taking her atorvastatin because it makes her chest tight when taking it.)  .    HPI  Here for routine follow-up  On amlodipine and aldactone for HTN. Well controlled, normal readings at home too.  Follows with behavioral health regarding depression.  Feels better.     Also few months ago started on Lipitor after the elevated lipid panel.  The 10-year ASCVD risk score (Montserrat AUGUSTE, et al., 2019) is: 9.8%    Values used to calculate the score:      Age: 68 years      Sex: Female      Is Non- : Yes      Diabetic: No      Tobacco smoker: No      Systolic Blood Pressure: 118 mmHg      Is BP treated: Yes      HDL Cholesterol: 67 mg/dL      Total Cholesterol: 236 mg/dL  Stopped taking it because it was making her chest feel tight. As soon as she stopped it, no more issues. Watching diet a little bit more closely.        ALLERGIES:   Review of patient's allergies indicates:   Allergen Reactions    Hydrocodone-acetaminophen Nausea And Vomiting         ROS:  Review of Systems   Constitutional:  Negative for chills and fever.   HENT:  Negative for congestion.    Eyes:  Negative for blurred vision.   Respiratory:  Negative for cough and shortness of breath.    Cardiovascular:  Negative for chest pain, palpitations and leg swelling.   Gastrointestinal:  Negative for abdominal pain, blood in stool, diarrhea, nausea and vomiting.   Genitourinary:  Negative for dysuria and hematuria.   Neurological:  Negative for dizziness and headaches.   Psychiatric/Behavioral:  Negative for depression. The patient is not nervous/anxious.          OBJECTIVE:  Vital signs  Vitals:    10/23/23 0816   BP: 118/77   Pulse: 72   Resp: 18   Temp: 97.5 °F (36.4 °C)   TempSrc: Oral   SpO2: 100%   Weight: 109.3  "kg (241 lb)   Height: 5' 9" (1.753 m)      Body mass index is 35.59 kg/m².    PHYSICAL EXAM:   Physical Exam  Vitals reviewed.   Constitutional:       General: She is not in acute distress.     Appearance: Normal appearance. She is obese. She is not ill-appearing.   HENT:      Head: Normocephalic and atraumatic.      Nose: Nose normal.      Mouth/Throat:      Mouth: Mucous membranes are moist.   Eyes:      General: No scleral icterus.        Right eye: No discharge.         Left eye: No discharge.      Conjunctiva/sclera: Conjunctivae normal.      Pupils: Pupils are equal, round, and reactive to light.   Cardiovascular:      Rate and Rhythm: Normal rate and regular rhythm.   Pulmonary:      Effort: Pulmonary effort is normal. No respiratory distress.      Breath sounds: No wheezing, rhonchi or rales.   Abdominal:      General: Bowel sounds are normal. There is no distension.      Palpations: Abdomen is soft.      Tenderness: There is no abdominal tenderness.   Musculoskeletal:      Cervical back: Normal range of motion and neck supple. No rigidity or tenderness.      Right lower leg: No edema.      Left lower leg: No edema.   Skin:     General: Skin is warm.      Coloration: Skin is not pale.      Findings: No rash.   Neurological:      General: No focal deficit present.      Mental Status: She is alert and oriented to person, place, and time.   Psychiatric:         Mood and Affect: Mood normal.         Behavior: Behavior normal.          ASSESSMENT/PLAN:  1. Hypertension, unspecified type  Well-controlled.  Continue with amlodipine 10 mg daily and spironolactone 50 mg daily.  Recheck CMP.  -     Comprehensive Metabolic Panel  -     amLODIPine (NORVASC) 10 MG tablet; Take 1 tablet (10 mg total) by mouth once daily.  Dispense: 90 tablet; Refill: 1    2. Hyperlipidemia, unspecified hyperlipidemia type  Could not tolerate atorvastatin.  Recheck lipid panel.  If worsening, will consider Crestor.  Continue to watch diet " closely.  Okay to be off of Lipitor  -     Lipid Panel    3. Hypercalcemia  Recheck CMP and ionized calcium.  Will do further workup if elevated.  -     Calcium, Ionized; Future; Expected date: 10/23/2023  -     Calcium, Ionized  -     Calcium, Ionized    4. Immunization due  -     varicella-zoster gE-AS01B (PF)(SHINGRIX) 50 mcg/0.5 mL injection  -     influenza 65up-adj (QUADRIVALENT ADJUVANTED PF) vaccine 0.5 mL    5. Encounter for screening mammogram for malignant neoplasm of breast  -     Mammo Digital Screening Bilat w/ [facility]; Future; Expected date: 10/23/2023          Previous medical history/lab work/radiology reviewed and considered during medical management decisions.   Medication list reviewed and medication reconciliation performed.  Patient was provided  and care about his/her current diagnosis (es) and medications including risk/benefit and side effects/adverse events, over the counter medication uses/doses, home self-care and contact precautions,  and red flags and indications for when to seek immediate medical attention.   Patient was advised to continue compliance with current medication list and medical recommendations.  Recommended/ Advised continued compliance with recommended eating habits/ diets for medical conditions and exercise 150 minutes/ week (if possible) for medical condition (s).        RESULTS:  No results found for this or any previous visit (from the past 1008 hour(s)).      Follow Up:  Follow up in about 6 months (around 4/23/2024).     [unfilled]    This note was created with the assistance of a voice recognition software or phone dictation. There may be transcription errors as a result of using this technology however minimal. Effort has been made to assure accuracy of transcription but any obvious errors or omissions should be clarified with the author of the document

## 2023-10-24 LAB — CA-I ADJ PH7.4 SERPL ISE-MCNC: 5.55 MG/DL (ref 4.57–5.43)

## 2023-10-25 ENCOUNTER — TELEPHONE (OUTPATIENT)
Dept: ORTHOPEDICS | Facility: CLINIC | Age: 68
End: 2023-10-25
Payer: MEDICARE

## 2023-10-25 NOTE — TELEPHONE ENCOUNTER
Please call and schedule patient for Orthovisc kennedy knee     Authorization #  [id number] good from 10/18/2023 to 10/18/24

## 2023-11-03 ENCOUNTER — HOSPITAL ENCOUNTER (OUTPATIENT)
Dept: RADIOLOGY | Facility: HOSPITAL | Age: 68
Discharge: HOME | End: 2023-11-03
Attending: FAMILY MEDICINE
Payer: MEDICARE

## 2023-11-03 DIAGNOSIS — Z12.31 ENCOUNTER FOR SCREENING MAMMOGRAM FOR MALIGNANT NEOPLASM OF BREAST: ICD-10-CM

## 2023-11-03 PROCEDURE — 77067 SCR MAMMO BI INCL CAD: CPT | Mod: 26,,, | Performed by: RADIOLOGY

## 2023-11-03 PROCEDURE — 77063 MAMMO DIGITAL SCREENING BILAT WITH TOMO: ICD-10-PCS | Mod: 26,,, | Performed by: RADIOLOGY

## 2023-11-03 PROCEDURE — 77067 SCR MAMMO BI INCL CAD: CPT | Mod: TC

## 2023-11-03 PROCEDURE — 77067 MAMMO DIGITAL SCREENING BILAT WITH TOMO: ICD-10-PCS | Mod: 26,,, | Performed by: RADIOLOGY

## 2023-11-03 PROCEDURE — 77063 BREAST TOMOSYNTHESIS BI: CPT | Mod: 26,,, | Performed by: RADIOLOGY

## 2023-11-07 ENCOUNTER — TELEPHONE (OUTPATIENT)
Dept: FAMILY MEDICINE | Facility: CLINIC | Age: 68
End: 2023-11-07
Payer: MEDICARE

## 2023-11-07 NOTE — TELEPHONE ENCOUNTER
----- Message from Berta Lopez MD sent at 11/7/2023  7:46 AM CST -----  Please let the patient know that her labs have actually improved, with normalized lipid panel.  Continue to watch diet.  Also calcium normal/slightly over normal limit.  Thanks

## 2024-01-25 DIAGNOSIS — I10 HYPERTENSION, UNSPECIFIED TYPE: ICD-10-CM

## 2024-01-26 RX ORDER — SPIRONOLACTONE 50 MG/1
50 TABLET, FILM COATED ORAL 2 TIMES DAILY
Qty: 180 TABLET | Refills: 0 | Status: SHIPPED | OUTPATIENT
Start: 2024-01-26 | End: 2024-04-23 | Stop reason: SDUPTHER

## 2024-04-05 ENCOUNTER — OFFICE VISIT (OUTPATIENT)
Dept: BEHAVIORAL HEALTH | Facility: CLINIC | Age: 69
End: 2024-04-05
Payer: MEDICARE

## 2024-04-05 VITALS
WEIGHT: 240.19 LBS | HEIGHT: 69 IN | DIASTOLIC BLOOD PRESSURE: 75 MMHG | HEART RATE: 76 BPM | BODY MASS INDEX: 35.58 KG/M2 | SYSTOLIC BLOOD PRESSURE: 116 MMHG | TEMPERATURE: 98 F

## 2024-04-05 DIAGNOSIS — F34.1 PERSISTENT DEPRESSIVE DISORDER: ICD-10-CM

## 2024-04-05 PROCEDURE — 1159F MED LIST DOCD IN RCRD: CPT | Mod: CPTII,,, | Performed by: NURSE PRACTITIONER

## 2024-04-05 PROCEDURE — 99212 OFFICE O/P EST SF 10 MIN: CPT | Mod: S$PBB,,, | Performed by: NURSE PRACTITIONER

## 2024-04-05 PROCEDURE — 3008F BODY MASS INDEX DOCD: CPT | Mod: CPTII,,, | Performed by: NURSE PRACTITIONER

## 2024-04-05 PROCEDURE — 3078F DIAST BP <80 MM HG: CPT | Mod: CPTII,,, | Performed by: NURSE PRACTITIONER

## 2024-04-05 PROCEDURE — 99213 OFFICE O/P EST LOW 20 MIN: CPT | Mod: PBBFAC,PN | Performed by: NURSE PRACTITIONER

## 2024-04-05 PROCEDURE — 3074F SYST BP LT 130 MM HG: CPT | Mod: CPTII,,, | Performed by: NURSE PRACTITIONER

## 2024-04-05 PROCEDURE — 1160F RVW MEDS BY RX/DR IN RCRD: CPT | Mod: CPTII,,, | Performed by: NURSE PRACTITIONER

## 2024-04-05 RX ORDER — FLUOXETINE HYDROCHLORIDE 40 MG/1
80 CAPSULE ORAL DAILY
Qty: 30 CAPSULE | Refills: 3 | Status: SHIPPED | OUTPATIENT
Start: 2024-04-05
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
## 2024-04-23 ENCOUNTER — OFFICE VISIT (OUTPATIENT)
Dept: FAMILY MEDICINE | Facility: CLINIC | Age: 69
End: 2024-04-23
Payer: MEDICARE

## 2024-04-23 VITALS
SYSTOLIC BLOOD PRESSURE: 124 MMHG | DIASTOLIC BLOOD PRESSURE: 82 MMHG | BODY MASS INDEX: 35.55 KG/M2 | HEIGHT: 69 IN | TEMPERATURE: 98 F | RESPIRATION RATE: 18 BRPM | WEIGHT: 240 LBS | OXYGEN SATURATION: 100 % | HEART RATE: 77 BPM

## 2024-04-23 DIAGNOSIS — R73.03 PREDIABETES: ICD-10-CM

## 2024-04-23 DIAGNOSIS — Z87.19 HISTORY OF RECTAL POLYPECTOMY: ICD-10-CM

## 2024-04-23 DIAGNOSIS — R09.82 POSTNASAL DRIP: ICD-10-CM

## 2024-04-23 DIAGNOSIS — Z78.0 POSTMENOPAUSAL: ICD-10-CM

## 2024-04-23 DIAGNOSIS — I10 PRIMARY HYPERTENSION: Primary | ICD-10-CM

## 2024-04-23 DIAGNOSIS — Z98.890 HISTORY OF RECTAL POLYPECTOMY: ICD-10-CM

## 2024-04-23 DIAGNOSIS — Z80.0 FAMILY HISTORY OF COLON CANCER: ICD-10-CM

## 2024-04-23 PROCEDURE — 1101F PT FALLS ASSESS-DOCD LE1/YR: CPT | Mod: CPTII,,, | Performed by: FAMILY MEDICINE

## 2024-04-23 PROCEDURE — 99214 OFFICE O/P EST MOD 30 MIN: CPT | Mod: PBBFAC | Performed by: FAMILY MEDICINE

## 2024-04-23 PROCEDURE — 3079F DIAST BP 80-89 MM HG: CPT | Mod: CPTII,,, | Performed by: FAMILY MEDICINE

## 2024-04-23 PROCEDURE — 3074F SYST BP LT 130 MM HG: CPT | Mod: CPTII,,, | Performed by: FAMILY MEDICINE

## 2024-04-23 PROCEDURE — 3288F FALL RISK ASSESSMENT DOCD: CPT | Mod: CPTII,,, | Performed by: FAMILY MEDICINE

## 2024-04-23 PROCEDURE — 3008F BODY MASS INDEX DOCD: CPT | Mod: CPTII,,, | Performed by: FAMILY MEDICINE

## 2024-04-23 PROCEDURE — 1159F MED LIST DOCD IN RCRD: CPT | Mod: CPTII,,, | Performed by: FAMILY MEDICINE

## 2024-04-23 PROCEDURE — 99214 OFFICE O/P EST MOD 30 MIN: CPT | Mod: S$PBB,,, | Performed by: FAMILY MEDICINE

## 2024-04-23 RX ORDER — AMLODIPINE BESYLATE 10 MG/1
10 TABLET ORAL DAILY
Qty: 90 TABLET | Refills: 1 | Status: SHIPPED | OUTPATIENT
Start: 2024-04-23

## 2024-04-23 RX ORDER — FLUTICASONE PROPIONATE 50 MCG
1 SPRAY, SUSPENSION (ML) NASAL DAILY
Qty: 16 G | Refills: 0 | Status: SHIPPED | OUTPATIENT
Start: 2024-04-23

## 2024-04-23 RX ORDER — CETIRIZINE HYDROCHLORIDE 10 MG/1
10 TABLET ORAL DAILY
Qty: 90 TABLET | Refills: 0 | Status: SHIPPED | OUTPATIENT
Start: 2024-04-23

## 2024-04-23 RX ORDER — SPIRONOLACTONE 50 MG/1
50 TABLET, FILM COATED ORAL 2 TIMES DAILY
Qty: 180 TABLET | Refills: 1 | Status: SHIPPED | OUTPATIENT
Start: 2024-04-23

## 2024-04-23 NOTE — PROGRESS NOTES
"Patient Name: Maira Terry   : 1955  MRN: [id number]     SUBJECTIVE:  Maira Terry is a 68 y.o. female here for Follow-up (The patient states that she has had sinus drip for 2 days. )  .    HPI  Here for routine follow-up of HTN and sinus drip  On amlodipine and aldactone for HTN. Well controlled, normal readings at home too.  Regarding hyperlipidemia, patient could not tolerate Lipitor, but lipid panel was normalized after watching diet closely.  Follows with behavioral health regarding depression.  Feels well    Postnasal drip for the past 2 days. Does have seasonal allergies. Coughing more at night. No sinus pain. Some sore throat too. No ill contacts. No fever or chills. Had tonsillectomy in the past.     Not taking any vit D.         ALLERGIES:   Review of patient's allergies indicates:   Allergen Reactions    Hydrocodone-acetaminophen Nausea And Vomiting         ROS:  Review of Systems   Constitutional:  Negative for chills and fever.   HENT:  Positive for congestion, sinus pain and sore throat. Negative for ear pain.    Eyes:  Negative for blurred vision.   Respiratory:  Negative for cough and shortness of breath.    Cardiovascular:  Negative for chest pain and leg swelling.   Gastrointestinal:  Negative for abdominal pain, blood in stool, diarrhea, nausea and vomiting.   Genitourinary:  Negative for dysuria and hematuria.   Neurological:  Negative for dizziness and headaches.   Psychiatric/Behavioral:  Negative for depression. The patient is not nervous/anxious.          OBJECTIVE:  Vital signs  Vitals:    24 1007   BP: 124/82   Pulse: 77   Resp: 18   Temp: 97.5 °F (36.4 °C)   TempSrc: Oral   SpO2: 100%   Weight: 108.9 kg (240 lb)   Height: 5' 9" (1.753 m)      Body mass index is 35.44 kg/m².    PHYSICAL EXAM:   Physical Exam  Vitals reviewed.   Constitutional:       General: She is not in acute distress.     Appearance: Normal appearance. She is not ill-appearing.   HENT:      Head: " Normocephalic and atraumatic.      Right Ear: Tympanic membrane and external ear normal.      Left Ear: Tympanic membrane and external ear normal.      Nose: Congestion and rhinorrhea present.      Mouth/Throat:      Mouth: Mucous membranes are moist.      Pharynx: Posterior oropharyngeal erythema present. No oropharyngeal exudate.   Eyes:      General: No scleral icterus.        Right eye: No discharge.         Left eye: No discharge.      Conjunctiva/sclera: Conjunctivae normal.      Pupils: Pupils are equal, round, and reactive to light.   Cardiovascular:      Rate and Rhythm: Normal rate and regular rhythm.      Heart sounds: No murmur heard.  Pulmonary:      Effort: Pulmonary effort is normal. No respiratory distress.      Breath sounds: No wheezing, rhonchi or rales.   Abdominal:      General: Bowel sounds are normal. There is no distension.      Palpations: Abdomen is soft.      Tenderness: There is no abdominal tenderness.   Musculoskeletal:      Cervical back: Normal range of motion and neck supple. No rigidity or tenderness.      Right lower leg: No edema.      Left lower leg: No edema.   Skin:     General: Skin is warm.      Coloration: Skin is not pale.      Findings: No rash.   Neurological:      General: No focal deficit present.      Mental Status: She is alert and oriented to person, place, and time.   Psychiatric:         Mood and Affect: Mood normal.         Behavior: Behavior normal.          ASSESSMENT/PLAN:  1. Primary hypertension  Well-controlled.  Continue with amlodipine and spironolactone.  Check basic labs.  -     amLODIPine (NORVASC) 10 MG tablet; Take 1 tablet (10 mg total) by mouth once daily.  Dispense: 90 tablet; Refill: 1  -     spironolactone (ALDACTONE) 50 MG tablet; Take 1 tablet (50 mg total) by mouth 2 (two) times daily.  Dispense: 180 tablet; Refill: 1  -     CBC Auto Differential; Future; Expected date: 04/23/2024  -     Comprehensive Metabolic Panel; Future; Expected date:  04/23/2024  -     Lipid Panel; Future; Expected date: 04/23/2024  -     Hemoglobin A1C; Future; Expected date: 04/23/2024  -     TSH; Future; Expected date: 04/23/2024    2. Postnasal drip  Start Zyrtec and Flonase.  Does have seasonal allergies.  If no improvement or worsening of symptoms in 7-10 days, then will consider treatment for acute sinusitis likely bacterial, with antibiotic.  -     cetirizine (ZYRTEC) 10 MG tablet; Take 1 tablet (10 mg total) by mouth once daily.  Dispense: 90 tablet; Refill: 0  -     fluticasone propionate (FLONASE) 50 mcg/actuation nasal spray; 1 spray (50 mcg total) by Each Nostril route once daily.  Dispense: 16 g; Refill: 0    3. Prediabetes  Watch diet closely.  Recheck A1c.  -     Hemoglobin A1C; Future; Expected date: 04/23/2024    4. Family history of colon cancer  -     Ambulatory referral/consult to Gastroenterology; Future; Expected date: 04/30/2024    5. History of rectal polypectomy  -     Ambulatory referral/consult to Gastroenterology; Future; Expected date: 04/30/2024    6. Postmenopausal  -     DXA Bone Density Axial Skeleton 1 or more sites; Future; Expected date: 04/23/2024             Previous medical history/lab work/radiology reviewed and considered during medical management decisions.   Medication list reviewed and medication reconciliation performed.  Patient was provided  and care about his/her current diagnosis (es) and medications including risk/benefit and side effects/adverse events, over the counter medication uses/doses, home self-care and contact precautions,  and red flags and indications for when to seek immediate medical attention.   Patient was advised to continue compliance with current medication list and medical recommendations.  Recommended/ Advised continued compliance with recommended eating habits/ diets for medical conditions and exercise 150 minutes/ week (if possible) for medical condition (s).        RESULTS:  No results found for  this or any previous visit (from the past 1008 hour(s)).      Follow Up:  Follow up in about 6 months (around 10/23/2024).         This note was created with the assistance of a voice recognition software or phone dictation. There may be transcription errors as a result of using this technology however minimal. Effort has been made to assure accuracy of transcription but any obvious errors or omissions should be clarified with the author of the document

## 2024-06-17 ENCOUNTER — OFFICE VISIT (OUTPATIENT)
Dept: ORTHOPEDICS | Facility: CLINIC | Age: 69
End: 2024-06-17
Payer: MEDICARE

## 2024-06-17 VITALS
BODY MASS INDEX: 35.53 KG/M2 | HEIGHT: 69 IN | WEIGHT: 239.88 LBS | TEMPERATURE: 97 F | HEART RATE: 69 BPM | DIASTOLIC BLOOD PRESSURE: 76 MMHG | SYSTOLIC BLOOD PRESSURE: 119 MMHG

## 2024-06-17 DIAGNOSIS — M17.0 BILATERAL PRIMARY OSTEOARTHRITIS OF KNEE: Primary | ICD-10-CM

## 2024-06-17 PROCEDURE — 1160F RVW MEDS BY RX/DR IN RCRD: CPT | Mod: CPTII,,, | Performed by: NURSE PRACTITIONER

## 2024-06-17 PROCEDURE — 1125F AMNT PAIN NOTED PAIN PRSNT: CPT | Mod: CPTII,,, | Performed by: NURSE PRACTITIONER

## 2024-06-17 PROCEDURE — 3078F DIAST BP <80 MM HG: CPT | Mod: CPTII,,, | Performed by: NURSE PRACTITIONER

## 2024-06-17 PROCEDURE — 20610 DRAIN/INJ JOINT/BURSA W/O US: CPT | Mod: 50,PBBFAC | Performed by: NURSE PRACTITIONER

## 2024-06-17 PROCEDURE — 3288F FALL RISK ASSESSMENT DOCD: CPT | Mod: CPTII,,, | Performed by: NURSE PRACTITIONER

## 2024-06-17 PROCEDURE — 3074F SYST BP LT 130 MM HG: CPT | Mod: CPTII,,, | Performed by: NURSE PRACTITIONER

## 2024-06-17 PROCEDURE — 99215 OFFICE O/P EST HI 40 MIN: CPT | Mod: 25,S$PBB,, | Performed by: NURSE PRACTITIONER

## 2024-06-17 PROCEDURE — 3008F BODY MASS INDEX DOCD: CPT | Mod: CPTII,,, | Performed by: NURSE PRACTITIONER

## 2024-06-17 PROCEDURE — 1101F PT FALLS ASSESS-DOCD LE1/YR: CPT | Mod: CPTII,,, | Performed by: NURSE PRACTITIONER

## 2024-06-17 PROCEDURE — 99213 OFFICE O/P EST LOW 20 MIN: CPT | Mod: PBBFAC,25 | Performed by: NURSE PRACTITIONER

## 2024-06-17 PROCEDURE — 1159F MED LIST DOCD IN RCRD: CPT | Mod: CPTII,,, | Performed by: NURSE PRACTITIONER

## 2024-06-17 RX ORDER — TRIAMCINOLONE ACETONIDE 40 MG/ML
40 INJECTION, SUSPENSION INTRA-ARTICULAR; INTRAMUSCULAR
Status: DISCONTINUED | OUTPATIENT
Start: 2024-06-17 | End: 2024-06-17

## 2024-06-17 RX ORDER — LIDOCAINE HYDROCHLORIDE 10 MG/ML
5 INJECTION, SOLUTION EPIDURAL; INFILTRATION; INTRACAUDAL; PERINEURAL
Status: COMPLETED | OUTPATIENT
Start: 2024-06-17 | End: 2024-06-17

## 2024-06-17 RX ORDER — TRIAMCINOLONE ACETONIDE 40 MG/ML
40 INJECTION, SUSPENSION INTRA-ARTICULAR; INTRAMUSCULAR
Status: COMPLETED | OUTPATIENT
Start: 2024-06-17 | End: 2024-06-17

## 2024-06-17 RX ORDER — LIDOCAINE HYDROCHLORIDE 10 MG/ML
5 INJECTION, SOLUTION EPIDURAL; INFILTRATION; INTRACAUDAL; PERINEURAL
Status: DISCONTINUED | OUTPATIENT
Start: 2024-06-17 | End: 2024-06-17

## 2024-06-17 RX ORDER — CHOLECALCIFEROL (VITAMIN D3) 25 MCG
1000 TABLET ORAL DAILY
COMMUNITY

## 2024-06-17 RX ADMIN — TRIAMCINOLONE ACETONIDE 40 MG: 40 INJECTION, SUSPENSION INTRA-ARTICULAR; INTRAMUSCULAR at 10:06

## 2024-06-17 RX ADMIN — LIDOCAINE HYDROCHLORIDE 5 ML: 10 INJECTION, SOLUTION EPIDURAL; INFILTRATION; INTRACAUDAL; PERINEURAL at 10:06

## 2024-06-17 NOTE — PROGRESS NOTES
"   [facility]  Subjective:   PATIENT ID: Maira Terry is a 69 y.o. female.   CHIEF COMPLAINT: Knee Pain of the Left Knee (Here with Bilat knee pain. Pain level 8 . Pt. Did PT about a year ago  , pt says it didn't help. ) and Knee Pain of the Right Knee    HPI:  Bilateral L < R knee pain medial, latera, anterior stiffness and aching   Injury/ Surgical HX r/t CC:  no HX of prior significant joint injury   Onset: several years  fluctuates    Modifying Factors: exacerbated by:  increased activity, prolonged sitting, prolonged walking/ standing improved with:  movement in less than 30 minutes , rest   Associated Symptoms:  [] joint locking  [] joint catching  [x] decreased ROM  [x] crepitus [x] Weakness/ "giving out"  [] falls  [] swelling  [x] difficult sleeping s/t pain        Activity: sedentary with light activity and pain moderately interferes with ADLs, assistive device cane   Previous Treatments:  [x] HEP > 6 weeks  [x] RX PT 8 wks/ 3xs per week, completed 2023  [] Off-loading brace  [x] Soft knee splint [x] OTC Pain Relievers: Tylenol, ibuprofen  [x] RX Medications: topical diclofenac  [] CSI  [x] Hyaluronic acid injections since 2023, last injection 10/12/23   PMH:  non-smoker, obesity, HTN, and DM    Family History: + OA   NOTE:  Existing patient, new to me, last seen by [facility] on 10/12/23  for bilateral knee DJD.  Conservative treatments providing adequate relief at this time and is not interested in surgical treatment options at this time.  Vs injections  given 10/12/23 with adequate relief, lasting 1-1.5 months.  Symptoms returning recently over past few weeks and are affecting ADLs.  Requesting to try CSI  today for symptom relief.  Did not feel VS injections gave her good relief for long enough period of time. Reports knee feels weak and gives out.   Employment HX: teacher, currently retired.     Current Outpatient Medications:     amLODIPine " "(NORVASC) 10 MG tablet, Take 1 tablet (10 mg total) by mouth once daily., Disp: 90 tablet, Rfl: 1    cetirizine (ZYRTEC) 10 MG tablet, Take 1 tablet (10 mg total) by mouth once daily., Disp: 90 tablet, Rfl: 0    FLUoxetine 40 MG capsule, Take 2 capsules (80 mg total) by mouth once daily., Disp: 30 capsule, Rfl: 3    fluticasone propionate (FLONASE) 50 mcg/actuation nasal spray, 1 spray (50 mcg total) by Each Nostril route once daily., Disp: 16 g, Rfl: 0    spironolactone (ALDACTONE) 50 MG tablet, Take 1 tablet (50 mg total) by mouth 2 (two) times daily., Disp: 180 tablet, Rfl: 1    vitamin D (VITAMIN D3) 1000 units Tab, Take 1,000 Units by mouth once daily., Disp: , Rfl:     diclofenac sodium (VOLTAREN) 1 % Gel, Apply 2 g topically 4 (four) times daily as needed (pain). Do not exceed 32 grams/day: do not to exceed 8 grams/day/single joint of upper extremities; do not to exceed 16 grams/day/single joint of lower extremities.  Please request refill of this medication from your PCP., Disp: 100 g, Rfl: 3  Review of patient's allergies indicates:   Allergen Reactions    Hydrocodone-acetaminophen Nausea And Vomiting     REVIEW OF SYSTEMS:  A ten-point review of systems was performed and is negative, except as mentioned above   Objective:   Body mass index is 35.42 kg/m².   Vitals:    06/17/24 0852 06/17/24 0856   BP: 119/76    Pulse: 69    Temp: 97.4 °F (36.3 °C)    TempSrc: Oral    Weight: 108.8 kg (239 lb 13.8 oz)    Height: 5' 9" (1.753 m)    PainSc:    8     MSK Knee Exam  General:  no apparent distress, no pain indicators,  obesity  Inspection: lower extremities in proportion with overall body habitus, no erythema   ,  no erythema, limping gait w/ full weight partial (cane use in clinic today)  LEFT KNEE RIGHT KNEE   [x] Swelling mild  [x] Varus deformity  [] Rash [] Contusion  [] Mass  [] Scars [x] Swelling mild  [x] Varus deformity  [] Rash [] Contusion  [] Mass  [] Scars   Palpation:     LEFT KNEE RIGHT KNEE "   Joint Warmth: normal  POMT: lateral joint line    [x] Patellar grind with compression  [x] Crepitus  [] Joint effusion  [x] Quad atrophy  [] Active mechanical locking with joint movement  [] Popliteal fullness  [] Tenderness medial joint line  [x] Tenderness lateral joint line  [] Tenderness of tibial plateau   [] Tenderness of patellar tendon  [] Tenderness of quadriceps tendon  [] Tenderness of prepatellar   [] Tenderness of infrapatellar  [] Tenderness of anserine bursae  [x] Tenderness of patellar facet  [] Tenderness over lateral retinaculum Joint Warmth: normal  POMT: lateral joint line  [x] Patellar grind with compression  [x] Crepitus  [] Joint effusion  [x] Quad atrophy  [] Active mechanical locking with joint movement  [] Popliteal fullness  [] Tenderness medial joint line  [x] Tenderness lateral joint line  [] Tenderness of tibial plateau   [] Tenderness of patellar tendon  [] Tenderness of quadriceps tendon  [] Tenderness of prepatellar  [] Tenderness of infrapatellar  [] Tenderness of anserine bursae  [x] Tenderness of patellar facet  [] Tenderness over lateral retinaculum   ROM Active Flexion / Extension (0-140)  Left 2 / 106 w/ pain Right 1 / 112 w/ pain   Strength Flexion / Extension (5 / 5)  Left 4 / 5 Right 4 / 5     Special Testing:         Not  Tested IT Band Syndrome L+ L-- R+ R--    [] Iris's Test [] [x] [] [x]     Joint Effusion        [] Ballotable Effusion [] [x] [] [x]    [] Fluid Wave [] [x] [] [x]     Patellar Testing        [] Apprehension [] [x] [] [x]     Meniscal Injury        [] Boy's Test [x] [] [x] []    [x] Thessaly's Test [] [] [] []     Ligament Injury        [] ACL Anterior Drawer [] [x] [] [x]    [] PCL Posterior Drawer [] [x] [] [x]    [] LCL Varus Test [] [x] [] [x]    [] MCL Valgus Test [] [x] [] [x]      Hip Exam normal  Ankle Exam normal  Neurovascular: Intact to light touch  Neuro/ Psych: Awake, alert, oriented, normal mood and affect  Lymphatic: No  LAD  Assessment:   IMAGING:  XR noted in EMR dated 9/27/23  4 views of bilateral knee reviewed and independently interpreted by me with noted no acute findings noted, findings suggestive of arthritic changes, significant lateral joint space narrowing.  Awaiting radiologist findings.  Findings discussed with patient today.    MRI/CT: none    LABS:   Hemoglobin A1c   Date Value Ref Range Status   04/21/2023 5.1 <=7.0 % Final   02/09/2022 5.2 <=7.0    07/07/2021 5.1 <<=7.0 % Final     EMR REVIEW: completed with noted [facility] visit 9/27/23 reviewed  Diagnosis & Treatment Plan:   DIAGNOSIS: Moderate exacerbation of chronic Bilateral R = L Severe knee arthritis Kellgren-Grady Grade 4  1. Bilateral primary osteoarthritis of knee    2. BMI 35.0-35.9,adult      TREATMENT PLAN:     Treatment plan:  History of failed conservative treatments including: RX topical NSAID , HEP with TheraBand > 6 weeks, formal RX PT, and VS injections.  Today recommending HEP with TheraBand > 6 weeks, CSI, and DME RX for rollator for fall prevention. .   Ongoing education about DX, treatment recommendations and reasonable expectations including goal to decrease pain and increase function with conservative treatments including, but limited to:  activity modification as needed, daily HEP with TheraBand, BMI reduction goal 5-10% of body weight, non-impact muscle strengthening with use of stationary bike (RPM set at 80 or > with slow progression to goal of 40 minutes 3-4 times per week as tolerated), walking-aides as needed, adequate vit D/C, glucosamine 1500 mg/day and/or daily acetaminophen 1000 mg 3 times/ day if able to tolerate.  Patient aware condition has no cure and treatment plan is focused on management of symptoms.  Procedure: CSI v. VS injections discussed, s/t failed conservative treatments patient consents to CSI today  RX Medications: continue medications as RX per PCP.  RTC:  3 months  NOTE: Conversation had with patient discussing  surgical versus conservative treatment options.  At this time patient declines referral to surgeon for further eval. and surgical treatment options.  If status changes patient will up date me.       Lili CELESTIN  [facility]  Procedure Note:   Large Joint Aspiration/Injection: bilateral knee    Date/Time: 6/17/2024 10:00 AM    Performed by: Lili Lynn NP  Authorized by: Lili Lynn NP    Indications:  Arthritis and pain  Location:  Knee  Laterality:  Bilateral  Site:  Bilateral knee  Medications (Right):  5 mL LIDOCAINE 1% (PF) 50 mg / 5 mL; 40 mg KENALOG 40 mg / 1 mL  Medications (Left):  5 mL LIDOCAINE 1% (PF) 50 mg / 5 mL; 40 mg KENALOG 40 mg / 1 mL  Ortho Procedure Note Bilateral  Knee lateral suprapatellar approach CSI  Indications: Therapeutic Indication - Decrease pain, Increase range of motion and improve quality of life.  Risks:  Possible complications with the injection include bleeding, infection (.01%), tendon rupture, steroid flare, fat pad or soft tissue atrophy, skin depigmentation, allergic reaction to medications and vasovagal response. (steroid flare treatment is rest, ice, NSAIDs and resolves in 24-36 hours)  Consent:  Procedure, risks, benefits, and alternatives explained the patient, who voiced understanding and agreed to proceed with procedure.  Consent signed and scanned into the medical record.   No absolute contraindications (cellulitis overlying joint, infection, lack of informed consent, allergy to injection medication, AVN protein or egg allergy for sodium hyaluronate, or history of steroid flare) or relative contraindications (uncontrolled DM2 A1c>10, coagulopathy, INR > 3.5, previous joint replacement or history of AVN).        Staff: Lili CELESTIN  Description:  Time-out performed.  The patient was prepped in normal sterile fashion use of chlorhexidine scrub and the appropriate and anatomic landmarks were  identified.  Sterile 25g 1.5 inch needle was used. Topical ethyl chloride was applied.   Contents of syringe included: 5 ml 1% lidocaine (PF) with 40 mg Kenalog  Drainage: n/a  Complications: None   EBL: None   Post Procedure: Patient alert, and moving all extremities. ROM improved, pain decreased.  Good peripheral pulses, no signs of vascular compromise and range of motion intact.  Aftercare instructions were given to patient at time of discharge.  Relative rest for 3 days-avoiding excess activity.  Place ice on the area for 15 minutes every 4-6 hours. Patient may take Tylenol a 1000 mg b.i.d. or ibuprofen 600 mg t.i.d. for the next 3-4 days if not on medication already and safe to take pending co-morbidities.  Protect the area for the next 1-8 hours if anesthetic was used.  Avoid excessive activity for the next 3-4 weeks.  ER precautions given for fever, severe joint pain or allergic reaction or other new symptoms related to the joint injection.      Time Based Billing   Total Time Spent with Patient: 40 minutes Excludes Time Spent Performing Procedure  Visit Start Time: 0915  10 minutes spent prior to visit reviewing EMR, prior labs and x-rays  20 minutes spent in visit with patient face-to-face time completing exam, obtaining history, educating on DX and treatment plan.  10 minutes spent after visit completing EMR documentation.   Visit End Time: 0955     *Please be aware that this note has been generated with the assistance of [email or web address] voice-to-text.  Please excuse any spelling or grammatical errors. Positive findings indicted by checkmark*

## 2024-07-05 ENCOUNTER — OFFICE VISIT (OUTPATIENT)
Dept: BEHAVIORAL HEALTH | Facility: CLINIC | Age: 69
End: 2024-07-05
Payer: MEDICARE

## 2024-07-05 VITALS
DIASTOLIC BLOOD PRESSURE: 82 MMHG | HEIGHT: 69 IN | TEMPERATURE: 98 F | SYSTOLIC BLOOD PRESSURE: 125 MMHG | HEART RATE: 86 BPM | BODY MASS INDEX: 34.94 KG/M2 | WEIGHT: 235.88 LBS

## 2024-07-05 DIAGNOSIS — F34.1 PERSISTENT DEPRESSIVE DISORDER: Primary | ICD-10-CM

## 2024-07-05 DIAGNOSIS — F43.21 GRIEF: Chronic | ICD-10-CM

## 2024-07-05 PROCEDURE — 99213 OFFICE O/P EST LOW 20 MIN: CPT | Mod: PBBFAC,PN | Performed by: NURSE PRACTITIONER

## 2024-07-05 RX ORDER — FLUOXETINE HYDROCHLORIDE 20 MG/1
20 CAPSULE ORAL DAILY
Qty: 30 CAPSULE | Refills: 3 | Status: SHIPPED | OUTPATIENT
Start: 2024-07-05
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
## 2024-07-05 NOTE — PATIENT INSTRUCTIONS
Plan:  1. Decrease Prozac to 40mg a day and then to 20mg q day  2. Exercise daily - walking  3. FU in 3 months

## 2024-07-09 ENCOUNTER — HOSPITAL ENCOUNTER (OUTPATIENT)
Dept: RADIOLOGY | Facility: HOSPITAL | Age: 69
Discharge: HOME | End: 2024-07-09
Attending: FAMILY MEDICINE
Payer: MEDICARE

## 2024-07-09 DIAGNOSIS — Z78.0 POSTMENOPAUSAL: ICD-10-CM

## 2024-07-09 PROCEDURE — 77080 DXA BONE DENSITY AXIAL: CPT | Mod: TC

## 2024-07-11 ENCOUNTER — TELEPHONE (OUTPATIENT)
Dept: FAMILY MEDICINE | Facility: CLINIC | Age: 69
End: 2024-07-11
Payer: MEDICARE

## 2024-07-11 NOTE — TELEPHONE ENCOUNTER
Called patient via phone call and patient understands results given. No further questions at this time.     ----- Message from Berta Lopez MD sent at 7/10/2024  4:16 PM CDT -----  Normal DEXA scan/bone density scan.  Continue with vitamin-D over-the-counter.

## 2024-08-14 DIAGNOSIS — F34.1 PERSISTENT DEPRESSIVE DISORDER: ICD-10-CM

## 2024-08-14 NOTE — TELEPHONE ENCOUNTER
Pharmacy refill request    Please see attached refill request.    Next scheduled office visit: 10/7/2024.    Last office visit: 7/5/2024.     Thank you!

## 2024-08-15 RX ORDER — FLUOXETINE HYDROCHLORIDE 20 MG/1
20 CAPSULE ORAL DAILY
Qty: 30 CAPSULE | Refills: 3 | Status: SHIPPED | OUTPATIENT
Start: 2024-08-15

## 2024-09-17 ENCOUNTER — OFFICE VISIT (OUTPATIENT)
Dept: ORTHOPEDICS | Facility: CLINIC | Age: 69
End: 2024-09-17
Payer: MEDICARE

## 2024-09-17 VITALS
TEMPERATURE: 98 F | DIASTOLIC BLOOD PRESSURE: 69 MMHG | HEART RATE: 84 BPM | WEIGHT: 239.44 LBS | SYSTOLIC BLOOD PRESSURE: 126 MMHG | BODY MASS INDEX: 35.46 KG/M2 | HEIGHT: 69 IN

## 2024-09-17 DIAGNOSIS — M17.0 BILATERAL PRIMARY OSTEOARTHRITIS OF KNEE: Primary | ICD-10-CM

## 2024-09-17 PROCEDURE — 3288F FALL RISK ASSESSMENT DOCD: CPT | Mod: CPTII,,, | Performed by: NURSE PRACTITIONER

## 2024-09-17 PROCEDURE — 1159F MED LIST DOCD IN RCRD: CPT | Mod: CPTII,,, | Performed by: NURSE PRACTITIONER

## 2024-09-17 PROCEDURE — 1125F AMNT PAIN NOTED PAIN PRSNT: CPT | Mod: CPTII,,, | Performed by: NURSE PRACTITIONER

## 2024-09-17 PROCEDURE — 3078F DIAST BP <80 MM HG: CPT | Mod: CPTII,,, | Performed by: NURSE PRACTITIONER

## 2024-09-17 PROCEDURE — 99214 OFFICE O/P EST MOD 30 MIN: CPT | Mod: 25,S$PBB,, | Performed by: NURSE PRACTITIONER

## 2024-09-17 PROCEDURE — 1160F RVW MEDS BY RX/DR IN RCRD: CPT | Mod: CPTII,,, | Performed by: NURSE PRACTITIONER

## 2024-09-17 PROCEDURE — 3008F BODY MASS INDEX DOCD: CPT | Mod: CPTII,,, | Performed by: NURSE PRACTITIONER

## 2024-09-17 PROCEDURE — 3074F SYST BP LT 130 MM HG: CPT | Mod: CPTII,,, | Performed by: NURSE PRACTITIONER

## 2024-09-17 PROCEDURE — 20610 DRAIN/INJ JOINT/BURSA W/O US: CPT | Mod: 50,PBBFAC | Performed by: NURSE PRACTITIONER

## 2024-09-17 PROCEDURE — 1101F PT FALLS ASSESS-DOCD LE1/YR: CPT | Mod: CPTII,,, | Performed by: NURSE PRACTITIONER

## 2024-09-17 PROCEDURE — 99214 OFFICE O/P EST MOD 30 MIN: CPT | Mod: PBBFAC,25 | Performed by: NURSE PRACTITIONER

## 2024-09-17 RX ORDER — LIDOCAINE HYDROCHLORIDE 10 MG/ML
5 INJECTION, SOLUTION EPIDURAL; INFILTRATION; INTRACAUDAL; PERINEURAL
Status: COMPLETED | OUTPATIENT
Start: 2024-09-17 | End: 2024-09-17

## 2024-09-17 RX ORDER — TRIAMCINOLONE ACETONIDE 40 MG/ML
40 INJECTION, SUSPENSION INTRA-ARTICULAR; INTRAMUSCULAR
Status: COMPLETED | OUTPATIENT
Start: 2024-09-17 | End: 2024-09-17

## 2024-09-17 RX ADMIN — LIDOCAINE HYDROCHLORIDE 5 ML: 10 INJECTION, SOLUTION EPIDURAL; INFILTRATION; INTRACAUDAL; PERINEURAL at 10:09

## 2024-09-17 RX ADMIN — TRIAMCINOLONE ACETONIDE 40 MG: 40 INJECTION, SUSPENSION INTRA-ARTICULAR; INTRAMUSCULAR at 10:09

## 2024-09-17 NOTE — PROGRESS NOTES
"   [facility]  Subjective:   PATIENT ID: Maira Terry is a 69 y.o. female.   CHIEF COMPLAINT: Knee Pain of the Left Knee (Here With Bilat Knee pain. Pain level 9 . Pt. Says Her knees tend to give out on her ) and Knee Pain of the Right Knee    HPI:  Bilateral L < R knee pain medial, latera, anterior stiffness and aching   Injury/ Surgical HX r/t CC:  no HX of prior significant joint injury   Onset: several years  fluctuates    Modifying Factors: exacerbated by:  increased activity, prolonged sitting, prolonged walking/ standing improved with:  movement in less than 30 minutes , rest   Associated Symptoms:  [] joint locking  [] joint catching  [x] decreased ROM  [x] crepitus [x] Weakness/ "giving out"  [] falls  [] swelling  [x] difficult sleeping s/t pain        Activity: sedentary with light activity and pain moderately interferes with ADLs, assistive device cane   Previous Treatments:  [x] HEP > 6 weeks  [x] RX PT 8 wks/ 3xs per week, completed 2023  [] Off-loading brace  [x] Soft knee splint [x] OTC Pain Relievers: Tylenol, ibuprofen  [x] RX Medications: topical diclofenac  [x] CSI since 2024, last injection 6/17/24  [x] Hyaluronic acid injections since 2023, last injection 10/12/23 (preferred CSI)   PMH:  non-smoker, obesity, HTN, and DM    Family History: + OA   NOTE:  Follow up, seen by me since 2024  for bilateral knee DJD.  Conservative treatments providing adequate relief at this time and is not interested in surgical treatment options at this time.  CSI given 6/17/24 with adequate relief, lasting 2-3 months.  Symptoms returning recently over past few weeks and are affecting ADLs.  Requesting CSI today for symptom relief.   Patient continued with weakness of b/l LE with knees giving out.  Uses cane PRN, rollator DME RX given at prior visit but unable to obtain to date.   Employment HX: teacher, currently retired.     Current Outpatient " "Medications:     amLODIPine (NORVASC) 10 MG tablet, Take 1 tablet (10 mg total) by mouth once daily., Disp: 90 tablet, Rfl: 1    cetirizine (ZYRTEC) 10 MG tablet, Take 1 tablet (10 mg total) by mouth once daily., Disp: 90 tablet, Rfl: 0    FLUoxetine 20 MG capsule, Take 1 capsule (20 mg total) by mouth once daily., Disp: 30 capsule, Rfl: 3    fluticasone propionate (FLONASE) 50 mcg/actuation nasal spray, 1 spray (50 mcg total) by Each Nostril route once daily., Disp: 16 g, Rfl: 0    spironolactone (ALDACTONE) 50 MG tablet, Take 1 tablet (50 mg total) by mouth 2 (two) times daily., Disp: 180 tablet, Rfl: 1    diclofenac sodium (VOLTAREN) 1 % Gel, Apply 2 g topically 4 (four) times daily as needed (pain). Do not exceed 32 grams/day: do not to exceed 8 grams/day/single joint of upper extremities; do not to exceed 16 grams/day/single joint of lower extremities.  Please request refill of this medication from your PCP., Disp: 100 g, Rfl: 3    vitamin D (VITAMIN D3) 1000 units Tab, Take 1,000 Units by mouth once daily. (Patient not taking: Reported on 9/17/2024), Disp: , Rfl:     Current Facility-Administered Medications:     LIDOcaine (PF) 10 mg/ml (1%) injection 50 mg, 5 mL, Other, 1 time in Clinic/HOD,     LIDOcaine (PF) 10 mg/ml (1%) injection 50 mg, 5 mL, Other, 1 time in Clinic/HOD,     triamcinolone acetonide injection 40 mg, 40 mg, Intra-articular, 1 time in Clinic/HOD,     triamcinolone acetonide injection 40 mg, 40 mg, Intra-articular, 1 time in Clinic/HOD,   Review of patient's allergies indicates:   Allergen Reactions    Hydrocodone-acetaminophen Nausea And Vomiting     REVIEW OF SYSTEMS:  A ten-point review of systems was performed and is negative, except as mentioned above   Objective:   Body mass index is 35.36 kg/m².   Vitals:    09/17/24 0952 09/17/24 0953   BP: 126/69    Pulse: 84    Temp: 97.7 °F (36.5 °C)    TempSrc: Oral    Weight: 108.6 kg (239 lb 6.7 oz)    Height: 5' 9" (1.753 m)    PainSc:    9 "     MSK Knee Exam  General:  no apparent distress, no pain indicators,  obesity  Inspection: lower extremities in proportion with overall body habitus, no erythema   ,  no erythema, limping gait w/ full weight partial (cane use in clinic today)  LEFT KNEE RIGHT KNEE   [x] Swelling mild  [x] Varus deformity  [] Rash [] Contusion  [] Mass  [] Scars [x] Swelling mild  [x] Varus deformity  [] Rash [] Contusion  [] Mass  [] Scars   Palpation:     LEFT KNEE RIGHT KNEE   Joint Warmth: normal  POMT: lateral joint line    [x] Patellar grind with compression  [x] Crepitus  [] Joint effusion  [x] Quad atrophy  [] Active mechanical locking with joint movement  [] Popliteal fullness  [] Tenderness medial joint line  [x] Tenderness lateral joint line  [] Tenderness of tibial plateau   [] Tenderness of patellar tendon  [] Tenderness of quadriceps tendon  [] Tenderness of prepatellar   [] Tenderness of infrapatellar  [] Tenderness of anserine bursae  [x] Tenderness of patellar facet  [] Tenderness over lateral retinaculum Joint Warmth: normal  POMT: lateral joint line  [x] Patellar grind with compression  [x] Crepitus  [] Joint effusion  [x] Quad atrophy  [] Active mechanical locking with joint movement  [] Popliteal fullness  [] Tenderness medial joint line  [x] Tenderness lateral joint line  [] Tenderness of tibial plateau   [] Tenderness of patellar tendon  [] Tenderness of quadriceps tendon  [] Tenderness of prepatellar  [] Tenderness of infrapatellar  [] Tenderness of anserine bursae  [x] Tenderness of patellar facet  [] Tenderness over lateral retinaculum   ROM Active Flexion / Extension (0-140)  Left 2 / 111 w/ pain Right 1 / 115 w/ pain   Strength Flexion / Extension (5 / 5)  Left 4 / 5 Right 4 / 5     Special Testing:         Not  Tested IT Band Syndrome L+ L-- R+ R--    [] Iris's Test [] [x] [] [x]     Joint Effusion        [] Ballotable Effusion [] [x] [] [x]    [] Fluid Wave [] [x] [] [x]     Patellar Testing        []  Apprehension [] [x] [] [x]     Meniscal Injury        [] Boy's Test [x] [] [x] []    [x] Thessaly's Test [] [] [] []     Ligament Injury        [] ACL Anterior Drawer [] [x] [] [x]    [] PCL Posterior Drawer [] [x] [] [x]    [] LCL Varus Test [] [x] [] [x]    [] MCL Valgus Test [] [x] [] [x]      Hip Exam normal  Ankle Exam normal  Neurovascular: Intact to light touch  Neuro/ Psych: Awake, alert, oriented, normal mood and affect  Lymphatic: No LAD  Assessment:   IMAGING:  XR noted in EMR dated 9/27/23  4 views of bilateral knee reviewed and independently interpreted by me with noted no acute findings noted, findings suggestive of arthritic changes, significant lateral joint space narrowing.  Radiologist findings reviewed.  Findings discussed with patient today.    EXAMINATION:  XR KNEE COMP 4 OR MORE VIEWS LEFT 9/27/23  CLINICAL HISTORY:  Bilateral primary osteoarthritis of knee  COMPARISON:  None.  FINDINGS:  No acute displaced fractures or dislocations.  There is narrowing of the medial compartment of the knee joint with some degenerative changes of the lateral compartment and degenerative changes of the patellofemoral joint  The patella is high riding which may indicate the presence of a patella Kathy  Soft tissues within normal limits.  Impression:  Degenerative changes.  Changes suggestive of patella [place].    EXAMINATION: XR KNEE COMP 4 OR MORE VIEWS RIGHT 9/27/23  CLINICAL HISTORY:  Bilateral primary osteoarthritis of knee  COMPARISON:  None.  FINDINGS:  No acute displaced fractures or dislocations.  There is narrowing of the lateral compartment of the knee joint with some sclerosis and marginal osteophytosis some degenerative changes are seen of the medial compartment articular spaces otherwise preserved there is high right ends of the patella which might suggest the presence of a patella Kathy.  On the standing projection there are significant degenerative changes of the lateral compartment of the  contralateral knee  No blastic or lytic lesions.  Soft tissues within normal limits.  Impression:  Degenerative changes.  Changes suggestive of patella [place].    MRI/CT: none    LABS:   Hemoglobin A1c   Date Value Ref Range Status   04/21/2023 5.1 <=7.0 % Final   02/09/2022 5.2 <=7.0    07/07/2021 5.1 <<=7.0 % Final     EMR REVIEW: completed with noted prior visit 6/17/24 reviewed.  Diagnosis & Treatment Plan:   DIAGNOSIS: Moderate exacerbation of chronic Bilateral R = L Severe knee arthritis Kellgren-Grady Grade 4  1. Bilateral primary osteoarthritis of knee    2. BMI 35.0-35.9,adult        TREATMENT PLAN:  Orders Placed This Encounter    triamcinolone acetonide injection 40 mg    triamcinolone acetonide injection 40 mg    LIDOcaine (PF) 10 mg/ml (1%) injection 50 mg    LIDOcaine (PF) 10 mg/ml (1%) injection 50 mg     Treatment plan:  Having adequate relief with current treatment plan. Intra-articular injections recommended today due to return of symptoms since prior visit which are impacting ADLs.  RX PT  with controlled, progressive tendon loading and gradual resumption of activity.  instructed to contact insurance provider in order to obtain provider which takes patient's insurance  Patient verbalized understanding, agrees to plan and denies further questions.   Ongoing education about DX, treatment recommendations and reasonable expectations including goal to decrease pain and increase function with conservative treatments including, but limited to:  activity modification as needed, daily HEP with TheraBand, BMI reduction goal 5-10% of body weight, non-impact muscle strengthening with use of stationary bike (RPM set at 80 or > with slow progression to goal of 40 minutes 3-4 times per week as tolerated), walking-aides as needed, adequate vit D/C, glucosamine 1500 mg/day and/or daily acetaminophen 1000 mg 3 times/ day if able to tolerate.  Patient aware condition has no cure and treatment plan is focused on  management of symptoms.  Procedure: CSI v. VS injections discussed, s/t failed conservative treatments patient consents to CSI today  RX Medications: continue medications as RX per PCP.  RTC:  3 months  NOTE: Conversation had with patient discussing surgical versus conservative treatment options.  At this time patient declines referral to surgeon for further eval. and surgical treatment options.  If status changes patient will up date me.       Lili GUOP [facility]  Procedure Note:   Large Joint Aspiration/Injection: bilateral knee    Date/Time: 9/17/2024 10:00 AM    Performed by: Lili Lynn NP  Authorized by: Lili Lynn NP    Indications:  Arthritis and pain  Location:  Knee  Laterality:  Bilateral  Site:  Bilateral knee  Medications (Right):  5 mL LIDOCAINE 1% (PF) 50 mg / 5 mL; 40 mg KENALOG 40 mg / 1 mL  Medications (Left):  5 mL LIDOCAINE 1% (PF) 50 mg / 5 mL; 40 mg KENALOG 40 mg / 1 mL  Ortho Procedure Note Bilateral  Knee lateral suprapatellar approach CSI  Indications: Therapeutic Indication - Decrease pain, Increase range of motion and improve quality of life.  Risks:  Possible complications with the injection include bleeding, infection (.01%), tendon rupture, steroid flare, fat pad or soft tissue atrophy, skin depigmentation, allergic reaction to medications and vasovagal response. (steroid flare treatment is rest, ice, NSAIDs and resolves in 24-36 hours)  Consent:  Procedure, risks, benefits, and alternatives explained the patient, who voiced understanding and agreed to proceed with procedure.  Consent signed and scanned into the medical record.   No absolute contraindications (cellulitis overlying joint, infection, lack of informed consent, allergy to injection medication, AVN protein or egg allergy for sodium hyaluronate, or history of steroid flare) or relative contraindications (uncontrolled DM2 A1c>10, coagulopathy, INR > 3.5,  previous joint replacement or history of AVN).        Staff: Lili Lynn FNP  Description:  Time-out performed.  The patient was prepped in normal sterile fashion use of chlorhexidine scrub and the appropriate and anatomic landmarks were identified.  Sterile 25g 1.5 inch needle was used. Topical ethyl chloride was applied.   Contents of syringe included: 5 ml 1% lidocaine (PF) with 40 mg Kenalog  Drainage: n/a  Complications: None   EBL: None   Post Procedure: Patient alert, and moving all extremities. ROM improved, pain decreased.  Good peripheral pulses, no signs of vascular compromise and range of motion intact.  Aftercare instructions were given to patient at time of discharge.  Relative rest for 3 days-avoiding excess activity.  Place ice on the area for 15 minutes every 4-6 hours. Patient may take Tylenol a 1000 mg b.i.d. or ibuprofen 600 mg t.i.d. for the next 3-4 days if not on medication already and safe to take pending co-morbidities.  Protect the area for the next 1-8 hours if anesthetic was used.  Avoid excessive activity for the next 3-4 weeks.  ER precautions given for fever, severe joint pain or allergic reaction or other new symptoms related to the joint injection.      Time Based Billing   None    *Please be aware that this note has been generated with the assistance of MModal voice-to-text.  Please excuse any spelling or grammatical errors. Positive findings indicted by checkmark*

## 2024-10-10 ENCOUNTER — OFFICE VISIT (OUTPATIENT)
Dept: URGENT CARE | Facility: CLINIC | Age: 69
End: 2024-10-10
Payer: MEDICARE

## 2024-10-10 VITALS
TEMPERATURE: 98 F | HEIGHT: 69 IN | OXYGEN SATURATION: 99 % | HEART RATE: 79 BPM | WEIGHT: 240.38 LBS | SYSTOLIC BLOOD PRESSURE: 123 MMHG | DIASTOLIC BLOOD PRESSURE: 84 MMHG | RESPIRATION RATE: 18 BRPM | BODY MASS INDEX: 35.6 KG/M2

## 2024-10-10 DIAGNOSIS — R82.90 URINE ABNORMALITY: ICD-10-CM

## 2024-10-10 DIAGNOSIS — R09.89 SYMPTOMS OF UPPER RESPIRATORY INFECTION (URI): ICD-10-CM

## 2024-10-10 DIAGNOSIS — U07.1 COVID-19 VIRUS DETECTED: ICD-10-CM

## 2024-10-10 DIAGNOSIS — U07.1 COVID-19: Primary | ICD-10-CM

## 2024-10-10 LAB
BILIRUB UR QL STRIP: NEGATIVE
CTP QC/QA: YES
CTP QC/QA: YES
GLUCOSE UR QL STRIP: NEGATIVE
KETONES UR QL STRIP: POSITIVE
LEUKOCYTE ESTERASE UR QL STRIP: NEGATIVE
PH, POC UA: 6
POC BLOOD, URINE: NEGATIVE
POC MOLECULAR INFLUENZA A AGN: NEGATIVE
POC MOLECULAR INFLUENZA B AGN: NEGATIVE
POC NITRATES, URINE: NEGATIVE
PROT UR QL STRIP: POSITIVE
SARS-COV-2 AG RESP QL IA.RAPID: POSITIVE
SP GR UR STRIP: 1.02 (ref 1–1.03)
UROBILINOGEN UR STRIP-ACNC: ABNORMAL (ref 0.1–1.1)

## 2024-10-10 PROCEDURE — 81003 URINALYSIS AUTO W/O SCOPE: CPT | Mod: PBBFAC | Performed by: STUDENT IN AN ORGANIZED HEALTH CARE EDUCATION/TRAINING PROGRAM

## 2024-10-10 PROCEDURE — 87811 SARS-COV-2 COVID19 W/OPTIC: CPT | Mod: PBBFAC | Performed by: STUDENT IN AN ORGANIZED HEALTH CARE EDUCATION/TRAINING PROGRAM

## 2024-10-10 PROCEDURE — 87502 INFLUENZA DNA AMP PROBE: CPT | Mod: PBBFAC | Performed by: STUDENT IN AN ORGANIZED HEALTH CARE EDUCATION/TRAINING PROGRAM

## 2024-10-10 PROCEDURE — 99213 OFFICE O/P EST LOW 20 MIN: CPT | Mod: PBBFAC | Performed by: STUDENT IN AN ORGANIZED HEALTH CARE EDUCATION/TRAINING PROGRAM

## 2024-10-10 PROCEDURE — 99213 OFFICE O/P EST LOW 20 MIN: CPT | Mod: S$PBB,,, | Performed by: STUDENT IN AN ORGANIZED HEALTH CARE EDUCATION/TRAINING PROGRAM

## 2024-10-10 NOTE — PROGRESS NOTES
"Subjective:      Patient ID: Maira Terry is a 69 y.o. female.    Vitals:  height is 5' 9" (1.753 m) and weight is 109 kg (240 lb 6.4 oz). Her temperature is 98.2 °F (36.8 °C). Her blood pressure is 123/84 and her pulse is 79. Her respiration is 18 and oxygen saturation is 99%.     Chief Complaint: URI (HA, chills, cough, congestion since yesterday. Also requesting UA.)    ANA Terry is a 69-year-old female presenting to the urgent care clinic today with URI symptoms that has been going on since yesterday.  Patient is complaining of headache, congestion, rhinorrhea, intermittent coughing, chills.  Patient denies any fever.  Patient denies any chest pain or any shortness of breath.  Patient denies any wheezing.  Patient denies any GI or  symptoms.  ROS   Objective:     Physical Exam   Constitutional: She is oriented to person, place, and time. She appears well-developed. She is cooperative.  Non-toxic appearance. She does not appear ill. No distress.   HENT:   Head: Normocephalic and atraumatic.   Ears:   Right Ear: Hearing, tympanic membrane, external ear and ear canal normal.   Left Ear: Hearing, tympanic membrane, external ear and ear canal normal.   Nose: Congestion present. No mucosal edema, rhinorrhea or nasal deformity. No epistaxis. Right sinus exhibits no maxillary sinus tenderness and no frontal sinus tenderness. Left sinus exhibits no maxillary sinus tenderness and no frontal sinus tenderness.   Mouth/Throat: Uvula is midline, oropharynx is clear and moist and mucous membranes are normal. No trismus in the jaw. Normal dentition. No uvula swelling. No oropharyngeal exudate, posterior oropharyngeal edema or posterior oropharyngeal erythema.   Eyes: Conjunctivae and lids are normal. No scleral icterus.   Neck: Trachea normal and phonation normal. Neck supple. No edema present. No erythema present. No neck rigidity present.   Cardiovascular: Normal rate, regular rhythm, normal heart " sounds and normal pulses.   Pulmonary/Chest: Effort normal and breath sounds normal. No respiratory distress. She has no decreased breath sounds. She has no rhonchi.   Abdominal: Normal appearance.   Musculoskeletal: Normal range of motion.         General: No deformity. Normal range of motion.   Neurological: She is alert and oriented to person, place, and time. She exhibits normal muscle tone. Coordination normal.   Skin: Skin is warm, dry, intact, not diaphoretic and not pale.   Psychiatric: Her speech is normal and behavior is normal. Judgment and thought content normal.   Nursing note and vitals reviewed.      Assessment:     1. COVID-19    2. Urine abnormality    3. Symptoms of upper respiratory infection (URI)        Plan:     Patient presents to the urgent care clinic today with URI symptoms.  Rapid testing done in the office shows that she has COVID-19.  We will treat patient symptomatically with cold and flu medication.    COVID-19  -     SARS Coronavirus 2 Antigen, POCT Manual Read  -     phenylephrine-DM-acetaminophen 5- mg Tab; Take 2 each by mouth 3 (three) times daily as needed (cough,congestion,body aches).  Dispense: 30 each; Refill: 0    Urine abnormality  -     POCT Urinalysis, Dipstick, Automated, W/O Scope    Symptoms of upper respiratory infection (URI)  -     SARS Coronavirus 2 Antigen, POCT Manual Read  -     POCT Influenza A/B Molecular      This note is dictated using the M*Modal Fluency Direct word recognition program. There are word recognition mistakes that are occasionally missed on review.    Angelo Lopez MD

## 2024-10-15 DIAGNOSIS — I10 PRIMARY HYPERTENSION: ICD-10-CM

## 2024-10-18 ENCOUNTER — LAB VISIT (OUTPATIENT)
Dept: LAB | Facility: HOSPITAL | Age: 69
End: 2024-10-18
Attending: FAMILY MEDICINE
Payer: MEDICARE

## 2024-10-18 DIAGNOSIS — R73.03 PREDIABETES: ICD-10-CM

## 2024-10-18 DIAGNOSIS — I10 PRIMARY HYPERTENSION: ICD-10-CM

## 2024-10-18 DIAGNOSIS — E83.52 HYPERCALCEMIA: ICD-10-CM

## 2024-10-18 LAB
ALBUMIN SERPL-MCNC: 3.6 G/DL (ref 3.4–4.8)
ALBUMIN/GLOB SERPL: 0.9 RATIO (ref 1.1–2)
ALP SERPL-CCNC: 122 UNIT/L (ref 40–150)
ALT SERPL-CCNC: 13 UNIT/L (ref 0–55)
ANION GAP SERPL CALC-SCNC: 10 MEQ/L
AST SERPL-CCNC: 16 UNIT/L (ref 5–34)
BASOPHILS # BLD AUTO: 0.05 X10(3)/MCL
BASOPHILS NFR BLD AUTO: 0.5 %
BILIRUB SERPL-MCNC: 0.5 MG/DL
BUN SERPL-MCNC: 14 MG/DL (ref 9.8–20.1)
CALCIUM SERPL-MCNC: 10.9 MG/DL (ref 8.4–10.2)
CHLORIDE SERPL-SCNC: 106 MMOL/L (ref 98–107)
CHOLEST SERPL-MCNC: 229 MG/DL
CHOLEST/HDLC SERPL: 3 {RATIO} (ref 0–5)
CO2 SERPL-SCNC: 26 MMOL/L (ref 23–31)
CREAT SERPL-MCNC: 1.11 MG/DL (ref 0.55–1.02)
CREAT/UREA NIT SERPL: 13
EOSINOPHIL # BLD AUTO: 0.19 X10(3)/MCL (ref 0–0.9)
EOSINOPHIL NFR BLD AUTO: 2.1 %
ERYTHROCYTE [DISTWIDTH] IN BLOOD BY AUTOMATED COUNT: 11.7 % (ref 11.5–17)
EST. AVERAGE GLUCOSE BLD GHB EST-MCNC: 96.8 MG/DL
GFR SERPLBLD CREATININE-BSD FMLA CKD-EPI: 54 ML/MIN/1.73/M2
GLOBULIN SER-MCNC: 4.1 GM/DL (ref 2.4–3.5)
GLUCOSE SERPL-MCNC: 92 MG/DL (ref 82–115)
HBA1C MFR BLD: 5 %
HCT VFR BLD AUTO: 44.1 % (ref 37–47)
HDLC SERPL-MCNC: 66 MG/DL (ref 35–60)
HGB BLD-MCNC: 14.5 G/DL (ref 12–16)
IMM GRANULOCYTES # BLD AUTO: 0.04 X10(3)/MCL (ref 0–0.04)
IMM GRANULOCYTES NFR BLD AUTO: 0.4 %
LDLC SERPL CALC-MCNC: 150 MG/DL (ref 50–140)
LYMPHOCYTES # BLD AUTO: 2.56 X10(3)/MCL (ref 0.6–4.6)
LYMPHOCYTES NFR BLD AUTO: 28.1 %
MCH RBC QN AUTO: 31.6 PG (ref 27–31)
MCHC RBC AUTO-ENTMCNC: 32.9 G/DL (ref 33–36)
MCV RBC AUTO: 96.1 FL (ref 80–94)
MONOCYTES # BLD AUTO: 0.58 X10(3)/MCL (ref 0.1–1.3)
MONOCYTES NFR BLD AUTO: 6.4 %
NEUTROPHILS # BLD AUTO: 5.68 X10(3)/MCL (ref 2.1–9.2)
NEUTROPHILS NFR BLD AUTO: 62.5 %
NRBC BLD AUTO-RTO: 0 %
PLATELET # BLD AUTO: 352 X10(3)/MCL (ref 130–400)
PMV BLD AUTO: 9.4 FL (ref 7.4–10.4)
POTASSIUM SERPL-SCNC: 4.2 MMOL/L (ref 3.5–5.1)
PROT SERPL-MCNC: 7.7 GM/DL (ref 5.8–7.6)
RBC # BLD AUTO: 4.59 X10(6)/MCL (ref 4.2–5.4)
SODIUM SERPL-SCNC: 142 MMOL/L (ref 136–145)
TRIGL SERPL-MCNC: 67 MG/DL (ref 37–140)
TSH SERPL-ACNC: 1.25 UIU/ML (ref 0.35–4.94)
VLDLC SERPL CALC-MCNC: 13 MG/DL
WBC # BLD AUTO: 9.1 X10(3)/MCL (ref 4.5–11.5)

## 2024-10-18 PROCEDURE — 80053 COMPREHEN METABOLIC PANEL: CPT

## 2024-10-18 PROCEDURE — 83036 HEMOGLOBIN GLYCOSYLATED A1C: CPT

## 2024-10-18 PROCEDURE — 84443 ASSAY THYROID STIM HORMONE: CPT

## 2024-10-18 PROCEDURE — 36415 COLL VENOUS BLD VENIPUNCTURE: CPT

## 2024-10-18 PROCEDURE — 82330 ASSAY OF CALCIUM: CPT

## 2024-10-18 PROCEDURE — 80061 LIPID PANEL: CPT

## 2024-10-18 PROCEDURE — 85025 COMPLETE CBC W/AUTO DIFF WBC: CPT

## 2024-10-18 RX ORDER — AMLODIPINE BESYLATE 10 MG/1
10 TABLET ORAL
Qty: 90 TABLET | Refills: 1 | Status: SHIPPED | OUTPATIENT
Start: 2024-10-18

## 2024-10-19 LAB — CA-I ADJ PH7.4 SERPL ISE-MCNC: 5.29 MG/DL (ref 4.57–5.43)

## 2024-10-23 ENCOUNTER — OFFICE VISIT (OUTPATIENT)
Dept: FAMILY MEDICINE | Facility: CLINIC | Age: 69
End: 2024-10-23
Payer: MEDICARE

## 2024-10-23 VITALS
DIASTOLIC BLOOD PRESSURE: 80 MMHG | BODY MASS INDEX: 35.7 KG/M2 | TEMPERATURE: 98 F | WEIGHT: 241 LBS | RESPIRATION RATE: 18 BRPM | HEART RATE: 67 BPM | HEIGHT: 69 IN | OXYGEN SATURATION: 99 % | SYSTOLIC BLOOD PRESSURE: 119 MMHG

## 2024-10-23 DIAGNOSIS — E83.52 HYPERCALCEMIA: ICD-10-CM

## 2024-10-23 DIAGNOSIS — Z12.31 ENCOUNTER FOR SCREENING MAMMOGRAM FOR MALIGNANT NEOPLASM OF BREAST: ICD-10-CM

## 2024-10-23 DIAGNOSIS — R09.82 POSTNASAL DRIP: ICD-10-CM

## 2024-10-23 DIAGNOSIS — Z80.0 FAMILY HISTORY OF COLON CANCER: ICD-10-CM

## 2024-10-23 DIAGNOSIS — Z23 IMMUNIZATION DUE: ICD-10-CM

## 2024-10-23 DIAGNOSIS — R79.89 ELEVATED SERUM CREATININE: ICD-10-CM

## 2024-10-23 DIAGNOSIS — I10 PRIMARY HYPERTENSION: Primary | ICD-10-CM

## 2024-10-23 DIAGNOSIS — E78.5 HYPERLIPIDEMIA, UNSPECIFIED HYPERLIPIDEMIA TYPE: ICD-10-CM

## 2024-10-23 DIAGNOSIS — Z86.0100 HISTORY OF COLONIC POLYPS: ICD-10-CM

## 2024-10-23 DIAGNOSIS — Z12.11 SCREEN FOR COLON CANCER: ICD-10-CM

## 2024-10-23 LAB
BACTERIA #/AREA URNS AUTO: ABNORMAL /HPF
BILIRUB UR QL STRIP.AUTO: NEGATIVE
CLARITY UR: ABNORMAL
COLOR UR AUTO: YELLOW
GLUCOSE UR QL STRIP: NORMAL
HGB UR QL STRIP: NEGATIVE
HYALINE CASTS #/AREA URNS LPF: ABNORMAL /LPF
KETONES UR QL STRIP: ABNORMAL
LEUKOCYTE ESTERASE UR QL STRIP: NEGATIVE
MUCOUS THREADS URNS QL MICRO: ABNORMAL /LPF
NITRITE UR QL STRIP: NEGATIVE
PH UR STRIP: 5.5 [PH]
PROT UR QL STRIP: ABNORMAL
RBC #/AREA URNS AUTO: ABNORMAL /HPF
SP GR UR STRIP.AUTO: 1.03 (ref 1–1.03)
SQUAMOUS #/AREA URNS LPF: ABNORMAL /HPF
UROBILINOGEN UR STRIP-ACNC: ABNORMAL
WBC #/AREA URNS AUTO: ABNORMAL /HPF

## 2024-10-23 PROCEDURE — 1160F RVW MEDS BY RX/DR IN RCRD: CPT | Mod: CPTII,,, | Performed by: FAMILY MEDICINE

## 2024-10-23 PROCEDURE — 99214 OFFICE O/P EST MOD 30 MIN: CPT | Mod: S$PBB,,, | Performed by: FAMILY MEDICINE

## 2024-10-23 PROCEDURE — 3044F HG A1C LEVEL LT 7.0%: CPT | Mod: CPTII,,, | Performed by: FAMILY MEDICINE

## 2024-10-23 PROCEDURE — 99215 OFFICE O/P EST HI 40 MIN: CPT | Mod: PBBFAC,25 | Performed by: FAMILY MEDICINE

## 2024-10-23 PROCEDURE — 3074F SYST BP LT 130 MM HG: CPT | Mod: CPTII,,, | Performed by: FAMILY MEDICINE

## 2024-10-23 PROCEDURE — 1159F MED LIST DOCD IN RCRD: CPT | Mod: CPTII,,, | Performed by: FAMILY MEDICINE

## 2024-10-23 PROCEDURE — 3288F FALL RISK ASSESSMENT DOCD: CPT | Mod: CPTII,,, | Performed by: FAMILY MEDICINE

## 2024-10-23 PROCEDURE — 81001 URINALYSIS AUTO W/SCOPE: CPT | Performed by: FAMILY MEDICINE

## 2024-10-23 PROCEDURE — G0008 ADMIN INFLUENZA VIRUS VAC: HCPCS | Mod: PBBFAC

## 2024-10-23 PROCEDURE — 90653 IIV ADJUVANT VACCINE IM: CPT | Mod: PBBFAC

## 2024-10-23 PROCEDURE — 1101F PT FALLS ASSESS-DOCD LE1/YR: CPT | Mod: CPTII,,, | Performed by: FAMILY MEDICINE

## 2024-10-23 PROCEDURE — 3008F BODY MASS INDEX DOCD: CPT | Mod: CPTII,,, | Performed by: FAMILY MEDICINE

## 2024-10-23 PROCEDURE — 3079F DIAST BP 80-89 MM HG: CPT | Mod: CPTII,,, | Performed by: FAMILY MEDICINE

## 2024-10-23 RX ORDER — CETIRIZINE HYDROCHLORIDE 10 MG/1
10 TABLET ORAL DAILY
Qty: 90 TABLET | Refills: 0 | Status: SHIPPED | OUTPATIENT
Start: 2024-10-23

## 2024-10-23 RX ADMIN — INFLUENZA A VIRUS A/VICTORIA/4897/2022 IVR-238 (H1N1) ANTIGEN (FORMALDEHYDE INACTIVATED), INFLUENZA A VIRUS A/THAILAND/8/2022 IVR-237 (H3N2) ANTIGEN (FORMALDEHYDE INACTIVATED), INFLUENZA B VIRUS B/AUSTRIA/1359417/2021 BVR-26 ANTIGEN (FORMALDEHYDE INACTIVATED) 0.5 ML: 15; 15; 15 INJECTION, SUSPENSION INTRAMUSCULAR at 11:10

## 2024-10-23 NOTE — PATIENT INSTRUCTIONS
Stop spironolactone (aldactone) and come back in 2 weeks for nurse visit blood pressure recheck. Continue amlodipine ( Norvasc).

## 2024-10-23 NOTE — PROGRESS NOTES
Patient Name: Maira Terry   : 1955  MRN: [id number]     SUBJECTIVE:  Maira Terry is a 69 y.o. female here for Follow-up  .    HPI  Here for routine follow up of hypertension, prediabetes.  Did labs before visit.  TSH normal, hemoglobin A1c 5%, lipid panel with worsened total cholesterol 229.  CMP with slightly elevated creatinine 1.1 in calcium 10.9.  Likely dehydrated though because she was recently diagnosed with COVID 10/10/2024 and her appetite has been low lately but states that she has been drinking a lot of water/fluids. Feels much better. Cough has resolved.   Regarding hypercalcemia, last year that resolved and ionized calcium was actually normal this time, so will just repeat labs and will add PTH and vitamin-D.  On amlodipine and aldactone for HTN. Well controlled, normal readings at home too. Aldactone indication was for htn only. Has been on it for years from previous PCP.  Of note she has allergy to lisinopril.  Regarding hyperlipidemia, patient could not tolerate Lipitor, but lipid panel was normalized after watching diet closely. But current lipid panel shows high cholesterol.admits eating a bit of fried food.  The 10-year ASCVD risk score (Montserrat DK, et al., 2019) is: 10.4%    Values used to calculate the score:      Age: 69 years      Sex: Female      Is Non- : Yes      Diabetic: No      Tobacco smoker: No      Systolic Blood Pressure: 119 mmHg      Is BP treated: Yes      HDL Cholesterol: 66 mg/dL      Total Cholesterol: 229 mg/dL    Follows with behavioral health regarding depression.  Feels well     Stopped taking vit d, caused constipation.     Due for colonoscopy.  We did refer 6 months ago, but has not heard anything yet.  Will refer again locally.      ALLERGIES:   Review of patient's allergies indicates:   Allergen Reactions    Lisinopril Swelling    Hydrocodone-acetaminophen Nausea And Vomiting         ROS:  Review of Systems   Constitutional:   "Negative for chills and fever.   HENT:  Negative for congestion.    Eyes:  Negative for blurred vision.   Respiratory:  Negative for cough and shortness of breath.    Cardiovascular:  Negative for chest pain, palpitations and leg swelling.   Gastrointestinal:  Negative for abdominal pain, blood in stool, diarrhea, nausea and vomiting.   Genitourinary:  Negative for dysuria and hematuria.   Neurological:  Negative for dizziness and headaches.   Psychiatric/Behavioral:  Negative for depression. The patient is not nervous/anxious.          OBJECTIVE:  Vital signs  Vitals:    10/23/24 1045   BP: 119/80   Pulse: 67   Resp: 18   Temp: 97.7 °F (36.5 °C)   TempSrc: Oral   SpO2: 99%   Weight: 109.3 kg (241 lb)   Height: 5' 9" (1.753 m)      Body mass index is 35.59 kg/m².    PHYSICAL EXAM:   Physical Exam  Vitals reviewed.   Constitutional:       General: She is not in acute distress.     Appearance: Normal appearance. She is not ill-appearing.   HENT:      Head: Normocephalic and atraumatic.      Right Ear: External ear normal.      Left Ear: External ear normal.      Nose: Nose normal. No rhinorrhea.      Mouth/Throat:      Mouth: Mucous membranes are moist.   Eyes:      General: No scleral icterus.        Right eye: No discharge.         Left eye: No discharge.      Conjunctiva/sclera: Conjunctivae normal.      Pupils: Pupils are equal, round, and reactive to light.   Cardiovascular:      Rate and Rhythm: Normal rate and regular rhythm.   Pulmonary:      Effort: Pulmonary effort is normal. No respiratory distress.      Breath sounds: No wheezing, rhonchi or rales.   Abdominal:      General: There is no distension.      Palpations: Abdomen is soft.      Tenderness: There is no abdominal tenderness.   Musculoskeletal:      Cervical back: Normal range of motion and neck supple. No rigidity or tenderness.      Right lower leg: No edema.      Left lower leg: No edema.   Skin:     General: Skin is warm.      Coloration: Skin is " not pale.      Findings: No rash.   Neurological:      General: No focal deficit present.      Mental Status: She is alert and oriented to person, place, and time.   Psychiatric:         Mood and Affect: Mood normal.         Behavior: Behavior normal.          ASSESSMENT/PLAN:  1. Primary hypertension  Well-controlled but will do a trial of stopping spironolactone because of elevated serum creatinine.  Blood pressure also in good numbers 119/80.  Stop spironolactone with close follow up in 2 weeks for blood pressure recheck and advised patient to have labs done that day to observe creatinine and electrolytes.  Continue with amlodipine 10 mg daily.  Watch salt intake.  Depending on results will see if we are going to resume spironolactone or switch to hydralazine.  Will avoid hydrochlorothiazide because of hypercalcemia.  Would not tolerate beta blocker because of the heart rate.    2. Elevated serum creatinine  Advised patient to drink plenty of water, avoid NSAIDs, will discontinue spironolactone and have repeat labs in 2 weeks.  Also check UA  -     Comprehensive Metabolic Panel; Future; Expected date: 10/23/2024  -     Urinalysis, Reflex to Urine Culture    3. Hypercalcemia  Further workup as below  -     Comprehensive Metabolic Panel; Future; Expected date: 10/23/2024  -     Vitamin D; Future; Expected date: 10/23/2024  -     PTH, Intact; Future; Expected date: 10/23/2024    4. Postnasal drip  -     cetirizine (ZYRTEC) 10 MG tablet; Take 1 tablet (10 mg total) by mouth once daily.  Dispense: 90 tablet; Refill: 0    5. Screen for colon cancer  -     Ambulatory referral/consult to Gastroenterology; Future; Expected date: 10/30/2024    6. History of colonic polyps  -     Ambulatory referral/consult to Gastroenterology; Future; Expected date: 10/30/2024    7. Family history of colon cancer  -     Ambulatory referral/consult to Gastroenterology; Future; Expected date: 10/30/2024    8. Immunization due  -      influenza (adjuvanted) (Fluad) 45 mcg/0.5 mL IM vaccine (> or = 66 yo) 0.5 mL    9. Encounter for screening mammogram for malignant neoplasm of breast  -     Mammo Digital Screening Bilat w/ [facility]; Future; Expected date: 11/04/2024    10. Hyperlipidemia, unspecified hyperlipidemia type  Patient would like to watch diet 1st before considering medication.  Did not like Lipitor in the past.             Previous medical history/lab work/radiology reviewed and considered during medical management decisions.   Medication list reviewed and medication reconciliation performed.  Patient was provided  and care about his/her current diagnosis (es) and medications including risk/benefit and side effects/adverse events, over the counter medication uses/doses, home self-care and contact precautions,  and red flags and indications for when to seek immediate medical attention.   Patient was advised to continue compliance with current medication list and medical recommendations.  Recommended/ Advised continued compliance with recommended eating habits/ diets for medical conditions and exercise 150 minutes/ week (if possible) for medical condition (s).        RESULTS:  Recent Results (from the past 6 weeks)   POCT Urinalysis, Dipstick, Automated, W/O Scope    Collection Time: 10/10/24  1:25 PM   Result Value Ref Range    POC Blood, Urine Negative Negative    POC Bilirubin, Urine Negative Negative    POC Urobilinogen, Urine 1.0e.u./dl 0.1 - 1.1    POC Ketones, Urine Positive (A) Negative    POC Protein, Urine Positive (A) Negative    POC Nitrates, Urine Negative Negative    POC Glucose, Urine Negative Negative    pH, UA 6.0     POC Specific Gravity, Urine 1.025 1.003 - 1.029    POC Leukocytes, Urine Negative Negative   SARS Coronavirus 2 Antigen, POCT Manual Read    Collection Time: 10/10/24  1:38 PM   Result Value Ref Range    SARS Coronavirus 2 Antigen Positive (A) Negative     Acceptable Yes    POCT Influenza  A/B Molecular    Collection Time: 10/10/24  1:53 PM   Result Value Ref Range    POC Molecular Influenza A Ag Negative Negative    POC Molecular Influenza B Ag Negative Negative     Acceptable Yes    Calcium, Ionized    Collection Time: 10/18/24 10:53 AM   Result Value Ref Range    Calcium, Ionized, S 5.29 4.57 - 5.43 mg/dL   Comprehensive Metabolic Panel    Collection Time: 10/18/24 10:53 AM   Result Value Ref Range    Sodium 142 136 - 145 mmol/L    Potassium 4.2 3.5 - 5.1 mmol/L    Chloride 106 98 - 107 mmol/L    CO2 26 23 - 31 mmol/L    Glucose 92 82 - 115 mg/dL    Blood Urea Nitrogen 14.0 9.8 - 20.1 mg/dL    Creatinine 1.11 (H) 0.55 - 1.02 mg/dL    Calcium 10.9 (H) 8.4 - 10.2 mg/dL    Protein Total 7.7 (H) 5.8 - 7.6 gm/dL    Albumin 3.6 3.4 - 4.8 g/dL    Globulin 4.1 (H) 2.4 - 3.5 gm/dL    Albumin/Globulin Ratio 0.9 (L) 1.1 - 2.0 ratio    Bilirubin Total 0.5 <=1.5 mg/dL     40 - 150 unit/L    ALT 13 0 - 55 unit/L    AST 16 5 - 34 unit/L    eGFR 54 mL/min/1.73/m2    Anion Gap 10.0 mEq/L    BUN/Creatinine Ratio 13    Lipid Panel    Collection Time: 10/18/24 10:53 AM   Result Value Ref Range    Cholesterol Total 229 (H) <=200 mg/dL    HDL Cholesterol 66 (H) 35 - 60 mg/dL    Triglyceride 67 37 - 140 mg/dL    Cholesterol/HDL Ratio 3 0 - 5    Very Low Density Lipoprotein 13     LDL Cholesterol 150.00 (H) 50.00 - 140.00 mg/dL   Hemoglobin A1C    Collection Time: 10/18/24 10:53 AM   Result Value Ref Range    Hemoglobin A1c 5.0 <=7.0 %    Estimated Average Glucose 96.8 mg/dL   TSH    Collection Time: 10/18/24 10:53 AM   Result Value Ref Range    TSH 1.250 0.350 - 4.940 uIU/mL   CBC with Differential    Collection Time: 10/18/24 10:53 AM   Result Value Ref Range    WBC 9.10 4.50 - 11.50 x10(3)/mcL    RBC 4.59 4.20 - 5.40 x10(6)/mcL    Hgb 14.5 12.0 - 16.0 g/dL    Hct 44.1 37.0 - 47.0 %    MCV 96.1 (H) 80.0 - 94.0 fL    MCH 31.6 (H) 27.0 - 31.0 pg    MCHC 32.9 (L) 33.0 - 36.0 g/dL    RDW 11.7 11.5 -  17.0 %    Platelet 352 130 - 400 x10(3)/mcL    MPV 9.4 7.4 - 10.4 fL    Neut % 62.5 %    Lymph % 28.1 %    Mono % 6.4 %    Eos % 2.1 %    Basophil % 0.5 %    Lymph # 2.56 0.6 - 4.6 x10(3)/mcL    Neut # 5.68 2.1 - 9.2 x10(3)/mcL    Mono # 0.58 0.1 - 1.3 x10(3)/mcL    Eos # 0.19 0 - 0.9 x10(3)/mcL    Baso # 0.05 <=0.2 x10(3)/mcL    IG# 0.04 0 - 0.04 x10(3)/mcL    IG% 0.4 %    NRBC% 0.0 %         Follow Up:  Follow up in about 3 months (around 1/23/2025) for htn.     [unfilled]    This note was created with the assistance of a voice recognition software or phone dictation. There may be transcription errors as a result of using this technology however minimal. Effort has been made to assure accuracy of transcription but any obvious errors or omissions should be clarified with the author of the document

## 2024-11-04 ENCOUNTER — TELEPHONE (OUTPATIENT)
Dept: FAMILY MEDICINE | Facility: CLINIC | Age: 69
End: 2024-11-04
Payer: MEDICARE

## 2024-11-04 NOTE — TELEPHONE ENCOUNTER
The patient states that since she is off her hydrochlorothiazide she has been holding fluid and her back hurts.

## 2024-11-05 ENCOUNTER — LAB VISIT (OUTPATIENT)
Dept: LAB | Facility: HOSPITAL | Age: 69
End: 2024-11-05
Attending: FAMILY MEDICINE
Payer: MEDICARE

## 2024-11-05 DIAGNOSIS — E83.52 HYPERCALCEMIA: ICD-10-CM

## 2024-11-05 DIAGNOSIS — R79.89 ELEVATED SERUM CREATININE: ICD-10-CM

## 2024-11-05 LAB
25(OH)D3+25(OH)D2 SERPL-MCNC: 25 NG/ML (ref 30–80)
ALBUMIN SERPL-MCNC: 3.5 G/DL (ref 3.4–4.8)
ALBUMIN/GLOB SERPL: 1 RATIO (ref 1.1–2)
ALP SERPL-CCNC: 124 UNIT/L (ref 40–150)
ALT SERPL-CCNC: 8 UNIT/L (ref 0–55)
ANION GAP SERPL CALC-SCNC: 7 MEQ/L
AST SERPL-CCNC: 16 UNIT/L (ref 5–34)
BILIRUB SERPL-MCNC: 0.5 MG/DL
BUN SERPL-MCNC: 12.7 MG/DL (ref 9.8–20.1)
CALCIUM SERPL-MCNC: 10.3 MG/DL (ref 8.4–10.2)
CHLORIDE SERPL-SCNC: 109 MMOL/L (ref 98–107)
CO2 SERPL-SCNC: 27 MMOL/L (ref 23–31)
CREAT SERPL-MCNC: 1.1 MG/DL (ref 0.55–1.02)
CREAT/UREA NIT SERPL: 12
GFR SERPLBLD CREATININE-BSD FMLA CKD-EPI: 55 ML/MIN/1.73/M2
GLOBULIN SER-MCNC: 3.6 GM/DL (ref 2.4–3.5)
GLUCOSE SERPL-MCNC: 94 MG/DL (ref 82–115)
POTASSIUM SERPL-SCNC: 3.9 MMOL/L (ref 3.5–5.1)
PROT SERPL-MCNC: 7.1 GM/DL (ref 5.8–7.6)
PTH-INTACT SERPL-MCNC: 139.5 PG/ML (ref 8.7–77)
SODIUM SERPL-SCNC: 143 MMOL/L (ref 136–145)

## 2024-11-05 PROCEDURE — 83970 ASSAY OF PARATHORMONE: CPT

## 2024-11-05 PROCEDURE — 82306 VITAMIN D 25 HYDROXY: CPT

## 2024-11-05 PROCEDURE — 80053 COMPREHEN METABOLIC PANEL: CPT

## 2024-11-05 PROCEDURE — 36415 COLL VENOUS BLD VENIPUNCTURE: CPT

## 2024-11-07 ENCOUNTER — CLINICAL SUPPORT (OUTPATIENT)
Dept: FAMILY MEDICINE | Facility: CLINIC | Age: 69
End: 2024-11-07
Payer: MEDICARE

## 2024-11-07 VITALS — DIASTOLIC BLOOD PRESSURE: 79 MMHG | HEART RATE: 65 BPM | SYSTOLIC BLOOD PRESSURE: 117 MMHG

## 2024-11-07 DIAGNOSIS — I10 PRIMARY HYPERTENSION: Primary | ICD-10-CM

## 2024-11-07 PROCEDURE — 99211 OFF/OP EST MAY X REQ PHY/QHP: CPT | Mod: PBBFAC

## 2024-11-07 NOTE — PROGRESS NOTES
Maira Terry 69 y.o. female is here today for Blood Pressure check.   History of HTN yes.    Review of patient's allergies indicates:   Allergen Reactions    Lisinopril Swelling    Hydrocodone-acetaminophen Nausea And Vomiting     Creatinine   Date Value Ref Range Status   11/05/2024 1.10 (H) 0.55 - 1.02 mg/dL Final     Sodium   Date Value Ref Range Status   11/05/2024 143 136 - 145 mmol/L Final     Potassium   Date Value Ref Range Status   11/05/2024 3.9 3.5 - 5.1 mmol/L Final   ]  Patient verifies taking blood pressure medications on a regular basis at the same time of the day.     Current Outpatient Medications:     amLODIPine (NORVASC) 10 MG tablet, TAKE ONE TABLET BY MOUTH EVERY DAY, Disp: 90 tablet, Rfl: 1    cetirizine (ZYRTEC) 10 MG tablet, Take 1 tablet (10 mg total) by mouth once daily., Disp: 90 tablet, Rfl: 0    diclofenac sodium (VOLTAREN) 1 % Gel, Apply 2 g topically 4 (four) times daily as needed (pain). Do not exceed 32 grams/day: do not to exceed 8 grams/day/single joint of upper extremities; do not to exceed 16 grams/day/single joint of lower extremities.  Please request refill of this medication from your PCP., Disp: 100 g, Rfl: 3    FLUoxetine 20 MG capsule, Take 1 capsule (20 mg total) by mouth once daily., Disp: 30 capsule, Rfl: 3    fluticasone propionate (FLONASE) 50 mcg/actuation nasal spray, 1 spray (50 mcg total) by Each Nostril route once daily. (Patient not taking: Reported on 10/10/2024), Disp: 16 g, Rfl: 0    vitamin D (VITAMIN D3) 1000 units Tab, Take 1,000 Units by mouth once daily. (Patient not taking: Reported on 10/10/2024), Disp: , Rfl:   Does patient have record of home blood pressure readings no. Readings have been averaging n/a.   Last dose of blood pressure medication was taken at 6:30.  Patient is asymptomatic.   Complains of n/a.    117/79  , 65  .    Dr. Sweeney will be notified.

## 2024-11-15 ENCOUNTER — HOSPITAL ENCOUNTER (OUTPATIENT)
Dept: RADIOLOGY | Facility: HOSPITAL | Age: 69
Discharge: HOME | End: 2024-11-15
Attending: FAMILY MEDICINE
Payer: MEDICARE

## 2024-11-15 DIAGNOSIS — Z12.31 ENCOUNTER FOR SCREENING MAMMOGRAM FOR MALIGNANT NEOPLASM OF BREAST: ICD-10-CM

## 2024-11-15 PROCEDURE — 77067 SCR MAMMO BI INCL CAD: CPT | Mod: TC

## 2024-11-15 PROCEDURE — 77063 BREAST TOMOSYNTHESIS BI: CPT | Mod: 26,,, | Performed by: RADIOLOGY

## 2024-11-15 PROCEDURE — 77067 SCR MAMMO BI INCL CAD: CPT | Mod: 26,,, | Performed by: RADIOLOGY

## 2024-12-17 ENCOUNTER — HOSPITAL ENCOUNTER (OUTPATIENT)
Dept: RADIOLOGY | Facility: HOSPITAL | Age: 69
Discharge: HOME | End: 2024-12-17
Attending: NURSE PRACTITIONER
Payer: MEDICARE

## 2024-12-17 ENCOUNTER — OFFICE VISIT (OUTPATIENT)
Dept: ORTHOPEDICS | Facility: CLINIC | Age: 69
End: 2024-12-17
Payer: MEDICARE

## 2024-12-17 VITALS
HEART RATE: 73 BPM | BODY MASS INDEX: 35.62 KG/M2 | TEMPERATURE: 98 F | DIASTOLIC BLOOD PRESSURE: 73 MMHG | SYSTOLIC BLOOD PRESSURE: 117 MMHG | WEIGHT: 240.5 LBS | HEIGHT: 69 IN

## 2024-12-17 DIAGNOSIS — R52 PAIN: ICD-10-CM

## 2024-12-17 DIAGNOSIS — M17.9 OSTEOARTHRITIS OF KNEE, UNSPECIFIED LATERALITY, UNSPECIFIED OSTEOARTHRITIS TYPE: ICD-10-CM

## 2024-12-17 DIAGNOSIS — M17.0 BILATERAL PRIMARY OSTEOARTHRITIS OF KNEE: Primary | ICD-10-CM

## 2024-12-17 PROCEDURE — 3074F SYST BP LT 130 MM HG: CPT | Mod: CPTII,,, | Performed by: NURSE PRACTITIONER

## 2024-12-17 PROCEDURE — 1160F RVW MEDS BY RX/DR IN RCRD: CPT | Mod: CPTII,,, | Performed by: NURSE PRACTITIONER

## 2024-12-17 PROCEDURE — 73564 X-RAY EXAM KNEE 4 OR MORE: CPT | Mod: TC,LT

## 2024-12-17 PROCEDURE — 3044F HG A1C LEVEL LT 7.0%: CPT | Mod: CPTII,,, | Performed by: NURSE PRACTITIONER

## 2024-12-17 PROCEDURE — 3288F FALL RISK ASSESSMENT DOCD: CPT | Mod: CPTII,,, | Performed by: NURSE PRACTITIONER

## 2024-12-17 PROCEDURE — 3078F DIAST BP <80 MM HG: CPT | Mod: CPTII,,, | Performed by: NURSE PRACTITIONER

## 2024-12-17 PROCEDURE — 1101F PT FALLS ASSESS-DOCD LE1/YR: CPT | Mod: CPTII,,, | Performed by: NURSE PRACTITIONER

## 2024-12-17 PROCEDURE — 99214 OFFICE O/P EST MOD 30 MIN: CPT | Mod: 25,S$PBB,, | Performed by: NURSE PRACTITIONER

## 2024-12-17 PROCEDURE — 99214 OFFICE O/P EST MOD 30 MIN: CPT | Mod: PBBFAC,25 | Performed by: NURSE PRACTITIONER

## 2024-12-17 PROCEDURE — 20610 DRAIN/INJ JOINT/BURSA W/O US: CPT | Mod: 50,PBBFAC | Performed by: NURSE PRACTITIONER

## 2024-12-17 PROCEDURE — 73564 X-RAY EXAM KNEE 4 OR MORE: CPT | Mod: TC,RT

## 2024-12-17 PROCEDURE — 3008F BODY MASS INDEX DOCD: CPT | Mod: CPTII,,, | Performed by: NURSE PRACTITIONER

## 2024-12-17 PROCEDURE — 1125F AMNT PAIN NOTED PAIN PRSNT: CPT | Mod: CPTII,,, | Performed by: NURSE PRACTITIONER

## 2024-12-17 PROCEDURE — 1159F MED LIST DOCD IN RCRD: CPT | Mod: CPTII,,, | Performed by: NURSE PRACTITIONER

## 2024-12-17 RX ORDER — DICLOFENAC SODIUM 10 MG/G
2 GEL TOPICAL 4 TIMES DAILY PRN
Qty: 100 G | Refills: 3 | Status: SHIPPED | OUTPATIENT
Start: 2024-12-17 | End: 2025-03-17

## 2024-12-17 RX ORDER — TRIAMCINOLONE ACETONIDE 40 MG/ML
40 INJECTION, SUSPENSION INTRA-ARTICULAR; INTRAMUSCULAR
Status: COMPLETED | OUTPATIENT
Start: 2024-12-17 | End: 2024-12-17

## 2024-12-17 RX ORDER — DICLOFENAC SODIUM 10 MG/G
2 GEL TOPICAL 3 TIMES DAILY
Qty: 100 G | Refills: 2 | Status: SHIPPED | OUTPATIENT
Start: 2024-12-17 | End: 2025-01-16

## 2024-12-17 RX ORDER — LIDOCAINE HYDROCHLORIDE 10 MG/ML
5 INJECTION, SOLUTION EPIDURAL; INFILTRATION; INTRACAUDAL; PERINEURAL
Status: COMPLETED | OUTPATIENT
Start: 2024-12-17 | End: 2024-12-17

## 2024-12-17 RX ADMIN — TRIAMCINOLONE ACETONIDE 40 MG: 40 INJECTION, SUSPENSION INTRA-ARTICULAR; INTRAMUSCULAR at 10:12

## 2024-12-17 RX ADMIN — LIDOCAINE HYDROCHLORIDE 5 ML: 10 INJECTION, SOLUTION EPIDURAL; INFILTRATION; INTRACAUDAL; PERINEURAL at 10:12

## 2024-12-17 NOTE — PROGRESS NOTES
"   [facility]  Subjective:   PATIENT ID: Maira Terry is a 69 y.o. female.   CHIEF COMPLAINT: Knee Pain of the Left Knee (F/u Bilat Knee pain. Pain level 8 ) and Knee Pain of the Right Knee    HPI:  Bilateral L < R knee pain medial, latera, anterior stiffness and aching   Injury/ Surgical HX r/t CC:  no HX of prior significant joint injury   Onset: several years  fluctuates    Modifying Factors: exacerbated by:  increased activity, prolonged sitting, prolonged walking/ standing improved with:  movement in less than 30 minutes , rest   Associated Symptoms:  [] joint locking  [] joint catching  [x] decreased ROM  [x] crepitus [x] Weakness/ "giving out"  [] falls  [] swelling  [x] difficult sleeping s/t pain        Activity: sedentary with light activity and pain moderately interferes with ADLs, assistive device cane   Previous Treatments:  [x] HEP > 6 weeks  [x] RX PT 8 wks/ 3xs per week, completed 2023  [] Off-loading brace  [x] Soft knee splint [x] OTC Pain Relievers: Tylenol, ibuprofen  [x] RX Medications: topical diclofenac  [x] CSI since 2024, last injection 9/17/24  [x] Hyaluronic acid injections since 2023, last injection 10/12/23 (preferred CSI)   PMH:  non-smoker, obesity, HTN, and DM    Family History: + OA   NOTE:  Follow up, seen by me since 2024  for bilateral knee DJD.  Conservative treatments providing adequate relief at this time and is not interested in surgical treatment options at this time.  CSI given 9/17/24 with adequate relief, lasting 2-3 months.  Symptoms returning recently over past few weeks and are affecting ADLs.  Requesting CSI today for symptom relief.   Patient did not complete RX PT due to being ill, requesting new RX to start.  Requesting refill for topical diclofenac.   Employment HX: teacher, currently retired.     Current Outpatient Medications:     amLODIPine (NORVASC) 10 MG tablet, TAKE ONE TABLET BY MOUTH EVERY DAY, " "Disp: 90 tablet, Rfl: 1    cetirizine (ZYRTEC) 10 MG tablet, Take 1 tablet (10 mg total) by mouth once daily., Disp: 90 tablet, Rfl: 0    FLUoxetine 20 MG capsule, Take 1 capsule (20 mg total) by mouth once daily., Disp: 30 capsule, Rfl: 3    diclofenac sodium (VOLTAREN) 1 % Gel, Apply 2 g topically 4 (four) times daily as needed (pain). Do not exceed 32 grams/day: do not to exceed 8 grams/day/single joint of upper extremities; do not to exceed 16 grams/day/single joint of lower extremities.  Please request refill of this medication from your PCP., Disp: 100 g, Rfl: 3    fluticasone propionate (FLONASE) 50 mcg/actuation nasal spray, 1 spray (50 mcg total) by Each Nostril route once daily. (Patient not taking: Reported on 12/17/2024), Disp: 16 g, Rfl: 0    vitamin D (VITAMIN D3) 1000 units Tab, Take 1,000 Units by mouth once daily. (Patient not taking: Reported on 9/17/2024), Disp: , Rfl:     Current Facility-Administered Medications:     LIDOcaine (PF) 10 mg/ml (1%) injection 50 mg, 5 mL, Other, 1 time in Clinic/HOD,     LIDOcaine (PF) 10 mg/ml (1%) injection 50 mg, 5 mL, Other, 1 time in Clinic/HOD,     triamcinolone acetonide injection 40 mg, 40 mg, Intra-articular, 1 time in Clinic/HOD,     triamcinolone acetonide injection 40 mg, 40 mg, Intra-articular, 1 time in Clinic/HOD,   Review of patient's allergies indicates:   Allergen Reactions    Lisinopril Swelling    Hydrocodone-acetaminophen Nausea And Vomiting     REVIEW OF SYSTEMS:  A ten-point review of systems was performed and is negative, except as mentioned above   Objective:   Body mass index is 35.52 kg/m².   Vitals:    12/17/24 1045 12/17/24 1046   BP: 117/73    Pulse: 73    Temp: 97.6 °F (36.4 °C)    TempSrc: Oral    Weight: 109.1 kg (240 lb 8.4 oz)    Height: 5' 9" (1.753 m)    PainSc:    8     MSK Knee Exam  General:  no apparent distress, no pain indicators,  obesity  Inspection: lower extremities in proportion with overall body habitus, no erythema   " ,  no erythema, limping gait w/ full weight partial (cane use in clinic today)  LEFT KNEE RIGHT KNEE   [x] Swelling mild  [x] Varus deformity  [] Rash [] Contusion  [] Mass  [] Scars [x] Swelling mild  [x] Varus deformity  [] Rash [] Contusion  [] Mass  [] Scars   Palpation:     LEFT KNEE RIGHT KNEE   Joint Warmth: normal  POMT: lateral joint line    [x] Patellar grind with compression  [x] Crepitus  [] Joint effusion  [x] Quad atrophy  [] Active mechanical locking with joint movement  [] Popliteal fullness  [] Tenderness medial joint line  [x] Tenderness lateral joint line  [] Tenderness of tibial plateau   [] Tenderness of patellar tendon  [] Tenderness of quadriceps tendon  [] Tenderness of prepatellar   [] Tenderness of infrapatellar  [] Tenderness of anserine bursae  [x] Tenderness of patellar facet  [] Tenderness over lateral retinaculum Joint Warmth: normal  POMT: lateral joint line  [x] Patellar grind with compression  [x] Crepitus  [] Joint effusion  [x] Quad atrophy  [] Active mechanical locking with joint movement  [] Popliteal fullness  [] Tenderness medial joint line  [x] Tenderness lateral joint line  [] Tenderness of tibial plateau   [] Tenderness of patellar tendon  [] Tenderness of quadriceps tendon  [] Tenderness of prepatellar  [] Tenderness of infrapatellar  [] Tenderness of anserine bursae  [x] Tenderness of patellar facet  [] Tenderness over lateral retinaculum   ROM Active Flexion / Extension (0-140)  Left 2 / 109 w/ pain Right 1 / 116 w/ pain   Strength Flexion / Extension (5 / 5)  Left 4 / 5 Right 4 / 5     Special Testing:         Not  Tested IT Band Syndrome L+ L-- R+ R--    [] Iris's Test [] [x] [] [x]     Joint Effusion        [] Ballotable Effusion [] [x] [] [x]    [] Fluid Wave [] [x] [] [x]     Patellar Testing        [] Apprehension [] [x] [] [x]     Meniscal Injury        [] Boy's Test [x] [] [x] []    [x] Thessaly's Test [] [] [] []     Ligament Injury        [] ACL Anterior  Drawer [] [x] [] [x]    [] PCL Posterior Drawer [] [x] [] [x]    [] LCL Varus Test [] [x] [] [x]    [] MCL Valgus Test [] [x] [] [x]      Hip Exam normal  Ankle Exam normal  Neurovascular: Intact to light touch  Neuro/ Psych: Awake, alert, oriented, normal mood and affect  Lymphatic: No LAD  Assessment:   IMAGING:  XR Ordered by me today 4 views of bilateral knee reviewed and independently interpreted by me with noted no acute findings noted, findings suggestive of arthritic changes, significant lateral joint space narrowing.  Awaiting radiologist findings.  Findings discussed with patient today.    MRI/CT: none    LABS:   Hemoglobin A1c   Date Value Ref Range Status   10/18/2024 5.0 <=7.0 % Final   04/21/2023 5.1 <=7.0 % Final   02/09/2022 5.2 <=7.0      EMR REVIEW: completed with noted prior visit 6/17/24 reviewed.  Diagnosis & Treatment Plan:   DIAGNOSIS: Moderate exacerbation of chronic Bilateral R = L Severe knee arthritis Kellgren-Grady Grade 4  1. Bilateral primary osteoarthritis of knee    2. BMI 35.0-35.9,adult      TREATMENT PLAN:  Orders Placed This Encounter    Large Joint Aspiration/Injection: bilateral knee    X-Ray Knee Complete 4 Or More Views Right    X-Ray Knee Complete 4 or More Views Left    triamcinolone acetonide injection 40 mg    triamcinolone acetonide injection 40 mg    LIDOcaine (PF) 10 mg/ml (1%) injection 50 mg    LIDOcaine (PF) 10 mg/ml (1%) injection 50 mg     Treatment plan:  Having adequate relief with current treatment plan. Intra-articular injections recommended today due to return of symptoms since prior visit which are impacting ADLs.  RX PT  with controlled, progressive tendon loading and gradual resumption of activity.  instructed to contact insurance provider in order to obtain provider which takes patient's insurance  Patient verbalized understanding, agrees to plan and denies further questions.   Ongoing education about DX, treatment recommendations and reasonable  expectations including goal to decrease pain and increase function with conservative treatments including, but limited to:  activity modification as needed, daily HEP with TheraBand, BMI reduction goal 5-10% of body weight, non-impact muscle strengthening with use of stationary bike (RPM set at 80 or > with slow progression to goal of 40 minutes 3-4 times per week as tolerated), walking-aides as needed, adequate vit D/C, glucosamine 1500 mg/day and/or daily acetaminophen 1000 mg 3 times/ day if able to tolerate.  Patient aware condition has no cure and treatment plan is focused on management of symptoms.  Procedure: CSI v. VS injections discussed, s/t failed conservative treatments patient consents to CSI today  RX Medications: continue medications as RX per PCP; refill for RX topical diclofenac as directed, medication precautions given.   RTC:  3 months  NOTE: Conversation had with patient discussing surgical versus conservative treatment options.  At this time patient declines referral to surgeon for further eval. and surgical treatment options.  If status changes patient will up date me.       Lili Lynn FNP [facility]  Procedure Note:   Large Joint Aspiration/Injection: bilateral knee    Date/Time: 12/17/2024 10:20 AM    Performed by: Lili Lynn NP  Authorized by: Lili Lynn NP    Indications:  Arthritis and pain  Location:  Knee  Laterality:  Bilateral  Site:  Bilateral knee  Medications (Right):  5 mL LIDOCAINE 1% (PF) 50 mg / 5 mL; 40 mg KENALOG 40 mg / 1 mL  Medications (Left):  5 mL LIDOCAINE 1% (PF) 50 mg / 5 mL; 40 mg KENALOG 40 mg / 1 mL  Ortho Procedure Note Bilateral  Knee lateral suprapatellar approach CSI  Indications: Therapeutic Indication - Decrease pain, Increase range of motion and improve quality of life.  Risks:  Possible complications with the injection include bleeding, infection (.01%), tendon rupture, steroid flare, fat pad  or soft tissue atrophy, skin depigmentation, allergic reaction to medications and vasovagal response. (steroid flare treatment is rest, ice, NSAIDs and resolves in 24-36 hours)  Consent:  Procedure, risks, benefits, and alternatives explained the patient, who voiced understanding and agreed to proceed with procedure.  Consent signed and scanned into the medical record.   No absolute contraindications (cellulitis overlying joint, infection, lack of informed consent, allergy to injection medication, AVN protein or egg allergy for sodium hyaluronate, or history of steroid flare) or relative contraindications (uncontrolled DM2 A1c>10, coagulopathy, INR > 3.5, previous joint replacement or history of AVN).        Staff: Lili Lynn FNP  Description:  Time-out performed.  The patient was prepped in normal sterile fashion use of chlorhexidine scrub and the appropriate and anatomic landmarks were identified.  Sterile 25g 1.5 inch needle was used. Topical ethyl chloride was applied.   Contents of syringe included: 5 ml 1% lidocaine (PF) with 40 mg Kenalog  Drainage: n/a  Complications: None   EBL: None   Post Procedure: Patient alert, and moving all extremities. ROM improved, pain decreased.  Good peripheral pulses, no signs of vascular compromise and range of motion intact.  Aftercare instructions were given to patient at time of discharge.  Relative rest for 3 days-avoiding excess activity.  Place ice on the area for 15 minutes every 4-6 hours. Patient may take Tylenol a 1000 mg b.i.d. or ibuprofen 600 mg t.i.d. for the next 3-4 days if not on medication already and safe to take pending co-morbidities.  Protect the area for the next 1-8 hours if anesthetic was used.  Avoid excessive activity for the next 3-4 weeks.  ER precautions given for fever, severe joint pain or allergic reaction or other new symptoms related to the joint injection.      Time Based Billing   None    *Please be aware that this note has been  generated with the assistance of [email or web address] voice-to-text.  Please excuse any spelling or grammatical errors. Positive findings indicted by checkmark*

## 2024-12-19 ENCOUNTER — TELEPHONE (OUTPATIENT)
Dept: FAMILY MEDICINE | Facility: CLINIC | Age: 69
End: 2024-12-19
Payer: MEDICARE

## 2024-12-19 DIAGNOSIS — E21.3 HYPERPARATHYROIDISM: Primary | ICD-10-CM

## 2024-12-19 NOTE — TELEPHONE ENCOUNTER
Attempted to call patient. Mailbox is full.    ----- Message from Berta Lopez MD sent at 12/19/2024  2:09 PM CST -----  Since appointment with us is canceled to discuss the labs, please let the patient know that her parathyroid hormone that we checked for the high calcium is actually elevated so she will need to see an endocrinologist.  The parathyroid hormone is prod  uced by parathyroid glands and if it is in access in might be due to what it is called adenoma/benign tumor so she will need to see endocrinology for further imaging and management.

## 2024-12-19 NOTE — TELEPHONE ENCOUNTER
Attempted to call patient. Mailbox is full.    ----- Message from Berta Lopez MD sent at 12/19/2024  2:09 PM CST -----  Also please advise patient to continue vitamin-D over-the-counter.  Has significantly improved from years ago.

## 2024-12-20 ENCOUNTER — TELEPHONE (OUTPATIENT)
Dept: FAMILY MEDICINE | Facility: CLINIC | Age: 69
End: 2024-12-20
Payer: MEDICARE

## 2024-12-20 NOTE — TELEPHONE ENCOUNTER
Called patient via phone call and patient understands results given. No further questions at this time.     ----- Message from Berta Lopez MD sent at 12/19/2024  2:09 PM CST -----  Also please advise patient to continue vitamin-D over-the-counter.  Has significantly improved from years ago.

## 2024-12-20 NOTE — TELEPHONE ENCOUNTER
Called patient via phone call and patient understands results given. No further questions at this time.     ----- Message from Berta Lopez MD sent at 12/19/2024  2:09 PM CST -----  Since appointment with us is canceled to discuss the labs, please let the patient know that her parathyroid hormone that we checked for the high calcium is actually elevated so she will need to see an endocrinologist.  The parathyroid hormone is prod  uced by parathyroid glands and if it is in access in might be due to what it is called adenoma/benign tumor so she will need to see endocrinology for further imaging and management.

## 2025-01-14 DIAGNOSIS — F34.1 PERSISTENT DEPRESSIVE DISORDER: ICD-10-CM

## 2025-01-14 NOTE — TELEPHONE ENCOUNTER
Please see attached refill request.    Next scheduled office visit: 2/19/2025.    Last office visit: 7/5/2024.     Thank you!

## 2025-01-15 RX ORDER — FLUOXETINE HYDROCHLORIDE 20 MG/1
20 CAPSULE ORAL DAILY
Qty: 30 CAPSULE | Refills: 3 | Status: SHIPPED | OUTPATIENT
Start: 2025-01-15

## 2025-03-17 ENCOUNTER — OFFICE VISIT (OUTPATIENT)
Dept: ORTHOPEDICS | Facility: CLINIC | Age: 70
End: 2025-03-17
Payer: MEDICARE

## 2025-03-17 VITALS
SYSTOLIC BLOOD PRESSURE: 127 MMHG | BODY MASS INDEX: 36.31 KG/M2 | DIASTOLIC BLOOD PRESSURE: 80 MMHG | HEIGHT: 69 IN | WEIGHT: 245.13 LBS | TEMPERATURE: 97 F | HEART RATE: 80 BPM

## 2025-03-17 DIAGNOSIS — M17.0 BILATERAL PRIMARY OSTEOARTHRITIS OF KNEE: Primary | ICD-10-CM

## 2025-03-17 DIAGNOSIS — M17.9 OSTEOARTHRITIS OF KNEE, UNSPECIFIED LATERALITY, UNSPECIFIED OSTEOARTHRITIS TYPE: ICD-10-CM

## 2025-03-17 PROCEDURE — 3074F SYST BP LT 130 MM HG: CPT | Mod: CPTII,,, | Performed by: NURSE PRACTITIONER

## 2025-03-17 PROCEDURE — 3079F DIAST BP 80-89 MM HG: CPT | Mod: CPTII,,, | Performed by: NURSE PRACTITIONER

## 2025-03-17 PROCEDURE — 3008F BODY MASS INDEX DOCD: CPT | Mod: CPTII,,, | Performed by: NURSE PRACTITIONER

## 2025-03-17 PROCEDURE — 1160F RVW MEDS BY RX/DR IN RCRD: CPT | Mod: CPTII,,, | Performed by: NURSE PRACTITIONER

## 2025-03-17 PROCEDURE — 1101F PT FALLS ASSESS-DOCD LE1/YR: CPT | Mod: CPTII,,, | Performed by: NURSE PRACTITIONER

## 2025-03-17 PROCEDURE — 1125F AMNT PAIN NOTED PAIN PRSNT: CPT | Mod: CPTII,,, | Performed by: NURSE PRACTITIONER

## 2025-03-17 PROCEDURE — 3288F FALL RISK ASSESSMENT DOCD: CPT | Mod: CPTII,,, | Performed by: NURSE PRACTITIONER

## 2025-03-17 PROCEDURE — 20610 DRAIN/INJ JOINT/BURSA W/O US: CPT | Mod: 50,PBBFAC | Performed by: NURSE PRACTITIONER

## 2025-03-17 PROCEDURE — 99214 OFFICE O/P EST MOD 30 MIN: CPT | Mod: PBBFAC | Performed by: NURSE PRACTITIONER

## 2025-03-17 PROCEDURE — 99214 OFFICE O/P EST MOD 30 MIN: CPT | Mod: 25,S$PBB,, | Performed by: NURSE PRACTITIONER

## 2025-03-17 PROCEDURE — 1159F MED LIST DOCD IN RCRD: CPT | Mod: CPTII,,, | Performed by: NURSE PRACTITIONER

## 2025-03-17 RX ORDER — TRIAMCINOLONE ACETONIDE 40 MG/ML
40 INJECTION, SUSPENSION INTRA-ARTICULAR; INTRAMUSCULAR ONCE
Status: COMPLETED | OUTPATIENT
Start: 2025-03-17 | End: 2025-03-17

## 2025-03-17 RX ORDER — DICLOFENAC SODIUM 10 MG/G
2 GEL TOPICAL 4 TIMES DAILY PRN
Qty: 100 G | Refills: 3 | Status: SHIPPED | OUTPATIENT
Start: 2025-03-17 | End: 2026-02-15

## 2025-03-17 RX ORDER — LIDOCAINE HYDROCHLORIDE 10 MG/ML
5 INJECTION, SOLUTION EPIDURAL; INFILTRATION; INTRACAUDAL; PERINEURAL
Status: COMPLETED | OUTPATIENT
Start: 2025-03-17 | End: 2025-03-17

## 2025-03-17 RX ADMIN — TRIAMCINOLONE ACETONIDE 40 MG: 40 INJECTION, SUSPENSION INTRA-ARTICULAR; INTRAMUSCULAR at 09:03

## 2025-03-17 RX ADMIN — LIDOCAINE HYDROCHLORIDE 5 ML: 10 INJECTION, SOLUTION EPIDURAL; INFILTRATION; INTRACAUDAL; PERINEURAL at 09:03

## 2025-03-17 NOTE — PROGRESS NOTES
"[facility]  Subjective:   PATIENT ID: Maira Terry is a 69 y.o. female.   CHIEF COMPLAINT: Knee Pain of the Left Knee (F/U Bilat Knee pain. Pain level 10 today. Amb With Cane) and Knee Pain of the Right Knee    HPI:  Bilateral L < R knee pain medial, latera, anterior stiffness and aching   Injury/ Surgical HX r/t CC:  no HX of prior significant joint injury   Onset: several years  fluctuates    Modifying Factors: exacerbated by:  increased activity, prolonged sitting, prolonged walking/ standing improved with:  movement in less than 30 minutes , rest   Associated Symptoms:  [] joint locking  [] joint catching  [x] decreased ROM  [x] crepitus [x] Weakness/ "giving out"  [] falls  [] swelling  [x] difficult sleeping s/t pain        Activity: sedentary with light activity and pain moderately interferes with ADLs, assistive device cane   Previous Treatments:  [x] HEP > 6 weeks  [x] RX PT 8 wks/ 3xs per week, completed 2023  [] Off-loading brace  [x] Soft knee splint [x] OTC Pain Relievers: Tylenol, ibuprofen  [x] RX Medications: topical diclofenac  [x] CSI since 2024, last injection 9/17/24  [x] Hyaluronic acid injections since 2023, last injection 10/12/23 (preferred CSI)   PMH:  non-smoker, obesity, HTN, and DM    Family History: + OA   NOTE:  Follow up, seen by me since 2024  for bilateral knee DJD.  Conservative treatments providing adequate relief at this time and is not interested in surgical treatment options at this time.  CSI given 9/17/24 with adequate relief, lasting 2-3 months.  Symptoms returning recently over past few weeks and are affecting ADLs.  Requesting CSI today for symptom relief.   Requesting refill for topical diclofenac.   Employment HX: teacher, currently retired.     Current Outpatient Medications:     amLODIPine (NORVASC) 10 MG tablet, TAKE ONE TABLET BY MOUTH EVERY DAY, Disp: 90 tablet, Rfl: 1    cetirizine (ZYRTEC) 10 MG " "tablet, Take 1 tablet (10 mg total) by mouth once daily., Disp: 90 tablet, Rfl: 0    diclofenac sodium (VOLTAREN) 1 % Gel, Apply 2 g topically 4 (four) times daily as needed (pain). Do not exceed 32 grams/day: do not to exceed 8 grams/day/single joint of upper extremities; do not to exceed 16 grams/day/single joint of lower extremities.  Please request refill of this medication from your PCP., Disp: 100 g, Rfl: 3    FLUoxetine 20 MG capsule, Take 1 capsule (20 mg total) by mouth once daily., Disp: 30 capsule, Rfl: 3    fluticasone propionate (FLONASE) 50 mcg/actuation nasal spray, 1 spray (50 mcg total) by Each Nostril route once daily. (Patient not taking: Reported on 10/10/2024), Disp: 16 g, Rfl: 0    vitamin D (VITAMIN D3) 1000 units Tab, Take 1,000 Units by mouth once daily. (Patient not taking: Reported on 3/17/2025), Disp: , Rfl:     Current Facility-Administered Medications:     LIDOcaine (PF) 10 mg/ml (1%) injection 50 mg, 5 mL, Intra-articular, 1 time in Clinic/HOD,     LIDOcaine (PF) 10 mg/ml (1%) injection 50 mg, 5 mL, Intra-articular, 1 time in Clinic/HOD,     triamcinolone acetonide injection 40 mg, 40 mg, Intra-articular, Once,     triamcinolone acetonide injection 40 mg, 40 mg, Intra-articular, Once,   Review of patient's allergies indicates:   Allergen Reactions    Lisinopril Swelling    Hydrocodone-acetaminophen Nausea And Vomiting     REVIEW OF SYSTEMS:  A ten-point review of systems was performed and is negative, except as mentioned above   Objective:   Body mass index is 36.2 kg/m².   Vitals:    03/17/25 0910 03/17/25 0911 03/17/25 0915   BP: (!) 145/81  127/80   Pulse: 80     Temp: 97.4 °F (36.3 °C)     TempSrc: Oral     Weight: 111.2 kg (245 lb 2.4 oz)     Height: 5' 9" (1.753 m)     PainSc:  10-Worst pain ever      MSK Knee Exam  General:  no apparent distress, no pain indicators,  obesity  Inspection: lower extremities in proportion with overall body habitus, no erythema   ,  no erythema, " limping gait w/ full weight partial (cane use in clinic today)  LEFT KNEE RIGHT KNEE   [x] Swelling mild  [x] Varus deformity  [] Rash [] Contusion  [] Mass  [] Scars [x] Swelling mild  [x] Varus deformity  [] Rash [] Contusion  [] Mass  [] Scars   Palpation:     LEFT KNEE RIGHT KNEE   Joint Warmth: normal  POMT: lateral joint line    [x] Patellar grind with compression  [x] Crepitus  [] Joint effusion  [x] Quad atrophy  [] Active mechanical locking with joint movement  [] Popliteal fullness  [] Tenderness medial joint line  [x] Tenderness lateral joint line  [] Tenderness of tibial plateau   [] Tenderness of patellar tendon  [] Tenderness of quadriceps tendon  [] Tenderness of prepatellar   [] Tenderness of infrapatellar  [] Tenderness of anserine bursae  [x] Tenderness of patellar facet  [] Tenderness over lateral retinaculum Joint Warmth: normal  POMT: lateral joint line  [x] Patellar grind with compression  [x] Crepitus  [] Joint effusion  [x] Quad atrophy  [] Active mechanical locking with joint movement  [] Popliteal fullness  [] Tenderness medial joint line  [x] Tenderness lateral joint line  [] Tenderness of tibial plateau   [] Tenderness of patellar tendon  [] Tenderness of quadriceps tendon  [] Tenderness of prepatellar  [] Tenderness of infrapatellar  [] Tenderness of anserine bursae  [x] Tenderness of patellar facet  [] Tenderness over lateral retinaculum   ROM Active Flexion / Extension (0-140)  Left 2 / 110 w/ pain Right 1 / 115 w/ pain   Strength Flexion / Extension (5 / 5)  Left 4 / 5 Right 4 / 5     Special Testing:         Not  Tested IT Band Syndrome L+ L-- R+ R--    [] Iris's Test [] [x] [] [x]     Joint Effusion        [] Ballotable Effusion [] [x] [] [x]    [] Fluid Wave [] [x] [] [x]     Patellar Testing        [] Apprehension [] [x] [] [x]     Meniscal Injury        [] Boy's Test [x] [] [x] []    [x] Thessaly's Test [] [] [] []     Ligament Injury        [] ACL Anterior Drawer [] [x] []  [x]    [] PCL Posterior Drawer [] [x] [] [x]    [] LCL Varus Test [] [x] [] [x]    [] MCL Valgus Test [] [x] [] [x]      Hip Exam normal  Ankle Exam normal  Neurovascular: Intact to light touch  Neuro/ Psych: Awake, alert, oriented, normal mood and affect  Lymphatic: No LAD  Assessment:   IMAGING:  XR noted in EMR dated 12/17/24  4 views of bilateral knee reviewed and independently interpreted by me with noted no acute findings noted, findings suggestive of arthritic changes, significant lateral joint space narrowing.  Radiologist findings reviewed.  Findings discussed with patient today.    EXAMINATION: XR KNEE COMP 4 OR MORE VIEWS LEFT 12/17/24  CLINICAL HISTORY:  Pain, unspecified  COMPARISON:  None.  FINDINGS:  No acute displaced fractures or dislocations.  There is narrowing of the lateral compartment of the knee joint with presence of marginal osteophytes degenerative changes seen also of the patellofemoral joint articular spaces are otherwise preserved with smooth articular surfaces  No blastic or lytic lesions.  Soft tissues within normal limits.  Impression:  Degenerative changes    EXAMINATION: XR KNEE COMP 4 OR MORE VIEWS RIGHT 12/17/24  CLINICAL HISTORY:  Pain, unspecified  COMPARISON:  None.  FINDINGS:  No acute displaced fractures or dislocations.  There is narrowing of the lateral compartment of the knee joint with marginal osteophytes on the medial compartment  There might be changes suggestive of a patella jairo on the standing projection there are degenerative changes of the lateral compartment of the contralateral knee articular spaces otherwise preserved with smooth articular surfaces  Soft tissues within normal limits.  Impression   Degenerative changes.  Changes suggestive of patella Allred.     MRI/CT: none    LABS:   Hemoglobin A1c   Date Value Ref Range Status   10/18/2024 5.0 <=7.0 % Final   04/21/2023 5.1 <=7.0 % Final   02/09/2022 5.2 <=7.0      EMR REVIEW: completed with noted prior visit  12/17/24 reviewed.  Diagnosis & Treatment Plan:   DIAGNOSIS: Moderate exacerbation of chronic Bilateral R = L Severe knee arthritis Kellgren-Rgady Grade 4 complicated by obesity   1. Bilateral primary osteoarthritis of knee    2. BMI 36.0-36.9,adult      TREATMENT PLAN:  Orders Placed This Encounter    Large Joint Aspiration/Injection    triamcinolone acetonide injection 40 mg    triamcinolone acetonide injection 40 mg    LIDOcaine (PF) 10 mg/ml (1%) injection 50 mg    LIDOcaine (PF) 10 mg/ml (1%) injection 50 mg     Treatment plan:  Having adequate relief with current treatment plan. Intra-articular injections recommended today due to return of symptoms since prior visit which are impacting ADLs.  RX PT  with controlled, progressive tendon loading and gradual resumption of activity.  instructed to contact insurance provider in order to obtain provider which takes patient's insurance  Patient verbalized understanding, agrees to plan and denies further questions.   Ongoing education about DX, treatment recommendations and reasonable expectations including goal to decrease pain and increase function with conservative treatments including, but limited to:  activity modification as needed, daily HEP with TheraBand, BMI reduction goal 5-10% of body weight, non-impact muscle strengthening with use of stationary bike (RPM set at 80 or > with slow progression to goal of 40 minutes 3-4 times per week as tolerated), walking-aides as needed, adequate vit D/C, glucosamine 1500 mg/day and/or daily acetaminophen 1000 mg 3 times/ day if able to tolerate.  Patient aware condition has no cure and treatment plan is focused on management of symptoms.  Procedure: CSI v. VS injections discussed, s/t failed conservative treatments patient consents to CSI today  RX Medications: continue medications as RX per PCP; refill for RX topical diclofenac as directed, medication precautions given.   RTC:  3 months  NOTE: Conversation had with  patient discussing surgical versus conservative treatment options.  At this time patient declines referral to surgeon for further eval. and surgical treatment options.  If status changes patient will up date me.       Lili CELESTIN  [facility]  Procedure Note:   Large Joint Aspiration/Injection: bilateral knee    Date/Time: 3/17/2025 9:40 AM    Performed by: Lili Lynn NP  Authorized by: Lili Lynn NP    Indications:  Pain and arthritis  Location:  Knee  Laterality:  Bilateral  Site:  Bilateral knee  Medications (Right):  5 mL 1% Lidocaine - 5mL; 40 mg Kenalog 40  Medications (Left):  5 mL 1% Lidocaine - 5mL; 40 mg Kenalog 40  Ortho Procedure Note Bilateral  Knee lateral suprapatellar approach CSI  Indications: Therapeutic Indication - Decrease pain, Increase range of motion and improve quality of life.  Risks:  Possible complications with the injection include bleeding, infection (.01%), tendon rupture, steroid flare, fat pad or soft tissue atrophy, skin depigmentation, allergic reaction to medications and vasovagal response. (steroid flare treatment is rest, ice, NSAIDs and resolves in 24-36 hours)  Consent:  Procedure, risks, benefits, and alternatives explained the patient, who voiced understanding and agreed to proceed with procedure.  Consent signed and scanned into the medical record.   No absolute contraindications (cellulitis overlying joint, infection, lack of informed consent, allergy to injection medication, AVN protein or egg allergy for sodium hyaluronate, or history of steroid flare) or relative contraindications (uncontrolled DM2 A1c>10, coagulopathy, INR > 3.5, previous joint replacement or history of AVN).        Staff: Lili CELESTIN  Description:  Time-out performed.  The patient was prepped in normal sterile fashion use of chlorhexidine scrub and the appropriate and anatomic landmarks were identified.  Sterile 25g 1.5  inch needle was used. Topical ethyl chloride was applied.   Contents of syringe included: 5 ml 1% lidocaine (PF) with 40 mg Kenalog  Drainage: n/a  Complications: None   EBL: None   Post Procedure: Patient alert, and moving all extremities. ROM improved, pain decreased.  Good peripheral pulses, no signs of vascular compromise and range of motion intact.  Aftercare instructions were given to patient at time of discharge.  Relative rest for 3 days-avoiding excess activity.  Place ice on the area for 15 minutes every 4-6 hours. Patient may take Tylenol a 1000 mg b.i.d. or ibuprofen 600 mg t.i.d. for the next 3-4 days if not on medication already and safe to take pending co-morbidities.  Protect the area for the next 1-8 hours if anesthetic was used.  Avoid excessive activity for the next 3-4 weeks.  ER precautions given for fever, severe joint pain or allergic reaction or other new symptoms related to the joint injection.      Time Based Billing   None    *Please be aware that this note has been generated with the assistance of MModal voice-to-text.  Please excuse any spelling or grammatical errors. Positive findings indicted by checkmark*

## 2025-04-03 ENCOUNTER — OFFICE VISIT (OUTPATIENT)
Dept: BEHAVIORAL HEALTH | Facility: CLINIC | Age: 70
End: 2025-04-03
Payer: MEDICARE

## 2025-04-03 VITALS
HEIGHT: 69 IN | WEIGHT: 248.88 LBS | SYSTOLIC BLOOD PRESSURE: 106 MMHG | BODY MASS INDEX: 36.86 KG/M2 | DIASTOLIC BLOOD PRESSURE: 70 MMHG

## 2025-04-03 DIAGNOSIS — F34.1 PERSISTENT DEPRESSIVE DISORDER: Primary | ICD-10-CM

## 2025-04-03 PROCEDURE — 3008F BODY MASS INDEX DOCD: CPT | Mod: CPTII,,, | Performed by: NURSE PRACTITIONER

## 2025-04-03 PROCEDURE — 1160F RVW MEDS BY RX/DR IN RCRD: CPT | Mod: CPTII,,, | Performed by: NURSE PRACTITIONER

## 2025-04-03 PROCEDURE — 1159F MED LIST DOCD IN RCRD: CPT | Mod: CPTII,,, | Performed by: NURSE PRACTITIONER

## 2025-04-03 PROCEDURE — 3074F SYST BP LT 130 MM HG: CPT | Mod: CPTII,,, | Performed by: NURSE PRACTITIONER

## 2025-04-03 PROCEDURE — 3078F DIAST BP <80 MM HG: CPT | Mod: CPTII,,, | Performed by: NURSE PRACTITIONER

## 2025-04-03 PROCEDURE — 99213 OFFICE O/P EST LOW 20 MIN: CPT | Mod: PBBFAC,PN | Performed by: NURSE PRACTITIONER

## 2025-04-03 PROCEDURE — 99214 OFFICE O/P EST MOD 30 MIN: CPT | Mod: S$PBB,,, | Performed by: NURSE PRACTITIONER

## 2025-04-03 RX ORDER — FLUOXETINE HYDROCHLORIDE 20 MG/1
20 CAPSULE ORAL DAILY
Qty: 30 CAPSULE | Refills: 3 | Status: SHIPPED | OUTPATIENT
Start: 2025-04-03 | End: 2025-04-03

## 2025-04-03 RX ORDER — FLUOXETINE HYDROCHLORIDE 40 MG/1
40 CAPSULE ORAL DAILY
Qty: 30 CAPSULE | Refills: 3 | Status: SHIPPED | OUTPATIENT
Start: 2025-04-03
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
## 2025-04-24 DIAGNOSIS — I10 PRIMARY HYPERTENSION: ICD-10-CM

## 2025-04-25 ENCOUNTER — TELEPHONE (OUTPATIENT)
Dept: FAMILY MEDICINE | Facility: CLINIC | Age: 70
End: 2025-04-25
Payer: MEDICARE

## 2025-04-25 DIAGNOSIS — Z12.11 SCREENING FOR COLON CANCER: Primary | ICD-10-CM

## 2025-04-25 RX ORDER — POLYETHYLENE GLYCOL 3350, SODIUM SULFATE, SODIUM CHLORIDE, POTASSIUM CHLORIDE, SODIUM ASCORBATE, AND ASCORBIC ACID 7.5-2.691G
KIT ORAL
Qty: 1 KIT | Refills: 0 | Status: SHIPPED | OUTPATIENT
Start: 2025-04-25

## 2025-04-25 RX ORDER — AMLODIPINE BESYLATE 10 MG/1
10 TABLET ORAL DAILY
Qty: 90 TABLET | Refills: 0 | Status: SHIPPED | OUTPATIENT
Start: 2025-04-25 | End: 2025-07-24

## 2025-04-25 NOTE — TELEPHONE ENCOUNTER
Please schedule patient with next available  provider to establish care. Former patient of Dr. Sweeney.    Thank you,    JACOB Zhou

## 2025-04-25 NOTE — TELEPHONE ENCOUNTER
----- Message from Emily sent at 4/25/2025 11:43 AM CDT -----  Regarding: Refill  Provider: Dr. ABELARDO Puckett Pharmacy: [facility] Visit: Next Visit: Patient's Contact Number: 1. Name of Medication: amLODIPine (NORVASC) 10 MG tabletDosage: Comments: 2. Name of Medication: Dosage: Comments: 3. Name of Medication: Dosage: Comments 4. Name of Medication: Dosage: Comments: 5. Name of Medication: Dosage: Comments:

## 2025-05-29 RX ORDER — SPIRONOLACTONE 50 MG/1
50 TABLET, FILM COATED ORAL 2 TIMES DAILY
COMMUNITY
End: 2025-08-06 | Stop reason: SDUPTHER

## 2025-06-07 ENCOUNTER — HOSPITAL ENCOUNTER (EMERGENCY)
Facility: HOSPITAL | Age: 70
Discharge: HOME | End: 2025-06-07
Attending: FAMILY MEDICINE
Payer: MEDICARE

## 2025-06-07 VITALS
RESPIRATION RATE: 20 BRPM | DIASTOLIC BLOOD PRESSURE: 65 MMHG | HEIGHT: 70 IN | SYSTOLIC BLOOD PRESSURE: 111 MMHG | WEIGHT: 243 LBS | TEMPERATURE: 98 F | OXYGEN SATURATION: 100 % | HEART RATE: 84 BPM | BODY MASS INDEX: 34.79 KG/M2

## 2025-06-07 DIAGNOSIS — M17.0 PRIMARY OSTEOARTHRITIS OF BOTH KNEES: Primary | ICD-10-CM

## 2025-06-07 PROCEDURE — 99284 EMERGENCY DEPT VISIT MOD MDM: CPT | Mod: 25

## 2025-06-07 PROCEDURE — 63600175 PHARM REV CODE 636 W HCPCS: Performed by: FAMILY MEDICINE

## 2025-06-07 PROCEDURE — 96372 THER/PROPH/DIAG INJ SC/IM: CPT | Performed by: FAMILY MEDICINE

## 2025-06-07 RX ORDER — PREDNISONE 20 MG/1
20 TABLET ORAL DAILY
Qty: 7 TABLET | Refills: 0 | Status: SHIPPED | OUTPATIENT
Start: 2025-06-07 | End: 2025-06-14

## 2025-06-07 RX ORDER — DEXAMETHASONE SODIUM PHOSPHATE 4 MG/ML
8 INJECTION, SOLUTION INTRA-ARTICULAR; INTRALESIONAL; INTRAMUSCULAR; INTRAVENOUS; SOFT TISSUE
Status: COMPLETED | OUTPATIENT
Start: 2025-06-07 | End: 2025-06-07

## 2025-06-07 RX ADMIN — DEXAMETHASONE SODIUM PHOSPHATE 8 MG: 4 INJECTION, SOLUTION INTRA-ARTICULAR; INTRALESIONAL; INTRAMUSCULAR; INTRAVENOUS; SOFT TISSUE at 01:06

## 2025-06-07 NOTE — ED PROVIDER NOTES
Encounter Date: 6/7/2025       History     Chief Complaint   Patient presents with    Knee Pain     Pt c/o bilateral knee pain x 1 week, complains left knee is swollen and right knee is stiff. Pt is seen in Ortho clinic for knee problems. Percy.      69-year-old presents with bilateral knee pain says she has chronic knee pain no injury history of arthritis feels like he just feeling that we will not a cortisone shot vital signs are stable physical exam knees really have no edema no warmth no swelling in his has some crepitus no signs of trauma we will give her a cortisone shot sent home with a few days of prednisone and anti-inflammatories patient understands agrees with the plan        Review of patient's allergies indicates:   Allergen Reactions    Lisinopril Swelling    Hydrocodone-acetaminophen Nausea And Vomiting     Past Medical History:   Diagnosis Date    Depression     Disorder of kidney and ureter     Hypertension     Personal history of colonic polyps 03/31/2022    Marilee Lee MD     Past Surgical History:   Procedure Laterality Date    CHOLECYSTECTOMY      COLONOSCOPY  03/31/2022    Marilee Lee MD    HYSTERECTOMY      TONSILLECTOMY       Family History   Problem Relation Name Age of Onset    Hypertension Mother      Heart attack Mother       Social History[1]  Review of Systems   Musculoskeletal:  Positive for arthralgias.   All other systems reviewed and are negative.      Physical Exam     Initial Vitals [06/07/25 1307]   BP Pulse Resp Temp SpO2   111/65 84 20 97.7 °F (36.5 °C) 100 %      MAP       --         Physical Exam    Nursing note and vitals reviewed.  Constitutional: She appears well-developed and well-nourished. She is active.   HENT:   Head: Normocephalic and atraumatic.   Eyes: Conjunctivae, EOM and lids are normal. Pupils are equal, round, and reactive to light.   Neck: Trachea normal and phonation normal. Neck supple. No thyroid mass present.   Normal range of motion.  Cardiovascular:   Normal rate, regular rhythm, normal heart sounds and normal pulses.           Pulmonary/Chest: Breath sounds normal.   Abdominal: Abdomen is soft. Bowel sounds are normal.   Musculoskeletal:         General: Normal range of motion.      Cervical back: Normal range of motion and neck supple.     Neurological: She is alert and oriented to person, place, and time. She has normal strength and normal reflexes.   Skin: Skin is warm and intact.   Psychiatric: She has a normal mood and affect. Her speech is normal and behavior is normal. Judgment and thought content normal. Cognition and memory are normal.         ED Course   Procedures  Labs Reviewed - No data to display       Imaging Results    None          Medications   dexAMETHasone injection 8 mg (has no administration in time range)     Medical Decision Making  69-year-old presents with bilateral knee pain says she has chronic knee pain no injury history of arthritis feels like he just feeling that we will not a cortisone shot vital signs are stable physical exam knees really have no edema no warmth no swelling in his has some crepitus no signs of trauma we will give her a cortisone shot sent home with a few days of prednisone and anti-inflammatories patient understands agrees with the plan          Risk  Prescription drug management.  Risk Details: Differential diagnosis osteoarthritis DJD rheumatoid arthritis                                      Clinical Impression:  Final diagnoses:  [M17.0] Primary osteoarthritis of both knees (Primary)          ED Disposition Condition    Discharge Stable          ED Prescriptions       Medication Sig Dispense Start Date End Date Auth. Provider    predniSONE (DELTASONE) 20 MG tablet Take 1 tablet (20 mg total) by mouth once daily. for 7 days 7 tablet 6/7/2025 6/14/2025 Rosendo Thrasher MD          Follow-up Information       Follow up With Specialties Details Why Contact Info    [facility] Emergency  Medicine  As needed [address] [facility] [id number] [phone]                   [1]   Social History  Tobacco Use    Smoking status: Never    Smokeless tobacco: Never   Substance Use Topics    Alcohol use: Never    Drug use: Never        Rosendo Thrasher MD  06/07/25 1332

## 2025-06-17 ENCOUNTER — OFFICE VISIT (OUTPATIENT)
Dept: ORTHOPEDICS | Facility: CLINIC | Age: 70
End: 2025-06-17
Payer: MEDICARE

## 2025-06-17 VITALS
WEIGHT: 246.94 LBS | HEART RATE: 77 BPM | TEMPERATURE: 99 F | DIASTOLIC BLOOD PRESSURE: 80 MMHG | OXYGEN SATURATION: 99 % | BODY MASS INDEX: 35.35 KG/M2 | HEIGHT: 70 IN | RESPIRATION RATE: 19 BRPM | SYSTOLIC BLOOD PRESSURE: 136 MMHG

## 2025-06-17 DIAGNOSIS — M17.0 BILATERAL PRIMARY OSTEOARTHRITIS OF KNEE: Primary | ICD-10-CM

## 2025-06-17 PROCEDURE — 1159F MED LIST DOCD IN RCRD: CPT | Mod: CPTII,,, | Performed by: NURSE PRACTITIONER

## 2025-06-17 PROCEDURE — 20610 DRAIN/INJ JOINT/BURSA W/O US: CPT | Mod: 50,PBBFAC | Performed by: NURSE PRACTITIONER

## 2025-06-17 PROCEDURE — 3008F BODY MASS INDEX DOCD: CPT | Mod: CPTII,,, | Performed by: NURSE PRACTITIONER

## 2025-06-17 PROCEDURE — 3079F DIAST BP 80-89 MM HG: CPT | Mod: CPTII,,, | Performed by: NURSE PRACTITIONER

## 2025-06-17 PROCEDURE — 3288F FALL RISK ASSESSMENT DOCD: CPT | Mod: CPTII,,, | Performed by: NURSE PRACTITIONER

## 2025-06-17 PROCEDURE — 99214 OFFICE O/P EST MOD 30 MIN: CPT | Mod: 25,S$PBB,, | Performed by: NURSE PRACTITIONER

## 2025-06-17 PROCEDURE — 1160F RVW MEDS BY RX/DR IN RCRD: CPT | Mod: CPTII,,, | Performed by: NURSE PRACTITIONER

## 2025-06-17 PROCEDURE — 1101F PT FALLS ASSESS-DOCD LE1/YR: CPT | Mod: CPTII,,, | Performed by: NURSE PRACTITIONER

## 2025-06-17 PROCEDURE — 1125F AMNT PAIN NOTED PAIN PRSNT: CPT | Mod: CPTII,,, | Performed by: NURSE PRACTITIONER

## 2025-06-17 PROCEDURE — 99214 OFFICE O/P EST MOD 30 MIN: CPT | Mod: PBBFAC | Performed by: NURSE PRACTITIONER

## 2025-06-17 PROCEDURE — 3075F SYST BP GE 130 - 139MM HG: CPT | Mod: CPTII,,, | Performed by: NURSE PRACTITIONER

## 2025-06-17 RX ORDER — LIDOCAINE HYDROCHLORIDE 10 MG/ML
5 INJECTION, SOLUTION EPIDURAL; INFILTRATION; INTRACAUDAL; PERINEURAL
Status: COMPLETED | OUTPATIENT
Start: 2025-06-17 | End: 2025-06-17

## 2025-06-17 RX ORDER — DEXAMETHASONE SODIUM PHOSPHATE 4 MG/ML
4 INJECTION, SOLUTION INTRA-ARTICULAR; INTRALESIONAL; INTRAMUSCULAR; INTRAVENOUS; SOFT TISSUE
Status: COMPLETED | OUTPATIENT
Start: 2025-06-17 | End: 2025-06-17

## 2025-06-17 RX ADMIN — LIDOCAINE HYDROCHLORIDE 5 ML: 10 INJECTION, SOLUTION EPIDURAL; INFILTRATION; INTRACAUDAL; PERINEURAL at 08:06

## 2025-06-17 RX ADMIN — DEXAMETHASONE SODIUM PHOSPHATE 4 MG: 4 INJECTION, SOLUTION INTRA-ARTICULAR; INTRALESIONAL; INTRAMUSCULAR; INTRAVENOUS; SOFT TISSUE at 08:06

## 2025-06-17 NOTE — PROGRESS NOTES
"[facility] - Orthoped[facility] Sports Medicine Clinic  Subjective:   PATIENT ID: Maira Terry is a 70 y.o. female.   CHIEF COMPLAINT: Injections of the Left Knee and Injections of the Right Knee    HPI:  Bilateral L < R knee pain medial, latera, anterior stiffness and aching   Injury/ Surgical HX r/t CC:  no HX of prior significant joint injury   Onset: several years  fluctuates    Modifying Factors: exacerbated by:  increased activity, prolonged sitting, prolonged walking/ standing improved with:  movement in less than 30 minutes , rest   Associated Symptoms:  [] joint locking  [] joint catching  [x] decreased ROM  [x] crepitus [x] Weakness/ "giving out"  [] falls  [] swelling  [x] difficult sleeping s/t pain        Activity: sedentary with light activity and pain moderately interferes with ADLs, assistive device cane   Previous Treatments:  [x] HEP > 6 weeks  [x] RX PT 8 wks/ 3xs per week, completed 2023  [] Off-loading brace  [x] Soft knee splint [x] OTC Pain Relievers: Tylenol, ibuprofen  [x] RX Medications: topical diclofenac  [x] CSI since 2024, last injection 3/17/25  [x] Hyaluronic acid injections since 2023, last injection 10/12/23 (preferred CSI)   PMH:  non-smoker, obesity, HTN, and DM    Family History: + OA   NOTE:  Follow up, seen by me since 2024 for bilateral knee DJD.  Conservative treatments providing adequate relief at this time and is not interested in surgical treatment options at this time.  CSI given 3/17/25 with adequate relief, lasting 2-3 months.  Symptoms returning recently over past few weeks and are affecting ADLs.  Requesting CSI today for symptom relief.     Employment HX: teacher, currently retired.     Current Outpatient Medications:     amLODIPine (NORVASC) 10 MG tablet, Take 1 tablet (10 mg total) by mouth once daily., Disp: 90 tablet, Rfl: 0    cetirizine (ZYRTEC) 10 MG tablet, Take 1 tablet (10 mg total) by mouth once daily., Disp: 90 tablet, Rfl: 0    " "diclofenac sodium (VOLTAREN) 1 % Gel, Apply 2 g topically 4 (four) times daily as needed (pain). Do not exceed 32 grams/day: do not to exceed 8 grams/day/single joint of upper extremities; do not to exceed 16 grams/day/single joint of lower extremities.  Please request refill of this medication from your PCP., Disp: 100 g, Rfl: 3    FLUoxetine 40 MG capsule, Take 1 capsule (40 mg total) by mouth once daily., Disp: 30 capsule, Rfl: 3    polyethylene glycol (MOVIPREP) 100-7.5-2.691 gram solution, Take as directed prior to colonoscopy, Disp: 1 kit, Rfl: 0    spironolactone (ALDACTONE) 50 MG tablet, Take 50 mg by mouth once daily., Disp: , Rfl:     fluticasone propionate (FLONASE) 50 mcg/actuation nasal spray, 1 spray (50 mcg total) by Each Nostril route once daily. (Patient not taking: Reported on 6/17/2025), Disp: 16 g, Rfl: 0    vitamin D (VITAMIN D3) 1000 units Tab, Take 1,000 Units by mouth once daily. (Patient not taking: Reported on 9/17/2024), Disp: , Rfl:     Current Facility-Administered Medications:     dexAMETHasone injection 4 mg, 4 mg, Intravenous, 1 time in Clinic/HOD,     dexAMETHasone injection 4 mg, 4 mg, Intravenous, 1 time in Clinic/HOD,     LIDOcaine (PF) 10 mg/ml (1%) injection 50 mg, 5 mL, Other, 1 time in Clinic/HOD,     LIDOcaine (PF) 10 mg/ml (1%) injection 50 mg, 5 mL, Other, 1 time in Clinic/HOD,   Review of patient's allergies indicates:   Allergen Reactions    Lisinopril Swelling    Hydrocodone-acetaminophen Nausea And Vomiting     REVIEW OF SYSTEMS:  A ten-point review of systems was performed and is negative, except as mentioned above   Objective:   Body mass index is 35.43 kg/m².   Vitals:    06/17/25 0810   BP: 136/80   Pulse: 77   Resp: 19   Temp: 98.9 °F (37.2 °C)   TempSrc: Oral   SpO2: 99%   Weight: 112 kg (246 lb 14.6 oz)   Height: 5' 10" (1.778 m)   PainSc:   9   PainLoc: Knee     MSK Knee Exam  General:  no apparent distress, no pain indicators,  obesity  Inspection: lower " extremities in proportion with overall body habitus, no erythema   ,  no erythema, limping gait w/ full weight partial (cane use in clinic today)  LEFT KNEE RIGHT KNEE   [x] Swelling mild  [x] Varus deformity  [] Rash [] Contusion  [] Mass  [] Scars [x] Swelling mild  [x] Varus deformity  [] Rash [] Contusion  [] Mass  [] Scars   Palpation:     LEFT KNEE RIGHT KNEE   Joint Warmth: normal  POMT: lateral joint line    [x] Patellar grind with compression  [x] Crepitus  [] Joint effusion  [x] Quad atrophy  [] Active mechanical locking with joint movement  [] Popliteal fullness  [] Tenderness medial joint line  [x] Tenderness lateral joint line  [] Tenderness of tibial plateau   [] Tenderness of patellar tendon  [] Tenderness of quadriceps tendon  [] Tenderness of prepatellar   [] Tenderness of infrapatellar  [] Tenderness of anserine bursae  [x] Tenderness of patellar facet  [] Tenderness over lateral retinaculum Joint Warmth: normal  POMT: lateral joint line  [x] Patellar grind with compression  [x] Crepitus  [] Joint effusion  [x] Quad atrophy  [] Active mechanical locking with joint movement  [] Popliteal fullness  [] Tenderness medial joint line  [x] Tenderness lateral joint line  [] Tenderness of tibial plateau   [] Tenderness of patellar tendon  [] Tenderness of quadriceps tendon  [] Tenderness of prepatellar  [] Tenderness of infrapatellar  [] Tenderness of anserine bursae  [x] Tenderness of patellar facet  [] Tenderness over lateral retinaculum   ROM Active Flexion / Extension (0-140)  Left 2 / 105 w/ pain Right 1 / 110 w/ pain   Strength Flexion / Extension (5 / 5)  Left 4 / 5 Right 4 / 5     Special Testing:         Not  Tested IT Band Syndrome L+ L-- R+ R--    [] Iris's Test [] [x] [] [x]     Joint Effusion        [] Ballotable Effusion [] [x] [] [x]    [] Fluid Wave [] [x] [] [x]     Patellar Testing        [] Apprehension [] [x] [] [x]     Meniscal Injury        [] Boy's Test [x] [] [x] []    [x]  Thessaly's Test [] [] [] []     Ligament Injury        [] ACL Anterior Drawer [] [x] [] [x]    [] PCL Posterior Drawer [] [x] [] [x]    [] LCL Varus Test [] [x] [] [x]    [] MCL Valgus Test [] [x] [] [x]      Hip Exam normal  Ankle Exam normal  Neurovascular: Intact to light touch  Neuro/ Psych: Awake, alert, oriented, normal mood and affect  Lymphatic: No LAD  Assessment:   IMAGING:  XR noted in EMR dated 12/17/24 4 views of bilateral knee reviewed and independently interpreted by me with noted no acute findings noted, findings suggestive of arthritic changes, significant lateral joint space narrowing.  Radiologist findings reviewed.  Findings discussed with patient today.    EXAMINATION: XR KNEE COMP 4 OR MORE VIEWS LEFT 12/17/24  CLINICAL HISTORY:  Pain, unspecified  COMPARISON:  None.  FINDINGS:  No acute displaced fractures or dislocations.  There is narrowing of the lateral compartment of the knee joint with presence of marginal osteophytes degenerative changes seen also of the patellofemoral joint articular spaces are otherwise preserved with smooth articular surfaces  No blastic or lytic lesions.  Soft tissues within normal limits.  Impression:  Degenerative changes    EXAMINATION: XR KNEE COMP 4 OR MORE VIEWS RIGHT 12/17/24  CLINICAL HISTORY:  Pain, unspecified  COMPARISON:  None.  FINDINGS:  No acute displaced fractures or dislocations.  There is narrowing of the lateral compartment of the knee joint with marginal osteophytes on the medial compartment  There might be changes suggestive of a patella jairo on the standing projection there are degenerative changes of the lateral compartment of the contralateral knee articular spaces otherwise preserved with smooth articular surfaces  Soft tissues within normal limits.  Impression   Degenerative changes.  Changes suggestive of patella Katyh.     MRI/CT: none    LABS:   Hemoglobin A1c   Date Value Ref Range Status   10/18/2024 5.0 <=7.0 % Final   04/21/2023 5.1  <=7.0 % Final   02/09/2022 5.2 <=7.0      EMR REVIEW: completed with noted prior visit 3/17/25 reviewed.  Diagnosis & Treatment Plan:   DIAGNOSIS: Moderate exacerbation of chronic Bilateral R = L Severe knee arthritis Kellgren-Grady Grade 4 complicated by obesity   1. Bilateral primary osteoarthritis of knee    2. BMI 35.0-35.9,adult      TREATMENT PLAN:  Orders Placed This Encounter    Large Joint Aspiration/Injection    LIDOcaine (PF) 10 mg/ml (1%) injection 50 mg    LIDOcaine (PF) 10 mg/ml (1%) injection 50 mg    dexAMETHasone injection 4 mg    dexAMETHasone injection 4 mg     Treatment plan:  Having adequate relief with current treatment plan. Intra-articular injections recommended today due to return of symptoms since prior visit which are impacting ADLs.   Ongoing education about DX, treatment recommendations and reasonable expectations including goal to decrease pain and increase function with conservative treatments including, but limited to:  activity modification as needed, daily HEP with TheraBand, BMI reduction goal 5-10% of body weight, non-impact muscle strengthening with use of stationary bike (RPM set at 80 or > with slow progression to goal of 40 minutes 3-4 times per week as tolerated), walking-aides as needed, adequate vit D/C, glucosamine 1500 mg/day and/or daily acetaminophen 1000 mg 3 times/ day if able to tolerate.  Patient aware condition has no cure and treatment plan is focused on management of symptoms.  Procedure: CSI v. VS injections discussed, s/t failed conservative treatments patient consents to CSI today  RX Medications: continue medications as RX per PCP .  RTC:  3 months  NOTE: Conversation had with patient discussing surgical versus conservative treatment options.  At this time patient declines referral to surgeon for further eval. and surgical treatment options.  If status changes patient will up date me.       Lili Bensonsnacho [facility]  Clinic  Procedure Note:   Large Joint Aspiration/Injection: bilateral knee    Date/Time: 6/17/2025 8:20 AM    Performed by: Lili Lynn NP  Authorized by: Lili Lynn NP    Indications:  Pain and arthritis  Location:  Knee  Laterality:  Bilateral  Site:  Bilateral knee  Medications (Right):  5 mL LIDOCAINE 1% (PF) 50 mg / 5 mL; 4 mg dexAMETHasone (DECADRON) injection 4 mg/mL  Medications (Left):  4 mg dexAMETHasone (DECADRON) injection 4 mg/mL; 5 mL LIDOCAINE 1% (PF) 50 mg / 5 mL  Ortho Procedure Note Bilateral  Knee lateral suprapatellar approach CSI  Indications: Therapeutic Indication - Decrease pain, Increase range of motion and improve quality of life.  Risks:  Possible complications with the injection include bleeding, infection (.01%), tendon rupture, steroid flare, fat pad or soft tissue atrophy, skin depigmentation, allergic reaction to medications and vasovagal response. (steroid flare treatment is rest, ice, NSAIDs and resolves in 24-36 hours)  Consent:  Procedure, risks, benefits, and alternatives explained the patient, who voiced understanding and agreed to proceed with procedure.  Consent signed and scanned into the medical record.   No absolute contraindications (cellulitis overlying joint, infection, lack of informed consent, allergy to injection medication, AVN protein or egg allergy for sodium hyaluronate, or history of steroid flare) or relative contraindications (uncontrolled DM2 A1c>10, coagulopathy, INR > 3.5, previous joint replacement or history of AVN).        Staff: Lili Lynn FNP  Description:  Time-out performed.  The patient was prepped in normal sterile fashion use of chlorhexidine scrub and the appropriate and anatomic landmarks were identified.  Sterile 25g 1.5 inch needle was used. Topical ethyl chloride was applied.   Contents of syringe included: 5 ml 1% lidocaine (PF) with 4 mg dexamethasone  Drainage: n/a  Complications: None   EBL: None   Post  Procedure: Patient alert, and moving all extremities. ROM improved, pain decreased.  Good peripheral pulses, no signs of vascular compromise and range of motion intact.  Aftercare instructions were given to patient at time of discharge.  Relative rest for 3 days-avoiding excess activity.  Place ice on the area for 15 minutes every 4-6 hours. Patient may take Tylenol a 1000 mg b.i.d. or ibuprofen 600 mg t.i.d. for the next 3-4 days if not on medication already and safe to take pending co-morbidities.  Protect the area for the next 1-8 hours if anesthetic was used.  Avoid excessive activity for the next 3-4 weeks.  ER precautions given for fever, severe joint pain or allergic reaction or other new symptoms related to the joint injection.      Time Based Billing   None    *Please be aware that this note has been generated with the assistance of odal voice-to-text.  Please excuse any spelling or grammatical errors. Positive findings indicted by checkmark*

## 2025-07-03 ENCOUNTER — TELEPHONE (OUTPATIENT)
Dept: BEHAVIORAL HEALTH | Facility: CLINIC | Age: 70
End: 2025-07-03
Payer: MEDICARE

## 2025-07-08 ENCOUNTER — OFFICE VISIT (OUTPATIENT)
Dept: BEHAVIORAL HEALTH | Facility: CLINIC | Age: 70
End: 2025-07-08
Payer: MEDICAID

## 2025-07-08 VITALS
BODY MASS INDEX: 35 KG/M2 | HEIGHT: 70 IN | WEIGHT: 244.5 LBS | DIASTOLIC BLOOD PRESSURE: 75 MMHG | SYSTOLIC BLOOD PRESSURE: 105 MMHG

## 2025-07-08 DIAGNOSIS — F43.21 GRIEF: Chronic | ICD-10-CM

## 2025-07-08 DIAGNOSIS — F41.9 ANXIETY: ICD-10-CM

## 2025-07-08 DIAGNOSIS — F51.04 PSYCHOPHYSIOLOGICAL INSOMNIA: Chronic | ICD-10-CM

## 2025-07-08 DIAGNOSIS — F34.1 PERSISTENT DEPRESSIVE DISORDER: Primary | ICD-10-CM

## 2025-07-08 PROCEDURE — 1159F MED LIST DOCD IN RCRD: CPT | Mod: CPTII,,, | Performed by: NURSE PRACTITIONER

## 2025-07-08 PROCEDURE — 3074F SYST BP LT 130 MM HG: CPT | Mod: CPTII,,, | Performed by: NURSE PRACTITIONER

## 2025-07-08 PROCEDURE — 3008F BODY MASS INDEX DOCD: CPT | Mod: CPTII,,, | Performed by: NURSE PRACTITIONER

## 2025-07-08 PROCEDURE — 1160F RVW MEDS BY RX/DR IN RCRD: CPT | Mod: CPTII,,, | Performed by: NURSE PRACTITIONER

## 2025-07-08 PROCEDURE — 99213 OFFICE O/P EST LOW 20 MIN: CPT | Mod: PBBFAC,PN | Performed by: NURSE PRACTITIONER

## 2025-07-08 PROCEDURE — 3078F DIAST BP <80 MM HG: CPT | Mod: CPTII,,, | Performed by: NURSE PRACTITIONER

## 2025-07-08 PROCEDURE — 99214 OFFICE O/P EST MOD 30 MIN: CPT | Mod: S$PBB,,, | Performed by: NURSE PRACTITIONER

## 2025-07-08 RX ORDER — FLUOXETINE HYDROCHLORIDE 40 MG/1
40 CAPSULE ORAL DAILY
Qty: 30 CAPSULE | Refills: 3 | Status: SHIPPED | OUTPATIENT
Start: 2025-07-08

## 2025-07-08 RX ORDER — HYDROXYZINE PAMOATE 25 MG/1
CAPSULE ORAL
Qty: 90 CAPSULE | Refills: 3 | Status: SHIPPED | OUTPATIENT
Start: 2025-07-08

## 2025-07-08 NOTE — PROGRESS NOTES
Follow-up #16  07/08/2025  HPI: Maira Terry is a 70 y.o. female here today for medication management of   Past Medical History:     -------------------------------------    Depression    Disorder of kidney and ureter    Hypertension    Personal history of colonic polyps    Marilee Lee MD        Last visit: Patient states that she has been having headaches as soon as she takes her medication. She does not eat when she takes her medications.   She states that her body has gotten used to the Prozac 20mg a day. She would like to increase the dose to 40mg a day.     This visit:  Today patient states that her depression and anxiety are the same (not worse) but that her sleep and energy level are worse.  She states that she feels tired all the time and that she always has Angelo on her mind. She states that holidays are worse for her.     She states that her sleep is not good but she thinks that it is because of the arthritis in her knees; the pain is keeping her awake. She gets injections in her knees every 3 months.   She is willing to try a low dose of Vistaril to help with anxiety/sleep.     FU in 3 months    PHQ score:  07/08/2025:  8 mild  04/03/2025:  07/05/2024: 6 mild  04/05/2024: 12 moderate  10/05/2023: 12 moderate  04/04/2023: 9 mild  02/22/2023: 2 minimal  08/19/2022: 6 mild depression  05/19/2022: 5  04/14/2022: 3  03/14/2022: 0  02/14/2022: 6  10/22/2021: 7  07/21/2021: 0  05/21/2021: 0  04/272021: 3    OCTAVIO:   07/08/2025:  2 normal  04/03/2025:  07/05/2024: 1 normal  04/05/2024: 7 mild  10/05/2023: 5 mild  04/04/2023: between 7-8  02/22/2023:  08/19/2022: 7  - mild anxiety  05/19/2022: 7    Mental Status Evaluation:  Appearance:  unremarkable, age appropriate   Behavior:  normal, cooperative   Speech:  no latency; no press   Mood:  steady   Affect:  congruent and appropriate   Thought Process:  normal and logical   Thought Content:  normal, no suicidality, no homicidality, delusions, or paranoia    Sensorium:  grossly intact   Cognition:  grossly intact   Insight:  intact   Judgment:  behavior is adequate to circumstances     Impression:      ICD-10-CM ICD-9-CM   1. Persistent depressive disorder  F34.1 300.4   2. Grief  F43.21 309.0   3. Psychophysiological insomnia  F51.04 307.42        Plan:  1. Continue Prozac to 40mg a day    2. Start Vistaril 25-50mg at bedtime for insomnia and/or anxiety  3. Exercise daily - walking    No need for PEC as pt is not an imminent danger to self or others or gravely disabled due to acute psychiatric illness     Discussed that pt should either call clinic for psychiatric crisis symptoms or present to nearest emergency room     Discussed with patient informed consent including diagnosis, risks and benefits of proposed treatment above vs. alternative treatments vs. no treatment, as well as serious and common side effects of these treatments, and the inherent unpredictability of individual responses to these treatments. The patient expresses understanding of the above and displays the capacity to agree with this current plan. Patient also agrees that, currently, the benefits outweigh the risks and would like to pursue treatment at this time, and had no other questions.     Instructions:  Take all medications as prescribed.    2. Abstain from recreational drugs and alcohol.  3. Present to ED or call 911 for SI/HI plan or intent, psychosis, or medical emergency.    Follow-up  Follow up in about 3 months (around 10/8/2025) for medication management, face to face.      Follow-up #15  04/03/2025  HPI: 70yo F referred by PCP to the [facility] for depression/grief.  PMHx: R knee pain, HTN, joint pain, glaucoma, asa, depression    Last visit: patient states that the Prozac 80mg may have been too much; it caused tremors. She reduced the dose to 40mg a day and is doing well. She wants to continue to reduce the dose.   Reduced Prozac to 20mg a day. She still has some 40mg capsules  "left and she will complete those before reducing to 20mg a day.     This visit: Patient states that she has been having headaches as soon as she takes her medication. She does not eat when she takes her medications.   She states that her body has gotten used to the Prozac 20mg a day. She would like to increase the dose to 40mg a day.     FU in 3 months    PHQ score:  04/03/2025:  07/05/2024: 6 mild  04/05/2024: 12 moderate  10/05/2023: 12 moderate  04/04/2023: 9 mild  02/22/2023: 2 minimal  08/19/2022: 6 mild depression  05/19/2022: 5  04/14/2022: 3  03/14/2022: 0  02/14/2022: 6  10/22/2021: 7  07/21/2021: 0  05/21/2021: 0  04/272021: 3    OCTAVIO:   04/03/2025:  07/05/2024: 1 normal  04/05/2024: 7 mild  10/05/2023: 5 mild  04/04/2023: between 7-8  02/22/2023:  08/19/2022: 7  - mild anxiety  05/19/2022: 7    Mental Status Evaluation:  Appearance:  Well groomed   Behavior:  friendly and cooperative   Speech:  no latency; no press   Mood:  euthymic   Affect:  congruent   Thought Process:  normal and logical   Thought Content:  normal, no suicidality, no homicidality, delusions, or paranoia   Sensorium:  grossly intact   Cognition:  grossly intact   Insight:  intact   Judgment:  behavior is adequate to circumstances     Impression:  1. Depression  2. Grief - resolving    Plan:  1. Decrease Prozac to 40mg a day and then to 20mg q day  2. Exercise daily - walking  3. FU in 3 months            Follow-up #14  07/05/2024  HPI: 70yo F referred by PCP to the [facility] for depression/grief.  PMHx: R knee pain, HTN, joint pain, glaucoma, asa, depression    On her last visit, patient felt that she was "used" to the Prozac and requested an increase. Prozac was increased to 80mg a day. Encouraged patient to look into [facility]. She stated that she wanted to see if the medication works first.     Today, patient states that the Prozac 80mg may have been too much; it caused tremors. She reduced the dose to 40mg a day and is doing " well. She wants to continue to reduce the dose.   Reduced Prozac to 20mg a day. She still has some 40mg capsules left and she will complete those before reducing to 20mg a day.     FU in 3 months    PHQ score:  07/05/2024: 6 mild  04/05/2024: 12 moderate  10/05/2023: 12 moderate  04/04/2023: 9 mild  02/22/2023: 2 minimal  08/19/2022: 6 mild depression  05/19/2022: 5  04/14/2022: 3  03/14/2022: 0  02/14/2022: 6  10/22/2021: 7  07/21/2021: 0  05/21/2021: 0  04/272021: 3    OCTAVIO:   07/05/2024: 1 normal  04/05/2024: 7 mild  10/05/2023: 5 mild  04/04/2023: between 7-8  02/22/2023:  08/19/2022: 7  - mild anxiety  05/19/2022: 7    Mental Status Evaluation:  Appearance:  Well groomed   Behavior:  friendly and cooperative   Speech:  no latency; no press   Mood:  euthymic   Affect:  congruent   Thought Process:  normal and logical   Thought Content:  normal, no suicidality, no homicidality, delusions, or paranoia   Sensorium:  grossly intact   Cognition:  grossly intact   Insight:  intact   Judgment:  behavior is adequate to circumstances     Impression:  1. Depression  2. Grief - resolving    Plan:  1. Decrease Prozac to 40mg a day and then to 20mg q day  2. Exercise daily - walking  3. FU in 3 months      Follow-up #13  04/05/2024  HPI: 69yo F referred by PCP to the [facility] for depression/grief.  PMHx: R knee pain, HTN, joint pain, glaucoma, asa, depression    Today, patient is bright but she states that Angelo has been on her mind at bedtime lately.   She is that she has been having dreams about Angelo but that they are good dreams. Again, Angelo is happy and they have good conversations.     Patient seems ambiguous. Cannot tell if patient is simply reporting how she is doing or if her thoughts are a problem.   Patient finally said that she feels that her body is used to the Prozac.   Will increase the Prozac to 80mg a day.     Encouraged patient to look into [facility]. She states that she wants to see if the  "medication works first.     FU in 3 months    PHQ score:  04/05/2024: 12 moderate  10/05/2023: 12 moderate  04/04/2023: 9 mild  02/22/2023: 2 minimal  08/19/2022: 6 mild depression  05/19/2022: 5  04/14/2022: 3  03/14/2022: 0  02/14/2022: 6  10/22/2021: 7  07/21/2021: 0  05/21/2021: 0  04/272021: 3    OCTAVIO:   04/05/2024: 7 mild  10/05/2023: 5 mild  04/04/2023: between 7-8  02/22/2023:  08/19/2022: 7  - mild anxiety  05/19/2022: 7    Mental Status Evaluation:  Appearance:  Well groomed   Behavior:  friendly and cooperative   Speech:  no latency; no press   Mood:  euthymic   Affect:  congruent   Thought Process:  normal and logical   Thought Content:  normal, no suicidality, no homicidality, delusions, or paranoia   Sensorium:  grossly intact   Cognition:  grossly intact   Insight:  intact   Judgment:  behavior is adequate to circumstances     Impression:  1. Depression  2. Grief - resolving    Plan:  1. Increase Prozac to 80mg q day  2. Exercise daily - walking  3. FU in 3 months - around 7/5/2024      Follow-up #12  10/05/2023  HPI: 69yo F referred by PCP to the [facility] for depression/grief.  PMHx: R knee pain, HTN, joint pain, glaucoma, asa, depression    Today, patient is bright.   She states that she has been having dreams about Angelo. They are good dreams. Angelo is happy and they have good conversations.     She states that her legs/knees have been giving out on her and she has been getting injections that have been helpful. She may need surgery in the future.     She was taking Lipitor and was making her feel like she was "closed in" and she stopped taking it.     She is still taking Prozac 60mg a day. She does not feel the need for any changes.       PHQ score:  10/05/2023: 12 moderate  04/04/2023: 9 mild  02/22/2023: 2 minimal  08/19/2022: 6 mild depression  05/19/2022: 5  04/14/2022: 3  03/14/2022: 0  02/14/2022: 6  10/22/2021: 7  07/21/2021: 0  05/21/2021: 0  04/272021: 3    OCTAVIO:   10/05/2023: 5 " mild  04/04/2023: between 7-8  02/22/2023:  08/19/2022: 7  - mild anxiety  05/19/2022: 7    Mental Status Evaluation:  Appearance:  Not assessed; telephone visit   Behavior:  friendly and cooperative   Speech:  no latency; no press   Mood:  euthymic   Affect:  Not assessed; telephone visit   Thought Process:  normal and logical   Thought Content:  normal, no suicidality, no homicidality, delusions, or paranoia   Sensorium:  grossly intact   Cognition:  grossly intact   Insight:  intact   Judgment:  behavior is adequate to circumstances     Impression:  1. Depression  2. Grief - resolving    Plan:  1. Continue Prozac to 60mg q day.  2. Exercise daily - walking  3. FU in 6 months - virtual    Follow-up #11  [id number]  HPI: 68yo F referred by PCP to the [facility] for depression/grief.  PMHx: R knee pain, HTN, joint pain, glaucoma, asa, depression    Today, patient states that she is feeling better since the Prozac was increased to 60mg.     She is still losing some weight which is good because she is having knee problems. She is going to PT.     This month will be the third anniversary of Angelo's death.   States that her grief is getting better.   She says that when she and her other daughter get together, they have a hard time moving forward.     She is sleeping well at night.     Continue Prozac 60mg q day  FU in 6 months.     PHQ score:  04/04/2023: 9 mild  02/22/2023: 2 minimal  08/19/2022: 6 mild depression  05/19/2022: 5  04/14/2022: 3  03/14/2022: 0  02/14/2022: 6  10/22/2021: 7  07/21/2021: 0  05/21/2021: 0  04/272021: 3    OCTAVIO:   04/04/2023: between 7-8 02/22/2023:  08/19/2022: 7  - mild anxiety  05/19/2022: 7    Mental Status Evaluation:  Appearance:  Not assessed; telephone visit   Behavior:  friendly and cooperative   Speech:  no latency; no press   Mood:  euthymic   Affect:  Not assessed; telephone visit   Thought Process:  normal and logical   Thought Content:  normal, no suicidality, no  homicidality, delusions, or paranoia   Sensorium:  grossly intact   Cognition:  grossly intact   Insight:  intact   Judgment:  behavior is adequate to circumstances     Impression:  1. Depression  2. Grief - resolving    Plan:  1. Continue Prozac to 60mg q day.  2. Exercise daily - walking  3. FU in 6 months       Follow-up #10  2023  HPI: 66yo F referred by PCP to the [facility] for depression/grief.  PMHx: R knee pain, HTN, joint pain, glaucoma, asa, depression    On her last visit, patient stated that she was still grieving the loss of her daughter, Angelo, but not as bad.   No med changes were made.     Today, patient returns and states that she is doing well.   She has lost 30lbs since the death of her daughter in 2020.   But, she is having trouble with her L knee. She has been to PT but it is not helping. She may have to have surgery.     States that she went to a  last week and it brought back a lot of memories.     States that she does not have any energy.   When she first started Prozac, she had energy.   She is still taking Prozac 40mg q day.  Will increase the Prozac to 60mg q day.    FU in 6 weeks    PHQ score:  2023: 2 minimal  2022: 6 mild depression  2022: 5  2022: 3  2022: 0  2022: 6  10/22/2021: 7  2021: 0  2021: 0  : 3    OCTAVIO:   2023:  2022: 7  - mild anxiety  2022: 7    Mental Status Evaluation:  Appearance:  Not assessed; telephone visit   Behavior:  friendly and cooperative   Speech:  no latency; no press   Mood:  euthymic   Affect:  Not assessed; telephone visit   Thought Process:  normal and logical   Thought Content:  normal, no suicidality, no homicidality, delusions, or paranoia   Sensorium:  grossly intact   Cognition:  grossly intact   Insight:  intact   Judgment:  behavior is adequate to circumstances     Impression:  1. Depression  2. Grief - resolving    Plan:  1. Increase Prozac to 60mg  q day.  2. Exercise daily - walking  3. FU in 2 months       Follow-up #9  08/19/2022 (telemed)  HPI: 68yo F referred by PCP to the [facility] for depression/grief.  PMHx: R knee pain, HTN, joint pain, glaucoma, asa, depression    Patient states that she has been hanging in there.   She caught COVID recently and is hoarse. Has been coughing a lot.     She has been falling a lot and is now going to therapy.     She is still taking Prozac 40mg q day.   She is feeling better than she has been.   She is still grieving but not as hard.     FU in 6 months    PHQ score:  08/19/2022: 6 - mild depression  05/19/2022: 5  04/14/2022: 3  03/14/2022: 0  02/14/2022: 6  10/22/2021: 7  07/21/2021: 0  05/21/2021: 0  04/272021: 3    OCTAVIO:   08/19/2022: 7  - mild anxiety  05/19/2022: 7    Mental Status Evaluation:  Appearance:  Not assessed; telephone visit   Behavior:  friendly and cooperative   Speech:  no latency; no press   Mood:  euthymic   Affect:  Not assessed; telephone visit   Thought Process:  normal and logical   Thought Content:  normal, no suicidality, no homicidality, delusions, or paranoia   Sensorium:  grossly intact   Cognition:  grossly intact   Insight:  intact   Judgment:  behavior is adequate to circumstances     Impression:  1. Depression  2. Grief - resolving  Plan:  1. Continue Prozac 40mg q day  2. Exercise daily - walking  3. FU in 2 months - telemed    Follow-up #8  05/19/2022 (telemed)  HPI: 65yo F referred by PCP to the [facility] for depression/grief.  PMHx: R knee pain, HTN, joint pain, glaucoma, asa, depression     Today, patient states that she is hanging in there.   States that she still rushes around doing tasks; she forgets that Angelo is not there.   Her grief is getting better.   She feels that the medication has been helpful.   Feels that she may want to try to ween down in the near future; after her colonoscopy in July.      Will continue Prozac 40mg q day  FU in 2 months - telemed    "  PHQ score:  05/19/2022: 5  04/14/2022: 3  03/14/2022: 0  02/14/2022: 6  10/22/2021: 7  07/21/2021: 0  05/21/2021: 0  04/272021: 3  OCTAVIO:   05/19/2022: 7  MSE:   Appearance: not examined - telemed  Level of Consciousness: AAOx4  Behavior/Cooperation: normal, cooperative  Psychomotor: not examined  Speech: normal tone, normal rate, normal pitch, normal volume  Language: english, fluid  Orientation: grossly intact  Attention Span/Concentration: intact  Memory: Grossly intact  Mood: "neutral"  Affect: not examined  Thought Process: linear, normal and logical  Associations: normal and logical  Thought Content: normal, no suicidality, no homicidality, delusions, or paranoia  Fund of Knowledge: Intact  Insight: good  Judgment: good  Impression:  1. Depression  2. Grief - resolving  Plan:  1. Continue Prozac 40mg q day  2. Exercise daily - walking  3. FU in 2 months - telemed        Follow-up #7 04/14/2022 (telemed)  HPI: 65yo F referred by PCP to the [facility] for depression/grief.  PMHx: R knee pain, HTN, joint pain, glaucoma, asa, depression  On her last visit, patient was taking Prozac 40mg q day and was doing well. Her grief was resolving. She was interested in maybe continuing to decrease the Prozac.Today, patient states that she is "hanging in there."  She has had her colonoscopy but has to go back.  She will stay on the Prozac 40mg q day.  FUin 3 months - telemed  PHQ score:  04/14/2022: 3  03/14/2022: 0  02/14/2022: 6  10/22/2021: 7  07/21/2021: 0  05/21/2021: 0  04/272021: 3  Impression:  1. Depression  2. Grief - resolving  Plan:  1. Continue Prozac 40mg q day  2. Exercise daily - walking  3. FU in 3 months - telemed    Follow-up #6 03/14/2022 (telemed)  HPI: 65yo F referred by PCP to the [facility] for depression/grief.  PMHx: R knee pain, HTN, joint pain, glaucoma, asa, depression  On her last visit, patient stated that she was doing well. States that her grief over the loss of her daughter is " getting better. Sleeping well. Appetite is good. She is losing some weight r/t changes in diet and exercise. Wanted to decrease the Prozac to 40mg q day.  She states that she is doing great on the Prozac 40mg q day. She wants to wait until after her colonoscopy at the end of this month to reduce the Prozac further.  She has been visiting the [facility] site and is not crying anymore. The grief is not as intense. She is able to talk about her daughter without falling apart. Other people can talk to her about her daughter and she can respond.  Will continue the Prozac at 40mg q day and will re-evaluate in one month.  PHQ score:  03/14/2022: 0  SADPERSONS score:  03/14/2022: NA  AIMS:  NA  Impression:  1. Depression  2. Grief   Plan:  1. Continue Prozac to 40mg q day  2. Exercise daily - walking  3. FU in 1 month- telemed    Follow-up #5 02/14/2022 (telemed)  HPI: 67yo F referred by PCP to the [facility] for depression/grief.  PMHx: R knee pain, HTN, joint pain, glaucoma, asa, depression  On her last visit (telemed) patient stated that she was doing much better; out of the bed and walking around the house; not going to the graveyard everyday. The increase in Prozac had been helpful. Had not sought grief counseling because she was feeling better.  Sleeping at night. Appetite was good.  Today, patient states that she is doing well. States that she is hanging in there.  She lost her brother in January. He was the last of her 4 brothers. States that she is doing OK.  States that her grief over the loss of her daughter is getting better.  Sleeping well. Appetite is good. She is losing some weight r/t changes in diet and exercise.  She is interested in decreasing the Prozac to 40mg q day.  Will FU in one month - telemed.  PHQ score:  02/14/2022:  Feeling Down, Depressed, Hopeless: Several days  Little Interest - Pleasure in Activities: Several days  Initial Depression Screen Score: 2  Trouble Falling or Staying Asleep:  "Several days  Feeling Tired or Little Energy: Several days  Poor Appetite or Overeating: Several days  Feeling Bad About Yourself: Several days  Trouble Concentrating: Not at all  Moving or Speaking Slowly: Not at all  Thoughts Better Off Dead or Hurting Self: Not at all  Detailed Depression Screen Score: 4  Total Depression Screen Score: 6  10/22/2021: 7  07/21/2021: 0  05/21/2021: 0  04/272021: 3  SADPERSONS score:  10/22/2021: NA  07/21/2021: NA  05/21/2021: NA  04/27/2021: NA  AIMS:  NA  Impression:  1. Depression  2. Grief   Plan:  1. Decrease Prozac to 40mg q day  2. Exercise daily - walking  3. FU in 1 month- telemed    Follow-up #4 11/12/2021 (telemed)  HPI: 65yo F referred by PCP to the [facility] for depression/grief.  PMHx: R knee pain, HTN, joint pain, glaucoma, asa, depression  On her last visit (telemed) patient stated that she was "getting back in bed again;" did not have as much energy as she did a few months ago. Denies crying. Her daughter (Angelo) birthday was on the 9th and that's when she started getting down. This was the second year that she has not been able to celebrate her birthday. Suggested that she go to [facility] on Sunday afternoons at [facility] but she did not want to miss her own Buddhism but that she would look into some grief counseling. Dr. Lewis took her off of Doxepin because of her kidney disease. She was sleeping at night. Was going to start exercising. Prozac increased to 50mg q day.  Today, patient states that she is doing much better. She is out of the bed and walking around the house. She is not going to the graveyard everyday like she was. The increase in Prozac has been helpful.  She has not looked for grief counseling because she was feeling better. She is reading a book about the loss of child. She is writing about Angelo.  She is sleeping at night.  Her appetite is good. She is watching what she eats. Eating fruit.  She has started walking and moving " "around the house. She has more energy.  No medication changes.  FU in 3 months.  Clinical Global Impression  1. Depression  2. Grief   Plan:  1. Continue Prozac to 50mg q day  2. Exercise daily - walking  3. FU in 3 months - telemed      Follow-up #3 10/22/2021  HPI: 66yo F referred by PCP to the [facility] for depression/grief.  PMHx: R knee pain, HTN, joint pain, glaucoma, asa, depression  On her last visit, patient stated that she had lost 12lbs and had increased energy; was not crying as much and was sleeping at night.  Today, patient states that she is getting back in bed again. She does not have as much energy as she did a few months ago. She denies crying again. Her daughters birthday was on the 9th and that's when she started getting down. This the second year that she has not been able to celebrate her birthday.  Feels like she still has not "let go" of her daughter, Angelo Raymond. She still looks for her in the bed; every time she leaves the house, she feels that she has to rush back home to take care of Angelo. She finds herself buying things for her daughter such as yogurt.  Suggested that she go to [facility] on Sunday afternoons at [facility]. She does not want to miss her own Buddhism.  Dr. Lewis took her off of Doxepin because of her kidney disease.  States that she is sleeping at night.  States that she will look into some grief counseling.  States she is trying to start walking daily. Encouraged her to walk daily for both her physical and mental/emotional health.  Will increase Prozac to 50mg q day.  FU in 3 weeks - telemed  PHQ score:  10/22/2021: 7  07/21/2021: 0  05/21/2021: 0  04/272021: 3  SADPERSONS score:  10/22/2021: NA  07/21/2021: NA  05/21/2021: NA  04/27/2021: NA  Impression:  1. Depression  2. Grief  Plan:  1. Will increase Prozac to 50mg q day  2. Exercise daily - walking  3. Consider [facility]  4. FU in 3 weeks - telemed    Follow-up #2 07/21/2021  HPI: 66yo F " "referred by PCP to the [facility] for depression/grief.  PMHx: R knee pain, HTN, joint pain, glaucoma, asa, depression  Today, patient states that she has lost 12 lbs. She attributes that to the Prozac. States that she had energy. She is doing things. She is not crying as much; not grieving as hard. She can talk about Angelo without crying.  She is sleeping well at night.  PHQ score:  07/21/2021: 0  SADPERSONS score:  07/21/2021: NA  Plan:  1. Continue Prozac 40mg q day  2. FU in 3 months    Follow-up #1 05/21/2021  HPI: 64yo F referred by PCP to the [facility] for depression/grief.  PMHx: R knee pain, HTN, joint pain, glaucoma, asa, depression  Today, patient states that the medication change has made a big difference. States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

## 2025-07-08 NOTE — PATIENT INSTRUCTIONS
Plan:  1. Continue Prozac 40mg a day   2. Start Vistaril 25-50mg at bedtime for insomnia and/or anxiety  3. Exercise daily - walking

## 2025-07-30 DIAGNOSIS — I10 PRIMARY HYPERTENSION: ICD-10-CM

## 2025-07-31 RX ORDER — AMLODIPINE BESYLATE 10 MG/1
10 TABLET ORAL DAILY
Qty: 90 TABLET | Refills: 0 | Status: SHIPPED | OUTPATIENT
Start: 2025-07-31 | End: 2025-10-29

## 2025-08-06 ENCOUNTER — LAB VISIT (OUTPATIENT)
Dept: LAB | Facility: HOSPITAL | Age: 70
End: 2025-08-06
Payer: MEDICARE

## 2025-08-06 ENCOUNTER — OFFICE VISIT (OUTPATIENT)
Dept: INTERNAL MEDICINE | Facility: CLINIC | Age: 70
End: 2025-08-06
Payer: MEDICARE

## 2025-08-06 VITALS
DIASTOLIC BLOOD PRESSURE: 80 MMHG | HEIGHT: 70 IN | OXYGEN SATURATION: 100 % | BODY MASS INDEX: 34.65 KG/M2 | RESPIRATION RATE: 20 BRPM | WEIGHT: 242 LBS | HEART RATE: 65 BPM | TEMPERATURE: 98 F | SYSTOLIC BLOOD PRESSURE: 124 MMHG

## 2025-08-06 DIAGNOSIS — I10 PRIMARY HYPERTENSION: ICD-10-CM

## 2025-08-06 DIAGNOSIS — Z00.00 WELLNESS EXAMINATION: ICD-10-CM

## 2025-08-06 DIAGNOSIS — I10 PRIMARY HYPERTENSION: Primary | ICD-10-CM

## 2025-08-06 DIAGNOSIS — F34.1 PERSISTENT DEPRESSIVE DISORDER: ICD-10-CM

## 2025-08-06 DIAGNOSIS — E55.9 VITAMIN D DEFICIENCY: ICD-10-CM

## 2025-08-06 DIAGNOSIS — R09.82 POSTNASAL DRIP: ICD-10-CM

## 2025-08-06 DIAGNOSIS — Z12.11 SCREEN FOR COLON CANCER: ICD-10-CM

## 2025-08-06 DIAGNOSIS — E83.52 HYPERCALCEMIA: Primary | ICD-10-CM

## 2025-08-06 DIAGNOSIS — Z12.31 ENCOUNTER FOR SCREENING MAMMOGRAM FOR BREAST CANCER: ICD-10-CM

## 2025-08-06 DIAGNOSIS — M17.0 BILATERAL PRIMARY OSTEOARTHRITIS OF KNEE: ICD-10-CM

## 2025-08-06 DIAGNOSIS — M17.9 OSTEOARTHRITIS OF KNEE, UNSPECIFIED LATERALITY, UNSPECIFIED OSTEOARTHRITIS TYPE: ICD-10-CM

## 2025-08-06 LAB
25(OH)D3+25(OH)D2 SERPL-MCNC: 28 NG/ML (ref 30–80)
ALBUMIN SERPL-MCNC: 3.8 G/DL (ref 3.4–4.8)
ALBUMIN/GLOB SERPL: 0.9 RATIO (ref 1.1–2)
ALP SERPL-CCNC: 145 UNIT/L (ref 40–150)
ALT SERPL-CCNC: 9 UNIT/L (ref 0–55)
ANION GAP SERPL CALC-SCNC: 7 MEQ/L
AST SERPL-CCNC: 14 UNIT/L (ref 11–45)
BASOPHILS # BLD AUTO: 0.06 X10(3)/MCL
BASOPHILS NFR BLD AUTO: 0.6 %
BILIRUB SERPL-MCNC: 0.5 MG/DL
BUN SERPL-MCNC: 19.5 MG/DL (ref 9.8–20.1)
CALCIUM SERPL-MCNC: 11.2 MG/DL (ref 8.4–10.2)
CHLORIDE SERPL-SCNC: 108 MMOL/L (ref 98–107)
CHOLEST SERPL-MCNC: 239 MG/DL
CHOLEST/HDLC SERPL: 4 {RATIO} (ref 0–5)
CO2 SERPL-SCNC: 25 MMOL/L (ref 23–31)
CREAT SERPL-MCNC: 1.22 MG/DL (ref 0.55–1.02)
CREAT/UREA NIT SERPL: 16
EOSINOPHIL # BLD AUTO: 0.2 X10(3)/MCL (ref 0–0.9)
EOSINOPHIL NFR BLD AUTO: 2 %
ERYTHROCYTE [DISTWIDTH] IN BLOOD BY AUTOMATED COUNT: 12 % (ref 11.5–17)
EST. AVERAGE GLUCOSE BLD GHB EST-MCNC: 105.4 MG/DL
GFR SERPLBLD CREATININE-BSD FMLA CKD-EPI: 48 ML/MIN/1.73/M2
GLOBULIN SER-MCNC: 4.3 GM/DL (ref 2.4–3.5)
GLUCOSE SERPL-MCNC: 109 MG/DL (ref 82–115)
HBA1C MFR BLD: 5.3 %
HCT VFR BLD AUTO: 44.4 % (ref 37–47)
HDLC SERPL-MCNC: 65 MG/DL (ref 35–60)
HGB BLD-MCNC: 13.7 G/DL (ref 12–16)
HIV 1+2 AB+HIV1 P24 AG SERPL QL IA: NONREACTIVE
IMM GRANULOCYTES # BLD AUTO: 0.04 X10(3)/MCL (ref 0–0.04)
IMM GRANULOCYTES NFR BLD AUTO: 0.4 %
LDLC SERPL CALC-MCNC: 158 MG/DL (ref 50–140)
LYMPHOCYTES # BLD AUTO: 2.05 X10(3)/MCL (ref 0.6–4.6)
LYMPHOCYTES NFR BLD AUTO: 20 %
MCH RBC QN AUTO: 29.7 PG (ref 27–31)
MCHC RBC AUTO-ENTMCNC: 30.9 G/DL (ref 33–36)
MCV RBC AUTO: 96.1 FL (ref 80–94)
MONOCYTES # BLD AUTO: 0.78 X10(3)/MCL (ref 0.1–1.3)
MONOCYTES NFR BLD AUTO: 7.6 %
NEUTROPHILS # BLD AUTO: 7.12 X10(3)/MCL (ref 2.1–9.2)
NEUTROPHILS NFR BLD AUTO: 69.4 %
NRBC BLD AUTO-RTO: 0 %
PLATELET # BLD AUTO: 355 X10(3)/MCL (ref 130–400)
PMV BLD AUTO: 9.7 FL (ref 7.4–10.4)
POTASSIUM SERPL-SCNC: 3.9 MMOL/L (ref 3.5–5.1)
PROT SERPL-MCNC: 8.1 GM/DL (ref 5.8–7.6)
RBC # BLD AUTO: 4.62 X10(6)/MCL (ref 4.2–5.4)
SODIUM SERPL-SCNC: 140 MMOL/L (ref 136–145)
TRIGL SERPL-MCNC: 82 MG/DL (ref 37–140)
TSH SERPL-ACNC: 1.72 UIU/ML (ref 0.35–4.94)
VLDLC SERPL CALC-MCNC: 16 MG/DL
WBC # BLD AUTO: 10.25 X10(3)/MCL (ref 4.5–11.5)

## 2025-08-06 PROCEDURE — 80061 LIPID PANEL: CPT

## 2025-08-06 PROCEDURE — 87389 HIV-1 AG W/HIV-1&-2 AB AG IA: CPT

## 2025-08-06 PROCEDURE — 84443 ASSAY THYROID STIM HORMONE: CPT

## 2025-08-06 PROCEDURE — 83036 HEMOGLOBIN GLYCOSYLATED A1C: CPT

## 2025-08-06 PROCEDURE — 80053 COMPREHEN METABOLIC PANEL: CPT

## 2025-08-06 PROCEDURE — 99214 OFFICE O/P EST MOD 30 MIN: CPT | Mod: PBBFAC

## 2025-08-06 PROCEDURE — 85025 COMPLETE CBC W/AUTO DIFF WBC: CPT

## 2025-08-06 PROCEDURE — 36415 COLL VENOUS BLD VENIPUNCTURE: CPT

## 2025-08-06 PROCEDURE — 82306 VITAMIN D 25 HYDROXY: CPT

## 2025-08-06 RX ORDER — CHOLECALCIFEROL (VITAMIN D3) 25 MCG
1000 TABLET ORAL DAILY
Qty: 90 TABLET | Refills: 1 | Status: SHIPPED | OUTPATIENT
Start: 2025-08-06

## 2025-08-06 RX ORDER — CETIRIZINE HYDROCHLORIDE 10 MG/1
10 TABLET ORAL DAILY
Qty: 90 TABLET | Refills: 1 | Status: SHIPPED | OUTPATIENT
Start: 2025-08-06

## 2025-08-06 RX ORDER — AMLODIPINE BESYLATE 10 MG/1
10 TABLET ORAL DAILY
Qty: 90 TABLET | Refills: 1 | Status: SHIPPED | OUTPATIENT
Start: 2025-08-06 | End: 2026-02-02

## 2025-08-06 RX ORDER — SPIRONOLACTONE 50 MG/1
50 TABLET, FILM COATED ORAL DAILY
Qty: 90 TABLET | Refills: 1 | Status: SHIPPED | OUTPATIENT
Start: 2025-08-06

## 2025-08-06 RX ORDER — DICLOFENAC SODIUM 10 MG/G
2 GEL TOPICAL 4 TIMES DAILY PRN
Qty: 100 G | Refills: 3 | Status: SHIPPED | OUTPATIENT
Start: 2025-08-06 | End: 2026-07-07

## 2025-08-06 RX ORDER — ACETAMINOPHEN 500 MG
500 TABLET ORAL EVERY 6 HOURS PRN
COMMUNITY

## 2025-08-06 NOTE — PROGRESS NOTES
I have seen the patient and reviewed the notes, assessments, and management plan performed by resident, I concur with his documentation of Maira Terry.  Date of Service: 8/6/2025

## 2025-08-06 NOTE — PROGRESS NOTES
[facility] Internal Medicine Clinic Note  [facility]     CC: establish care    HISTORY:  Maira Terry is a 70 y.o. female with PMHx of HTN, persistent depression, insomnia, OA of b/l knees, tubo villous adenoma  presents to establish care.    FH: colon cancer - older brother -  at around 60; our patient had C-scope done  with polyps in sigmoid and upper rectum - path sessile serrated and tubulovillous adenoma with small foci of high grade dysplasia.     Patient doing well overall. C/o chronic knee pain and has been following Ortho. Denies any chest pain, SOB, abd pain, N/V, fever, chills, cough, or focal weakness. Appetite is good.     Her depression has been stable. Cites that it started after her youngest daughter passed away from Spina bifida about 5 years ago.      Patient Care Team:  Uriel Fernandez MD as PCP - General (Internal Medicine)    PMHx:   Problem List[1]      Surgical Hx:  Past Surgical History:   Procedure Laterality Date    CHOLECYSTECTOMY      COLONOSCOPY  2022    Marilee Lee MD    HYSTERECTOMY      TONSILLECTOMY           Family Hx:  Family History   Problem Relation Name Age of Onset    Hypertension Mother      Heart attack Mother      No Known Problems Father      Heart attack Brother      Colon cancer Brother           Social Hx:   Alcohol: None  Drugs: none  Tobacco: none  Work: retired teacher  Living situation (home/apartment /homeless etc).: lives at home  Sexual Hx: not active    Allergies:   Review of patient's allergies indicates:   Allergen Reactions    Lisinopril Swelling    Hydrocodone-acetaminophen Nausea And Vomiting         MEDICATIONS:  Current Outpatient Medications   Medication Instructions    acetaminophen (TYLENOL) 500 mg, Every 6 hours PRN    amLODIPine (NORVASC) 10 mg, Oral, Daily    cetirizine (ZYRTEC) 10 mg, Oral, Daily    diclofenac sodium (VOLTAREN) 2 g, Topical (Top), 4 times daily PRN, Do not exceed 32  "grams/day: do not to exceed 8 grams/day/single joint of upper extremities; do not to exceed 16 grams/day/single joint of lower extremities.  P    FLUoxetine 40 mg, Oral, Daily    fluticasone propionate (FLONASE) 50 mcg, Each Nostril, Daily    hydrOXYzine pamoate (VISTARIL) 25 MG Cap May take 25-50mg as needed for anxiety and/or insomnia    polyethylene glycol (MOVIPREP) 100-7.5-2.691 gram solution Take as directed prior to colonoscopy    spironolactone (ALDACTONE) 50 mg, Oral, Daily    vitamin D (VITAMIN D3) 1,000 Units, Oral, Daily      PHYSICAL EXAM:    Vital Signs:  /80 (BP Location: Right arm, Patient Position: Sitting)   Pulse 65   Temp 97.8 °F (36.6 °C) (Oral)   Resp 20   Ht 5' 10" (1.778 m)   Wt 109.8 kg (242 lb)   SpO2 100%   BMI 34.72 kg/m²     Physical Exam    Constitutional: Appears stated age, resting comfortably, in no acute distress   HEENT: Nomocephalic, Atraumatic, conjunctiva normal, No scleral icterus, EOMI, PERRL  Neck: Supple, no thyromegaly   CV: RRR, no murmurs   Resp: CTAB. No wheezes or crackles. Not in respiratory distress. On RA  GI: Soft, non-distended, non-tender   : No kim in place   Skin: Warm, dry, intact   Extremities: No edema. 2+ RA, 2+ DP,  2+ PT   Neurologic: Alert and oriented x3. CN 2-12 intact   Lymphatics: No cervical, clavicular, axillary adenopathy     Labs:  No recent labs.    Imaging:  No recent imaging    Assessment & Plan:    HTN  - well controlled  - continue with Amlodipine and Aldactone once daily    Persistent depression  Insomnia  - continue with Fluxoetine 40 mg daily  - Continue with Vistaril 25 mg as needed  - follows behavioral health    OA of b/l knees  - s/p intraarticular steroid injections  - follows Ortho   - recommend Voltaren gel topically    Class 3 Obesity  - educated on lifestyle modifications and rec exercise at least 30 minutes for 3 times a week    Bilateral Cataract   - has cataract surgery planned  - patient not on any blood " thinner or DAPT  - RCRI score of 0  - patient is low risk for any major cardiac event.    Health Maintenance  Vaccinations  Immunization History   Administered Date(s) Administered    COVID-19 MRNA, LN-S PF (MODERNA HALF 0.25 ML DOSE) 11/30/2021    COVID-19, MRNA, LN-S, PF (MODERNA FULL 0.5 ML DOSE) 03/03/2021, 03/31/2021    Influenza 01/11/2013    Influenza (FLUAD) - Quadrivalent - Adjuvanted - PF *Preferred* (65+) 02/09/2022, 10/23/2023    Influenza - Quadrivalent 09/30/2019, 12/09/2020    Influenza - Trivalent - Fluad - Adjuvanted - PF (65 years and older 10/23/2024    Influenza - Trivalent - Flublok - PF (18 years and older) 02/19/2018    Influenza - Trivalent - Fluzone High Dose - PF (65 years and older) 04/21/2023    Pneumococcal Conjugate - 20 Valent 04/21/2023    Pneumococcal Polysaccharide - 23 Valent 10/04/2010    Tdap 07/12/2021    Zoster Recombinant 04/21/2023, 10/23/2023      COVID: UTD, noted above  Influenza: UTD, noted above  RSV: Due, not discussed today.  Pneumococcal: UTD, noted above  Tetantus: UTD, noted above  Zoster: UTD, noted above  Hep B: Due, not discussed today.  HPV: Due, not discussed today.    Screenings  Lung Ca: N/A  Colon Ca: UTD, last performed 2022.- repeat 8/2025  Breast Ca/Family Hx of BRCA related Ca: UTD, last performed 11/2024.  Cervical Ca: N/A Hysterectomy done  Osteoporosis: UTD, last performed 7/2024 - repeat in 5 years.  Prediabetes/Diabetes: Due, ordered.  Statins (Increased ASCVD): Due, ordered.  HIV: Due, ordered.  Hep C: Due, ordered.      Follow up in about 6 months (around 2/6/2026). In addition to their scheduled follow up, the patient has also been instructed to follow up on as needed basis.     Future Appointments   Date Time Provider Department Center   10/27/2025 10:30 AM Elda Simmons APRN [facility]   1/13/2026  8:00 AM [facility], [facility] ENDOCRINOLOGY [facility] MAYURR Cristian Fernandez  [facility] Internal Medicine          [1]    Patient Active Problem List  Diagnosis    Primary hypertension    Renal insufficiency    Persistent depressive disorder    Grief    COVID-19    Bilateral primary osteoarthritis of knee    BMI 35.0-35.9,adult    Psychophysiological insomnia      Cardiac

## 2025-08-21 ENCOUNTER — LAB VISIT (OUTPATIENT)
Dept: LAB | Facility: HOSPITAL | Age: 70
End: 2025-08-21
Payer: MEDICARE

## 2025-08-29 ENCOUNTER — ANESTHESIA EVENT (OUTPATIENT)
Dept: ENDOSCOPY | Facility: HOSPITAL | Age: 70
End: 2025-08-29
Payer: MEDICARE

## 2025-08-29 ENCOUNTER — ANESTHESIA (OUTPATIENT)
Dept: ENDOSCOPY | Facility: HOSPITAL | Age: 70
End: 2025-08-29
Payer: MEDICARE

## 2025-08-29 ENCOUNTER — HOSPITAL ENCOUNTER (OUTPATIENT)
Facility: HOSPITAL | Age: 70
Discharge: HOME | End: 2025-08-29
Attending: INTERNAL MEDICINE | Admitting: INTERNAL MEDICINE
Payer: MEDICARE

## 2025-08-29 DIAGNOSIS — R19.5 ABNORMAL FECES: ICD-10-CM

## 2025-08-29 PROCEDURE — 37000008 HC ANESTHESIA 1ST 15 MINUTES: Performed by: INTERNAL MEDICINE

## 2025-08-29 PROCEDURE — 63600175 PHARM REV CODE 636 W HCPCS: Performed by: ANESTHESIOLOGY

## 2025-08-29 PROCEDURE — G0105 COLORECTAL SCRN; HI RISK IND: HCPCS | Performed by: INTERNAL MEDICINE

## 2025-08-29 PROCEDURE — 37000009 HC ANESTHESIA EA ADD 15 MINS: Performed by: INTERNAL MEDICINE

## 2025-08-29 PROCEDURE — 63600175 PHARM REV CODE 636 W HCPCS: Performed by: NURSE ANESTHETIST, CERTIFIED REGISTERED

## 2025-08-29 RX ORDER — ONDANSETRON HYDROCHLORIDE 2 MG/ML
4 INJECTION, SOLUTION INTRAVENOUS ONCE AS NEEDED
OUTPATIENT
Start: 2025-08-29 | End: 2037-01-24

## 2025-08-29 RX ORDER — IPRATROPIUM BROMIDE AND ALBUTEROL SULFATE 2.5; .5 MG/3ML; MG/3ML
3 SOLUTION RESPIRATORY (INHALATION) ONCE AS NEEDED
OUTPATIENT
Start: 2025-08-29 | End: 2037-01-24

## 2025-08-29 RX ORDER — DIPHENHYDRAMINE HYDROCHLORIDE 50 MG/ML
12.5 INJECTION, SOLUTION INTRAMUSCULAR; INTRAVENOUS ONCE AS NEEDED
OUTPATIENT
Start: 2025-08-29 | End: 2037-01-24

## 2025-08-29 RX ORDER — GLUCAGON 1 MG
1 KIT INJECTION
OUTPATIENT
Start: 2025-08-29

## 2025-08-29 RX ORDER — SODIUM CHLORIDE, SODIUM LACTATE, POTASSIUM CHLORIDE, CALCIUM CHLORIDE 600; 310; 30; 20 MG/100ML; MG/100ML; MG/100ML; MG/100ML
125 INJECTION, SOLUTION INTRAVENOUS CONTINUOUS
OUTPATIENT
Start: 2025-08-29 | End: 2025-08-29

## 2025-08-29 RX ORDER — LIDOCAINE HYDROCHLORIDE 20 MG/ML
INJECTION INTRAVENOUS
Status: DISCONTINUED | OUTPATIENT
Start: 2025-08-29 | End: 2025-08-29

## 2025-08-29 RX ORDER — LIDOCAINE HYDROCHLORIDE 10 MG/ML
1 INJECTION, SOLUTION EPIDURAL; INFILTRATION; INTRACAUDAL; PERINEURAL ONCE
Status: DISCONTINUED | OUTPATIENT
Start: 2025-08-29 | End: 2025-08-29 | Stop reason: HOSPADM

## 2025-08-29 RX ORDER — PROPOFOL 10 MG/ML
VIAL (ML) INTRAVENOUS
Status: DISCONTINUED | OUTPATIENT
Start: 2025-08-29 | End: 2025-08-29

## 2025-08-29 RX ORDER — SODIUM CHLORIDE, SODIUM LACTATE, POTASSIUM CHLORIDE, CALCIUM CHLORIDE 600; 310; 30; 20 MG/100ML; MG/100ML; MG/100ML; MG/100ML
INJECTION, SOLUTION INTRAVENOUS CONTINUOUS
Status: DISCONTINUED | OUTPATIENT
Start: 2025-08-29 | End: 2025-08-29 | Stop reason: HOSPADM

## 2025-08-29 RX ORDER — HYDROMORPHONE HYDROCHLORIDE 1 MG/ML
0.4 INJECTION, SOLUTION INTRAMUSCULAR; INTRAVENOUS; SUBCUTANEOUS EVERY 10 MIN PRN
Refills: 0 | OUTPATIENT
Start: 2025-08-29

## 2025-08-29 RX ORDER — OXYCODONE AND ACETAMINOPHEN 5; 325 MG/1; MG/1
1 TABLET ORAL
Refills: 0 | OUTPATIENT
Start: 2025-08-29

## 2025-08-29 RX ADMIN — PROPOFOL 100 MCG/KG/MIN: 10 INJECTION, EMULSION INTRAVENOUS at 08:08

## 2025-08-29 RX ADMIN — SODIUM CHLORIDE, POTASSIUM CHLORIDE, SODIUM LACTATE AND CALCIUM CHLORIDE: 600; 310; 30; 20 INJECTION, SOLUTION INTRAVENOUS at 07:08

## 2025-08-29 RX ADMIN — PROPOFOL 100 MG: 10 INJECTION, EMULSION INTRAVENOUS at 08:08

## 2025-08-29 RX ADMIN — LIDOCAINE HYDROCHLORIDE 50 MG: 20 INJECTION INTRAVENOUS at 08:08

## 2025-09-02 ENCOUNTER — TELEPHONE (OUTPATIENT)
Dept: INTERNAL MEDICINE | Facility: CLINIC | Age: 70
End: 2025-09-02
Payer: MEDICARE

## 2025-09-02 VITALS
HEIGHT: 70 IN | HEART RATE: 70 BPM | RESPIRATION RATE: 18 BRPM | OXYGEN SATURATION: 99 % | BODY MASS INDEX: 36.94 KG/M2 | SYSTOLIC BLOOD PRESSURE: 138 MMHG | WEIGHT: 258 LBS | DIASTOLIC BLOOD PRESSURE: 86 MMHG | TEMPERATURE: 98 F

## (undated) DEVICE — DEFOAMER WATER SOLUBLE ENDO

## (undated) DEVICE — MANIFOLD 4 PORT

## (undated) DEVICE — KIT SURGICAL COLON .25 1.1OZ